# Patient Record
Sex: FEMALE | Race: WHITE | NOT HISPANIC OR LATINO | Employment: OTHER | URBAN - METROPOLITAN AREA
[De-identification: names, ages, dates, MRNs, and addresses within clinical notes are randomized per-mention and may not be internally consistent; named-entity substitution may affect disease eponyms.]

---

## 2017-07-10 ENCOUNTER — OFFICE VISIT (OUTPATIENT)
Dept: MEDICAL GROUP | Facility: PHYSICIAN GROUP | Age: 77
End: 2017-07-10
Payer: MEDICARE

## 2017-07-10 VITALS
HEIGHT: 64 IN | HEART RATE: 91 BPM | BODY MASS INDEX: 34.31 KG/M2 | WEIGHT: 201 LBS | OXYGEN SATURATION: 95 % | DIASTOLIC BLOOD PRESSURE: 90 MMHG | SYSTOLIC BLOOD PRESSURE: 230 MMHG | TEMPERATURE: 98.1 F

## 2017-07-10 DIAGNOSIS — I10 UNCONTROLLED HYPERTENSION: ICD-10-CM

## 2017-07-10 PROCEDURE — 99214 OFFICE O/P EST MOD 30 MIN: CPT | Performed by: FAMILY MEDICINE

## 2017-07-10 RX ORDER — AMLODIPINE BESYLATE 10 MG/1
10 TABLET ORAL DAILY
Qty: 30 TAB | Refills: 3 | Status: SHIPPED | OUTPATIENT
Start: 2017-07-10 | End: 2017-07-18

## 2017-07-10 ASSESSMENT — PATIENT HEALTH QUESTIONNAIRE - PHQ9: CLINICAL INTERPRETATION OF PHQ2 SCORE: 2

## 2017-07-10 ASSESSMENT — PAIN SCALES - GENERAL: PAINLEVEL: NO PAIN

## 2017-07-10 NOTE — MR AVS SNAPSHOT
"        Katharine Gee   7/10/2017 3:20 PM   Office Visit   MRN: [id number]    Department:  [facility]   Dept Phone:  [phone]    Description:  Female : 1940   Provider:  Jignesh Boyce M.D.           Reason for Visit     Hypertension           Allergies as of 7/10/2017     No Known Allergies      You were diagnosed with     Uncontrolled hypertension   [260723]         Vital Signs     Blood Pressure Pulse Temperature Height Weight Body Mass Index    230/90 mmHg 91 36.7 °C (98.1 °F) 1.626 m (5' 4.02\") 91.173 kg (201 lb) 34.48 kg/m2    Oxygen Saturation Smoking Status                95% Never Smoker           Basic Information     Date Of Birth Sex Race Ethnicity Preferred Language    1940 Female White Non- English      Your appointments     2017  9:40 AM   Access New To You with Phil Haynes M.D.   [facility]    [address] [place] [address] [id number] [phone]              Problem List              ICD-10-CM Priority Class Noted - Resolved    HTN (hypertension) I10   Unknown - Present    Polymyalgia (CMS-HCC) M35.3   10/6/2014 - Present    Elevated sed rate R70.0   2014 - Present    Elevated C-reactive protein (CRP) R79.82   2014 - Present    Menopause Z78.0   2015 - Present      Health Maintenance        Date Due Completion Dates    IMM DTaP/Tdap/Td Vaccine (1 - Tdap) 1957 ---    PAP SMEAR 1959 ---    IMM ZOSTER VACCINE 1998 ---    IMM PNEUMOCOCCAL 65+ (ADULT) LOW/MEDIUM RISK SERIES (2 of 2 - PCV13) 2015    MAMMOGRAM 2015, 2006    COLONOSCOPY 2016 (Done)    Override on 2006: Done    IMM INFLUENZA (1) 2017 10/5/2014    BONE DENSITY 2019            Current Immunizations     Influenza Vaccine Adult HD 10/5/2014 10:28 PM    Pneumococcal polysaccharide vaccine (PPSV-23) 2014  6:23 PM      Below and/or attached are the medications your " provider expects you to take. Review all of your home medications and newly ordered medications with your provider and/or pharmacist. Follow medication instructions as directed by your provider and/or pharmacist. Please keep your medication list with you and share with your provider. Update the information when medications are discontinued, doses are changed, or new medications (including over-the-counter products) are added; and carry medication information at all times in the event of emergency situations     Allergies:  No Known Allergies          Medications  Valid as of: July 10, 2017 -  3:50 PM    Generic Name Brand Name Tablet Size Instructions for use    AmLODIPine Besylate (Tab) NORVASC 10 MG Take 10 mg by mouth every day.        AmLODIPine Besylate (Tab) NORVASC 10 MG TAKE 1 TABLET BY MOUTH EVERY DAY        AmLODIPine Besylate (Tab) NORVASC 10 MG Take 1 Tab by mouth every day.        Benazepril HCl (Tab) LOTENSIN 20 MG Take 1 Tab by mouth every day.        Docusate Calcium   Take  by mouth.        Ergocalciferol (Cap) DRISDOL 16628 UNITS Take 1 Cap by mouth every 7 days.        PredniSONE (Tab) DELTASONE 5 MG TAKE 1 TABLET BY MOUTH EVERY DAY WITH 2.5 MG        PredniSONE (Tab) DELTASONE 5 MG TAKE 1 TABLET BY MOUTH EVERY DAY WITH 2.5 MG        .                 Medicines prescribed today were sent to:     [facility] #[id number] - [place], [place] - [address] [place] [id number]    Phone: [phone] Fax: [phone]    Open 24 Hours?: No      Medication refill instructions:       If your prescription bottle indicates you have medication refills left, it is not necessary to call your provider’s office. Please contact your pharmacy and they will refill your medication.    If your prescription bottle indicates you do not have any refills left, you may request refills at any time through one of the following ways: The online [employer] system (except Urgent Care), by  calling your provider’s office, or by asking your pharmacy to contact your provider’s office with a refill request. Medication refills are processed only during regular business hours and may not be available until the next business day. Your provider may request additional information or to have a follow-up visit with you prior to refilling your medication.   *Please Note: Medication refills are assigned a new Rx number when refilled electronically. Your pharmacy may indicate that no refills were authorized even though a new prescription for the same medication is available at the pharmacy. Please request the medicine by name with the pharmacy before contacting your provider for a refill.        Your To Do List     Future Labs/Procedures Complete By Expires    CBC WITH DIFFERENTIAL  As directed 7/11/2018    COMP METABOLIC PANEL  As directed 7/11/2018    LIPID PROFILE  As directed 7/11/2018    MICROALBUMIN CREAT RATIO URINE  As directed 7/11/2018    TSH  As directed 7/11/2018    VITAMIN D,25 HYDROXY  As directed 7/11/2018      Other Notes About Your Plan     Katharine is a Medical Home patient of Dr. Sissy Pineda.           [employer] Access Code: Activation code not generated  Current [employer] Status: Active

## 2017-07-10 NOTE — PROGRESS NOTES
Subjective:   Katharine Gee is a 76 y.o. female here today for uncontrolled hypertension    History of present illness:     Patient's previous PCP was Dr. Pineda. Patient has an appointment to establish care with Dr. Haynes.  She is here today because recently a few days back her daughter checked her blood pressure at home and it was very high , systolic being above 200. Patient is currently grieving from the loss of her  about a month back who is on chronic dialysis. Patient admits that she was very busy taking care of her  the last 2-3 years and has not seen a doctor since 2015 and has also not been taking any medications. In the past she has a diagnosis of hypertension and was taking amlodipine and benazepril. Based on last office notes of Dr. Pineda, blood pressure was stable on last office visit in 2015. But she notes that she has not taking any medication for over 2 years. She also has not had any recent blood work. She states that she is currently coping with the loss of her  and her daughters have been very supportive. She does feel very anxious from time to time.  She has occasionally been getting dizzy and headaches which are generally episodic. She does not have a headache currently. She denies chest pain, shortness of breath, weakness, numbness, tingling, dysphagia, dysarthria, speech changes, , visual disturbances.    Current medicines (including changes today)  Current Outpatient Prescriptions   Medication Sig Dispense Refill   • amlodipine (NORVASC) 10 MG Tab Take 1 Tab by mouth every day. 30 Tab 3   • amlodipine (NORVASC) 10 MG Tab TAKE 1 TABLET BY MOUTH EVERY DAY 30 Tab 1   • predniSONE (DELTASONE) 5 MG TABS TAKE 1 TABLET BY MOUTH EVERY DAY WITH 2.5 MG 30 Tab 1   • predniSONE (DELTASONE) 5 MG TABS TAKE 1 TABLET BY MOUTH EVERY DAY WITH 2.5 MG 30 Tab 1   • benazepril (LOTENSIN) 20 MG TABS Take 1 Tab by mouth every day. 30 Tab 5   • vitamin D, Ergocalciferol, (DRISDOL) 65256  "UNITS CAPS capsule Take 1 Cap by mouth every 7 days. 12 Cap 1   • amlodipine (NORVASC) 10 MG TABS Take 10 mg by mouth every day.     • Docusate Calcium (STOOL SOFTENER PO) Take  by mouth.       No current facility-administered medications for this visit.     She  has a past medical history of HTN (hypertension); Arthritis; MEDICAL HOME; Polymyalgia (10/6/2014); and HTN (hypertension). She also has no past medical history of Breast cancer.    ROS   See history of present illness  No chest pain, no shortness of breath, no abdominal pain       Objective:     Blood pressure 230/90, pulse 91, temperature 36.7 °C (98.1 °F), height 1.626 m (5' 4.02\"), weight 91.173 kg (201 lb), SpO2 95 %. Body mass index is 34.48 kg/(m^2).     PHYSICAL EXAM     GEN: Alert and oriented,well appearing, no acute distress  SKIN: warm, dry to touch, no rashes or lesions in visible areas  PSYCH: mood and affect normal, judgement normal  EYES: Conjunctiva clear, lids normal,pupils equal round and reactive  ENMT: Normal external nose and ears,EACs normal appearing, TM pearly gray with normal light reflex bilaterally,nasal mucosa and turbinates normal appearing without erythema or edema, lips without lesions,good dentition,oropharynx clear  Neck : Trachea midline, no masses or swelling, no thyromegaly  LYMPHATIC : No cervical or supraclavicular lymphadenopathy  RESPIRATORY : Unlabored respiratory effort, no distress noted, clear to auscultation bilaterally, no wheeze, rhonchi, crackles  CARDIOVASCULAR: RRR, S1 S2 normal, no murmurs , gallop , no carotid bruit, no edema of the extremities, pedal pulses B/L 2+  GI: Soft, Non tender, No rebound or guarding, no hepatosplenomegaly, no masses  MUSCULOSKELETAL : Normal gait and stance, no obvious abnormalities   NEURO: No overt focal neurologic deficits,sensation intact      Assessment and Plan:   The following treatment plan was discussed    1. Severe Hypertension  New problem.rechecked BP and it was " 212/82.Patient currently asymptomatic.  Will start patient today on amlodipine 10 mg daily.Patient aware of the risks of high blood pressure including stroke, ACS and advised to immediately call 911 in case of any such signs and symptoms.labs ordered to evaluate for end organ damage (including routine labs)  - CBC WITH DIFFERENTIAL; Future  - COMP METABOLIC PANEL; Future  - LIPID PROFILE; Future  - MICROALBUMIN CREAT RATIO URINE; Future  - TSH; Future  - VITAMIN D,25 HYDROXY; Future  - amlodipine (NORVASC) 10 MG Tab; Take 1 Tab by mouth every day.  Dispense: 30 Tab; Refill: 3      Followup: Return in about 1 week (around 7/17/2017) for establish with PCP, labs, BP.         Please note that this dictation was created using voice recognition software. I have made every reasonable attempt to correct obvious errors, but I expect that there are errors of grammar and possibly content that I did not discover before finalizing the note.

## 2017-07-11 ENCOUNTER — HOSPITAL ENCOUNTER (OUTPATIENT)
Dept: LAB | Facility: MEDICAL CENTER | Age: 77
End: 2017-07-11
Attending: FAMILY MEDICINE
Payer: MEDICARE

## 2017-07-11 DIAGNOSIS — I10 UNCONTROLLED HYPERTENSION: ICD-10-CM

## 2017-07-11 LAB
25(OH)D3 SERPL-MCNC: 27 NG/ML (ref 30–100)
ALBUMIN SERPL BCP-MCNC: 3.7 G/DL (ref 3.2–4.9)
ALBUMIN/GLOB SERPL: 1.2 G/DL
ALP SERPL-CCNC: 77 U/L (ref 30–99)
ALT SERPL-CCNC: 11 U/L (ref 2–50)
ANION GAP SERPL CALC-SCNC: 6 MMOL/L (ref 0–11.9)
AST SERPL-CCNC: 14 U/L (ref 12–45)
BASOPHILS # BLD AUTO: 0.5 % (ref 0–1.8)
BASOPHILS # BLD: 0.04 K/UL (ref 0–0.12)
BILIRUB SERPL-MCNC: 0.6 MG/DL (ref 0.1–1.5)
BUN SERPL-MCNC: 14 MG/DL (ref 8–22)
CALCIUM SERPL-MCNC: 9.3 MG/DL (ref 8.5–10.5)
CHLORIDE SERPL-SCNC: 106 MMOL/L (ref 96–112)
CHOLEST SERPL-MCNC: 188 MG/DL (ref 100–199)
CO2 SERPL-SCNC: 26 MMOL/L (ref 20–33)
CREAT SERPL-MCNC: 0.72 MG/DL (ref 0.5–1.4)
CREAT UR-MCNC: 104.1 MG/DL
EOSINOPHIL # BLD AUTO: 0.12 K/UL (ref 0–0.51)
EOSINOPHIL NFR BLD: 1.6 % (ref 0–6.9)
ERYTHROCYTE [DISTWIDTH] IN BLOOD BY AUTOMATED COUNT: 46.4 FL (ref 35.9–50)
GFR SERPL CREATININE-BSD FRML MDRD: >60 ML/MIN/1.73 M 2
GLOBULIN SER CALC-MCNC: 3.1 G/DL (ref 1.9–3.5)
GLUCOSE SERPL-MCNC: 78 MG/DL (ref 65–99)
HCT VFR BLD AUTO: 42.6 % (ref 37–47)
HDLC SERPL-MCNC: 55 MG/DL
HGB BLD-MCNC: 13.7 G/DL (ref 12–16)
IMM GRANULOCYTES # BLD AUTO: 0.02 K/UL (ref 0–0.11)
IMM GRANULOCYTES NFR BLD AUTO: 0.3 % (ref 0–0.9)
LDLC SERPL CALC-MCNC: 107 MG/DL
LYMPHOCYTES # BLD AUTO: 2.26 K/UL (ref 1–4.8)
LYMPHOCYTES NFR BLD: 30.1 % (ref 22–41)
MCH RBC QN AUTO: 28.4 PG (ref 27–33)
MCHC RBC AUTO-ENTMCNC: 32.2 G/DL (ref 33.6–35)
MCV RBC AUTO: 88.4 FL (ref 81.4–97.8)
MICROALBUMIN UR-MCNC: 1.4 MG/DL
MICROALBUMIN/CREAT UR: 13 MG/G (ref 0–30)
MONOCYTES # BLD AUTO: 0.51 K/UL (ref 0–0.85)
MONOCYTES NFR BLD AUTO: 6.8 % (ref 0–13.4)
NEUTROPHILS # BLD AUTO: 4.56 K/UL (ref 2–7.15)
NEUTROPHILS NFR BLD: 60.7 % (ref 44–72)
NRBC # BLD AUTO: 0 K/UL
NRBC BLD AUTO-RTO: 0 /100 WBC
PLATELET # BLD AUTO: 250 K/UL (ref 164–446)
PMV BLD AUTO: 10 FL (ref 9–12.9)
POTASSIUM SERPL-SCNC: 3.4 MMOL/L (ref 3.6–5.5)
PROT SERPL-MCNC: 6.8 G/DL (ref 6–8.2)
RBC # BLD AUTO: 4.82 M/UL (ref 4.2–5.4)
SODIUM SERPL-SCNC: 138 MMOL/L (ref 135–145)
TRIGL SERPL-MCNC: 128 MG/DL (ref 0–149)
TSH SERPL DL<=0.005 MIU/L-ACNC: 5.09 UIU/ML (ref 0.3–3.7)
WBC # BLD AUTO: 7.5 K/UL (ref 4.8–10.8)

## 2017-07-11 PROCEDURE — 82570 ASSAY OF URINE CREATININE: CPT

## 2017-07-11 PROCEDURE — 36415 COLL VENOUS BLD VENIPUNCTURE: CPT

## 2017-07-11 PROCEDURE — 80061 LIPID PANEL: CPT

## 2017-07-11 PROCEDURE — 82043 UR ALBUMIN QUANTITATIVE: CPT

## 2017-07-11 PROCEDURE — 80053 COMPREHEN METABOLIC PANEL: CPT

## 2017-07-11 PROCEDURE — 85025 COMPLETE CBC W/AUTO DIFF WBC: CPT

## 2017-07-11 PROCEDURE — 82306 VITAMIN D 25 HYDROXY: CPT

## 2017-07-11 PROCEDURE — 84443 ASSAY THYROID STIM HORMONE: CPT

## 2017-07-18 ENCOUNTER — OFFICE VISIT (OUTPATIENT)
Dept: MEDICAL GROUP | Facility: PHYSICIAN GROUP | Age: 77
End: 2017-07-18
Payer: MEDICARE

## 2017-07-18 ENCOUNTER — HOSPITAL ENCOUNTER (OUTPATIENT)
Dept: LAB | Facility: MEDICAL CENTER | Age: 77
End: 2017-07-18
Attending: INTERNAL MEDICINE
Payer: MEDICARE

## 2017-07-18 VITALS
BODY MASS INDEX: 33.97 KG/M2 | SYSTOLIC BLOOD PRESSURE: 162 MMHG | TEMPERATURE: 97 F | RESPIRATION RATE: 14 BRPM | OXYGEN SATURATION: 98 % | DIASTOLIC BLOOD PRESSURE: 68 MMHG | HEART RATE: 80 BPM | HEIGHT: 64 IN | WEIGHT: 199 LBS

## 2017-07-18 DIAGNOSIS — M35.3 POLYMYALGIA (HCC): ICD-10-CM

## 2017-07-18 DIAGNOSIS — E55.9 VITAMIN D DEFICIENCY: ICD-10-CM

## 2017-07-18 DIAGNOSIS — R79.89 ABNORMAL TSH: ICD-10-CM

## 2017-07-18 DIAGNOSIS — E87.6 HYPOKALEMIA: ICD-10-CM

## 2017-07-18 DIAGNOSIS — I10 ESSENTIAL HYPERTENSION: ICD-10-CM

## 2017-07-18 DIAGNOSIS — Z23 NEED FOR VACCINATION: ICD-10-CM

## 2017-07-18 PROCEDURE — G0009 ADMIN PNEUMOCOCCAL VACCINE: HCPCS | Performed by: INTERNAL MEDICINE

## 2017-07-18 PROCEDURE — 99204 OFFICE O/P NEW MOD 45 MIN: CPT | Mod: 25 | Performed by: INTERNAL MEDICINE

## 2017-07-18 PROCEDURE — 90670 PCV13 VACCINE IM: CPT | Performed by: INTERNAL MEDICINE

## 2017-07-18 RX ORDER — LISINOPRIL 10 MG/1
10 TABLET ORAL DAILY
Qty: 90 TAB | Refills: 3 | Status: SHIPPED | OUTPATIENT
Start: 2017-07-18 | End: 2017-10-04

## 2017-07-18 RX ORDER — MULTIVIT-MIN/IRON/FOLIC ACID/K 18-600-40
2000 CAPSULE ORAL DAILY
Qty: 30 CAP | COMMUNITY
Start: 2017-07-18 | End: 2019-11-01

## 2017-07-18 RX ORDER — AMLODIPINE BESYLATE 10 MG/1
10 TABLET ORAL
Qty: 90 TAB | Refills: 1 | Status: SHIPPED | OUTPATIENT
Start: 2017-07-18 | End: 2017-10-04

## 2017-07-18 NOTE — MR AVS SNAPSHOT
"        Katharine Gee   2017 9:40 AM   Office Visit   MRN: [id number]    Department:  [facility]   Dept Phone:  [phone]    Description:  Female : 1940   Provider:  Phil Haynes M.D.           Reason for Visit     Establish Care           Allergies as of 2017     No Known Allergies      You were diagnosed with     Essential hypertension   [5998087]       Polymyalgia (CMS-HCC)   [723763]       Abnormal TSH   [249977]       Hypokalemia   [158731]       Vitamin D deficiency   [2871554]       Need for vaccination   [730761]         Vital Signs     Blood Pressure Pulse Temperature Respirations Height Weight    162/68 mmHg 80 36.1 °C (97 °F) 14 1.626 m (5' 4\") 90.266 kg (199 lb)    Body Mass Index Oxygen Saturation Smoking Status             34.14 kg/m2 98% Never Smoker          Basic Information     Date Of Birth Sex Race Ethnicity Preferred Language    1940 Female White Non- English      Your appointments     Sep 05, 2017  9:20 AM   Established Patient with Phil Haynes M.D.   [facility]    [address] [place] [address] [id number] [phone]           You will be receiving a confirmation call a few days before your appointment from our automated call confirmation system.              Problem List              ICD-10-CM Priority Class Noted - Resolved    HTN (hypertension) I10   Unknown - Present    Polymyalgia (CMS-HCC) M35.3   10/6/2014 - Present    Menopause Z78.0   2015 - Present    Abnormal TSH R79.89   2017 - Present    Hypokalemia E87.6   2017 - Present    Vitamin D deficiency E55.9   2017 - Present      Health Maintenance        Date Due Completion Dates    IMM DTaP/Tdap/Td Vaccine (1 - Tdap) 1957 ---    IMM ZOSTER VACCINE 1998 ---    IMM PNEUMOCOCCAL 65+ (ADULT) LOW/MEDIUM RISK SERIES (2 of 2 - PCV13) 2015    IMM INFLUENZA (1) 2017 10/5/2014    BONE DENSITY 2019            "   Current Immunizations     13-VALENT PCV PREVNAR  Incomplete    Influenza Vaccine Adult HD 10/5/2014 10:28 PM    Pneumococcal polysaccharide vaccine (PPSV-23) 7/4/2014  6:23 PM    Tdap Vaccine  Incomplete      Below and/or attached are the medications your provider expects you to take. Review all of your home medications and newly ordered medications with your provider and/or pharmacist. Follow medication instructions as directed by your provider and/or pharmacist. Please keep your medication list with you and share with your provider. Update the information when medications are discontinued, doses are changed, or new medications (including over-the-counter products) are added; and carry medication information at all times in the event of emergency situations     Allergies:  No Known Allergies          Medications  Valid as of: July 18, 2017 - 10:17 AM    Generic Name Brand Name Tablet Size Instructions for use    AmLODIPine Besylate (Tab) NORVASC 10 MG Take 1 Tab by mouth every day.        Cholecalciferol (Cap) Vitamin D 2000 UNITS Take 1 Cap by mouth every day.        Docusate Calcium   Take  by mouth.        Lisinopril (Tab) PRINIVIL 10 MG Take 1 Tab by mouth every day.        .                 Medicines prescribed today were sent to:     [facility]Y #[id number] - [place], [place] - [address] AT [address] [address]    Phone: [phone] Fax: [phone]    Open 24 Hours?: No      Medication refill instructions:       If your prescription bottle indicates you have medication refills left, it is not necessary to call your provider’s office. Please contact your pharmacy and they will refill your medication.    If your prescription bottle indicates you do not have any refills left, you may request refills at any time through one of the following ways: The online [employer] system (except Urgent Care), by calling your provider’s office, or by asking your pharmacy to contact  your provider’s office with a refill request. Medication refills are processed only during regular business hours and may not be available until the next business day. Your provider may request additional information or to have a follow-up visit with you prior to refilling your medication.   *Please Note: Medication refills are assigned a new Rx number when refilled electronically. Your pharmacy may indicate that no refills were authorized even though a new prescription for the same medication is available at the pharmacy. Please request the medicine by name with the pharmacy before contacting your provider for a refill.        Your To Do List     Future Labs/Procedures Complete By Expires    BASIC METABOLIC PANEL  As directed 7/18/2018    TSH WITH REFLEX TO FT4  As directed 7/18/2018    VITAMIN D,25 HYDROXY  As directed 7/18/2018      Other Notes About Your Plan     Katharine is a Medical Home patient of Dr. Sissy Pineda.           [employer] Access Code: Activation code not generated  Current [employer] Status: Active

## 2017-07-18 NOTE — PROGRESS NOTES
"Chief Complaint   Patient presents with   • Establish Care         HISTORY OF THE PRESENT ILLNESS: Patient is a 76 y.o. female. This pleasant patient is here today for HTN, abnormal labs, polymyalgia rheumatica    HTN (hypertension)  Pt in on amlodipine 10 mg daily for HTN. This was started last week when she was found to have a BP of over 200 systolic. Pt reports compliance with medication. Her BP improved to 162/68 today.     Denies chest pain, shortness of breath, headache, blurry vision.       Polymyalgia  Pt was previously seeing rheumatology for this. Over the past 3 years she has been taking care of her  and not able to care for herself as well and was not on medication. Prior to this she was on prednisone. She denies any joint pains currently. She denies fevers/chills, night sweats, weakness of her joints. She does report she gets tired with walking at times.     Abnormal TSH  TSH of 5.090 seen on labs. No history of thyroid disease.     Hypokalemia  K of 3.4 seen on labs.     Vitamin D deficiency  Vitamin d of 27 seen on labs. Pt is not on supplementation.       Allergies: Review of patient's allergies indicates no known allergies.    Current Outpatient Prescriptions Ordered in [facility]   Medication Sig Dispense Refill   • Cholecalciferol (VITAMIN D) 2000 UNITS Cap Take 1 Cap by mouth every day. 30 Cap    • lisinopril (PRINIVIL) 10 MG Tab Take 1 Tab by mouth every day. 90 Tab 3   • amlodipine (NORVASC) 10 MG Tab Take 1 Tab by mouth every day. 90 Tab 1   • Docusate Calcium (STOOL SOFTENER PO) Take  by mouth.       No current Epic-ordered facility-administered medications on file.       Past Medical History   Diagnosis Date   • HTN (hypertension)    • Arthritis    • MEDICAL HOME    • Polymyalgia (CMS-[facility]) 10/6/2014   • HTN (hypertension)        Past Surgical History   Procedure Laterality Date   • Pr revise secondary varicosity       \"varicose veins operated on \"    • Exploratory laparotomy  7/3/2014     " "Performed by Danita Tolbert M.D. at [facility]   • Bowel resection  7/3/2014     Performed by Danita Tolbert M.D. at [facility] [place] [facility]   • Tonsillectomy     • Vaginal hysterectomy total     • Other         Social History   Substance Use Topics   • Smoking status: Never Smoker    • Smokeless tobacco: Never Used   • Alcohol Use: Yes      Comment: occasional       Family History   Problem Relation Age of Onset   • Cancer Father      prostaste   • Cancer Brother    • Dementia Brother        Review of Systems :    Denies chest pain, SOB  All other systems reviewed and are negative except as in HPI.      Exam: Blood pressure 162/68, pulse 80, temperature 36.1 °C (97 °F), resp. rate 14, height 1.626 m (5' 4\"), weight 90.266 kg (199 lb), SpO2 98 %.    Blood pressure 162/68, pulse 80, temperature 36.1 °C (97 °F), resp. rate 14, height 1.626 m (5' 4\"), weight 90.266 kg (199 lb), SpO2 98 %. Body mass index is 34.14 kg/(m^2).   Physical Exam:  Constitutional: Alert & oriented, no acute distress  Eye: Equal, round and reactive, conjunctiva clear, lids normal  ENMT: Lips without lesions, good dentition, oropharynx clear  Neck: Trachea midline, no masses, no thyromegaly. No cervical or supraclavicular lymphadenopathy  Respiratory: Unlabored respiratory effort, lungs clear to auscultation, no wheezes, no ronchi  Cardiovascular: Normal S1, S2, no murmur, no edema  Abdomen: Obese  Skin: Warm, dry, good turgor, no rashes in visible areas  Neuro: No overt focal neurologic deficits, normal gait  Psych: Normal mood and affect, judgement normal  Musculoskeletal: Normal gait. No extremity cyanosis, clubbing, or edema.    Assessment/Plan  1. Essential hypertension  Blood pressure significantly improved from last week on amlodipine but still suboptimal today. We'll add lisinopril to patient's regimen of amlodipine. She is going to get repeat blood work done in 6 weeks for abnormal lab findings. The patient's follow up in " clinic one week after these repeat labs and assess response to blood pressure medication change at that time  - lisinopril (PRINIVIL) 10 MG Tab; Take 1 Tab by mouth every day.  Dispense: 90 Tab; Refill: 3  - amlodipine (NORVASC) 10 MG Tab; Take 1 Tab by mouth every day.  Dispense: 90 Tab; Refill: 1    2. Polymyalgia (CMS-HCC)  Patient is a longer medications or seeing rheumatology. She has no acute symptoms. We can therefore monitor this clinically and consider restarting treatment/referral if symptoms recur    3. Abnormal TSH  Seen on labs. We'll recheck lab in 6 weeks in follow-up shortly after  - TSH WITH REFLEX TO FT4; Future    4. Hypokalemia  Mildly low potassium on labs. Recheck lab in 6 weeks. Patient is also starting lisinopril which may affect this lab result which makes the repeat lab check even more necessary  - BASIC METABOLIC PANEL; Future    5. Vitamin D deficiency  Pt will start OTC vitamin D supplementation at 2000 units per day. We'll recheck lab in 6 weeks and follow-up shortly after  - VITAMIN D,25 HYDROXY; Future  - Cholecalciferol (VITAMIN D) 2000 UNITS Cap; Take 1 Cap by mouth every day.  Dispense: 30 Cap    6. Need for vaccination  - Tdap =>8yo IM  - Prevnar 13 PCV-13    Return in about 8 weeks (around 9/12/2017).    Please note that this dictation was created using voice recognition software. I have made every reasonable attempt to correct obvious errors, but I expect that there are errors of grammar and possibly content that I did not discover before finalizing the note.

## 2017-07-18 NOTE — ASSESSMENT & PLAN NOTE
Pt in on amlodipine 10 mg daily for HTN. This was started last week when she was found to have a BP of over 200 systolic. Pt reports compliance with medication. Her BP improved to 162/68 today.     Denies chest pain, shortness of breath, headache, blurry vision.

## 2017-07-18 NOTE — ASSESSMENT & PLAN NOTE
Pt was previously seeing rheumatology for this. Over the past 3 years she has been taking care of her  and not able to care for herself as well and was not on medication. Prior to this she was on prednisone. She denies any joint pains currently. She denies fevers/chills, night sweats, weakness of her joints. She does report she gets tired with walking at times.

## 2017-09-14 ENCOUNTER — HOSPITAL ENCOUNTER (OUTPATIENT)
Dept: LAB | Facility: MEDICAL CENTER | Age: 77
End: 2017-09-14
Attending: INTERNAL MEDICINE
Payer: MEDICARE

## 2017-09-14 DIAGNOSIS — E87.6 HYPOKALEMIA: ICD-10-CM

## 2017-09-14 DIAGNOSIS — E55.9 VITAMIN D DEFICIENCY: ICD-10-CM

## 2017-09-14 DIAGNOSIS — R79.89 ABNORMAL TSH: ICD-10-CM

## 2017-09-14 LAB
25(OH)D3 SERPL-MCNC: 25 NG/ML (ref 30–100)
ANION GAP SERPL CALC-SCNC: 8 MMOL/L (ref 0–11.9)
BUN SERPL-MCNC: 23 MG/DL (ref 8–22)
CALCIUM SERPL-MCNC: 10.1 MG/DL (ref 8.5–10.5)
CHLORIDE SERPL-SCNC: 110 MMOL/L (ref 96–112)
CO2 SERPL-SCNC: 24 MMOL/L (ref 20–33)
CREAT SERPL-MCNC: 0.91 MG/DL (ref 0.5–1.4)
GFR SERPL CREATININE-BSD FRML MDRD: 60 ML/MIN/1.73 M 2
GLUCOSE SERPL-MCNC: 98 MG/DL (ref 65–99)
POTASSIUM SERPL-SCNC: 4.1 MMOL/L (ref 3.6–5.5)
SODIUM SERPL-SCNC: 142 MMOL/L (ref 135–145)
T4 FREE SERPL-MCNC: 0.73 NG/DL (ref 0.53–1.43)
TSH SERPL DL<=0.005 MIU/L-ACNC: 3.93 UIU/ML (ref 0.3–3.7)

## 2017-09-14 PROCEDURE — 82306 VITAMIN D 25 HYDROXY: CPT

## 2017-09-14 PROCEDURE — 80048 BASIC METABOLIC PNL TOTAL CA: CPT

## 2017-09-14 PROCEDURE — 84439 ASSAY OF FREE THYROXINE: CPT

## 2017-09-14 PROCEDURE — 84443 ASSAY THYROID STIM HORMONE: CPT

## 2017-09-14 PROCEDURE — 36415 COLL VENOUS BLD VENIPUNCTURE: CPT

## 2017-10-04 ENCOUNTER — OFFICE VISIT (OUTPATIENT)
Dept: MEDICAL GROUP | Facility: PHYSICIAN GROUP | Age: 77
End: 2017-10-04
Payer: MEDICARE

## 2017-10-04 VITALS
TEMPERATURE: 97 F | RESPIRATION RATE: 12 BRPM | WEIGHT: 198 LBS | BODY MASS INDEX: 33.8 KG/M2 | HEART RATE: 70 BPM | DIASTOLIC BLOOD PRESSURE: 64 MMHG | HEIGHT: 64 IN | OXYGEN SATURATION: 97 % | SYSTOLIC BLOOD PRESSURE: 128 MMHG

## 2017-10-04 DIAGNOSIS — R42 DIZZINESS: ICD-10-CM

## 2017-10-04 DIAGNOSIS — R41.3 MEMORY LOSS: ICD-10-CM

## 2017-10-04 DIAGNOSIS — E03.9 ACQUIRED HYPOTHYROIDISM: ICD-10-CM

## 2017-10-04 DIAGNOSIS — I10 ESSENTIAL HYPERTENSION: ICD-10-CM

## 2017-10-04 PROCEDURE — 99214 OFFICE O/P EST MOD 30 MIN: CPT | Performed by: INTERNAL MEDICINE

## 2017-10-04 RX ORDER — LISINOPRIL 20 MG/1
20 TABLET ORAL DAILY
Qty: 30 TAB | Refills: 3 | Status: SHIPPED | OUTPATIENT
Start: 2017-10-04 | End: 2018-01-31 | Stop reason: SDUPTHER

## 2017-10-04 RX ORDER — LEVOTHYROXINE SODIUM 0.03 MG/1
25 TABLET ORAL
Qty: 30 TAB | Refills: 2 | Status: SHIPPED | OUTPATIENT
Start: 2017-10-04 | End: 2017-12-30 | Stop reason: SDUPTHER

## 2017-10-04 RX ORDER — AMLODIPINE BESYLATE 5 MG/1
5 TABLET ORAL DAILY
Qty: 30 TAB | Refills: 2 | Status: SHIPPED | OUTPATIENT
Start: 2017-10-04 | End: 2017-12-30 | Stop reason: SDUPTHER

## 2017-10-04 NOTE — ASSESSMENT & PLAN NOTE
Pt reports she has difficulty with memory. She will often repeat the same thing to her daughter multiple times or think she hasn't done something that she has already done. She does have a lot of grief recently with the loss of her  and son in the last 3 months.

## 2017-10-04 NOTE — ASSESSMENT & PLAN NOTE
Pt has been found to have low thyroid function on labs. She is not on medication. She reports symptoms of low energy, cold intolerance.     She reports difficulty with memory loss as well.

## 2017-10-04 NOTE — ASSESSMENT & PLAN NOTE
Pt in on lisinorpil 10 mg daily and amlodipine 10 mg daily for HTN. Pt reports compliance with medication. BP is at goal per JNC 8 critieria today.     Denies chest pain, shortness of breath, headache. She reports edema in her ankles. She does report a sensation of dizziness and instability at times. She describes it as a sensation of vertigo. This primarily occurs when she is standing up from a sitting position.     Denies dysphagia, numbness/weakness of extremities, vision or speech abnormalities.

## 2017-10-04 NOTE — PROGRESS NOTES
Subjective:   Katharine Gee is a 76 y.o. female here today for hypothyroidism, memory loss, HTN    Acquired hypothyroidism  Pt has been found to have low thyroid function on labs. She is not on medication. She reports symptoms of low energy, cold intolerance.     She reports difficulty with memory loss as well.     Memory loss  Pt reports she has difficulty with memory. She will often repeat the same thing to her daughter multiple times or think she hasn't done something that she has already done. She does have a lot of grief recently with the loss of her  and son in the last 3 months.     HTN (hypertension)  Pt in on lisinorpil 10 mg daily and amlodipine 10 mg daily for HTN. Pt reports compliance with medication. BP is at goal per JNC 8 critieria today.     Denies chest pain, shortness of breath, headache. She reports edema in her ankles. She does report a sensation of dizziness and instability at times. She describes it as a sensation of vertigo. This primarily occurs when she is standing up from a sitting position.     Denies dysphagia, numbness/weakness of extremities, vision or speech abnormalities.        Current medicines (including changes today)  Current Outpatient Prescriptions   Medication Sig Dispense Refill   • aspirin 81 MG tablet Take 81 mg by mouth every day.     • levothyroxine (SYNTHROID) 25 MCG Tab Take 1 Tab by mouth Every morning on an empty stomach. 30 Tab 2   • amlodipine (NORVASC) 5 MG Tab Take 1 Tab by mouth every day. 30 Tab 2   • lisinopril (PRINIVIL) 20 MG Tab Take 1 Tab by mouth every day. 30 Tab 3   • Cholecalciferol (VITAMIN D) 2000 UNITS Cap Take 1 Cap by mouth every day. 30 Cap    • Docusate Calcium (STOOL SOFTENER PO) Take  by mouth.       No current facility-administered medications for this visit.      She  has a past medical history of Arthritis; HTN (hypertension); HTN (hypertension); MEDICAL HOME; and Polymyalgia (CMS-HCC) (10/6/2014). She also has no past  "medical history of Breast cancer (CMS-HCC).    ROS   No chest pain, no SOB     Objective:     Blood pressure 128/64, pulse 70, temperature 36.1 °C (97 °F), resp. rate 12, height 1.626 m (5' 4\"), weight 89.8 kg (198 lb), SpO2 97 %. Body mass index is 33.99 kg/m².   Physical Exam:  Constitutional: Alert & oriented, no acute distress  Eye: Conjunctiva clear, lids normal, no discharge  ENMT: Lips without lesions, normal external nose and ears  Neck: Trachea midline, no masses, no thyromegaly. No cervical or supraclavicular lymphadenopathy  Respiratory: Unlabored respiratory effort, lungs clear to auscultation, no wheezes, no ronchi  Cardiovascular: Normal S1, S2, no murmur,Positive bilateral lower extremity edema  Skin: Warm, dry, good turgor, no rashes in visible areas  Neuro: Cranial nerves II through XII intact, normal strength and sensation bilateral upper and lower extremities, normal gait  Psych: Normal mood and affect      Assessment and Plan:   The following treatment plan was discussed    1. Acquired hypothyroidism  Will start Levothyroxine 25 µg daily and follow-up to assess response. We'll repeat labs in 6 weeks. We'll see if patient has response with regards to weakness, fatigue, dizziness, and memory loss.  - levothyroxine (SYNTHROID) 25 MCG Tab; Take 1 Tab by mouth Every morning on an empty stomach.  Dispense: 30 Tab; Refill: 2  - TSH WITH REFLEX TO FT4; Future    2. Memory loss  Etiology possibly related to thyroid or grief given she recently lost her  and son in the last 3 months. Patient counseled that we can monitor this at this time clinically given she is able to perform all her ADLs. If memory problems persist will consider formal evaluation    3. Essential hypertension  Given lower extremity edema will decrease amlodipine to 5 mg daily and increase lisinopril to 20 mg daily to compensate. Will have patient follow up in one week to recheck blood pressure  - amlodipine (NORVASC) 5 MG Tab; Take " 1 Tab by mouth every day.  Dispense: 30 Tab; Refill: 2  - lisinopril (PRINIVIL) 20 MG Tab; Take 1 Tab by mouth every day.  Dispense: 30 Tab; Refill: 3    4. Dizziness  Orthostatic negative. Pt does not have red flag symptoms that require urgent imaging. Gait is normal today. If neurologic symptoms develop or if dizziness persists will consider imaging. However at this time given we are making medication changes as above we will follow up and see if there is improvement with these changes      Followup: Return in about 1 week (around 10/11/2017).    Please note that this dictation was created using voice recognition software. I have made every reasonable attempt to correct obvious errors, but I expect that there are errors of grammar and possibly content that I did not discover before finalizing the note.

## 2017-10-11 ENCOUNTER — OFFICE VISIT (OUTPATIENT)
Dept: MEDICAL GROUP | Facility: PHYSICIAN GROUP | Age: 77
End: 2017-10-11
Payer: MEDICARE

## 2017-10-11 VITALS
WEIGHT: 198 LBS | DIASTOLIC BLOOD PRESSURE: 62 MMHG | HEART RATE: 74 BPM | TEMPERATURE: 97.9 F | HEIGHT: 64 IN | OXYGEN SATURATION: 97 % | SYSTOLIC BLOOD PRESSURE: 128 MMHG | BODY MASS INDEX: 33.8 KG/M2 | RESPIRATION RATE: 12 BRPM

## 2017-10-11 DIAGNOSIS — Z23 NEED FOR VACCINATION: ICD-10-CM

## 2017-10-11 DIAGNOSIS — I10 ESSENTIAL HYPERTENSION: ICD-10-CM

## 2017-10-11 DIAGNOSIS — E66.9 OBESITY (BMI 30-39.9): ICD-10-CM

## 2017-10-11 PROCEDURE — 90471 IMMUNIZATION ADMIN: CPT | Performed by: INTERNAL MEDICINE

## 2017-10-11 PROCEDURE — 99214 OFFICE O/P EST MOD 30 MIN: CPT | Mod: 25 | Performed by: INTERNAL MEDICINE

## 2017-10-11 PROCEDURE — 90715 TDAP VACCINE 7 YRS/> IM: CPT | Performed by: INTERNAL MEDICINE

## 2017-10-11 NOTE — ASSESSMENT & PLAN NOTE
Pt in on lisinopril 20 mg daily and amlodipine 5 mg daily for HTN. Medications were adjusted at last visit due to LE edema and dizziness. Her BP remains well controlled today. With regards to her edema and dizziness, patient reports that the edema is mildly improved and that the dizziness has resolved.     Denies chest pain, shortness of breath, headache, blurry vision

## 2017-10-11 NOTE — PROGRESS NOTES
"Subjective:   Katharine Gee is a 76 y.o. female here today for HTN, obesity    HTN (hypertension)  Pt in on lisinopril 20 mg daily and amlodipine 5 mg daily for HTN. Medications were adjusted at last visit due to LE edema and dizziness. Her BP remains well controlled today. With regards to her edema and dizziness, patient reports that the edema is mildly improved and that the dizziness has resolved.     Denies chest pain, shortness of breath, headache, blurry vision       Current medicines (including changes today)  Current Outpatient Prescriptions   Medication Sig Dispense Refill   • aspirin 81 MG tablet Take 81 mg by mouth every day.     • levothyroxine (SYNTHROID) 25 MCG Tab Take 1 Tab by mouth Every morning on an empty stomach. 30 Tab 2   • amlodipine (NORVASC) 5 MG Tab Take 1 Tab by mouth every day. 30 Tab 2   • lisinopril (PRINIVIL) 20 MG Tab Take 1 Tab by mouth every day. 30 Tab 3   • Cholecalciferol (VITAMIN D) 2000 UNITS Cap Take 1 Cap by mouth every day. 30 Cap    • Docusate Calcium (STOOL SOFTENER PO) Take  by mouth.       No current facility-administered medications for this visit.      She  has a past medical history of Arthritis; HTN (hypertension); HTN (hypertension); MEDICAL HOME; and Polymyalgia (CMS-HCC) (10/6/2014). She also has no past medical history of Breast cancer (CMS-HCC).    ROS   No chest pain, no shortness of breath, no headache     Objective:     Blood pressure 128/62, pulse 74, temperature 36.6 °C (97.9 °F), resp. rate 12, height 1.626 m (5' 4\"), weight 89.8 kg (198 lb), SpO2 97 %. Body mass index is 33.99 kg/m².   Physical Exam:  Constitutional: Alert & oriented, no acute distress  Eye: Conjunctiva clear, lids normal, no discharge  ENMT: Lips without lesions, normal external nose and ears  Skin: Warm, dry, good turgor, no rashes in visible areas  Neuro: No overt focal neurologic deficits, normal gait  Psych: Normal mood and affect      Assessment and Plan:   The following " treatment plan was discussed    1. Essential hypertension  Remains well controlled and now side effect profile is improved. Can continue current medication regimen. Pt will be following up in 2 months to assess response to thyroid medication. If side effect of edema becomes intolerable she will let us know at this time    2. Obesity (BMI 30-39.9)  - Patient identified as having weight management issue.  Appropriate orders and counseling given.    3. Need for vaccination  - Tdap =>6yo IM      Followup: Return in about 2 months (around 12/11/2017).    Please note that this dictation was created using voice recognition software. I have made every reasonable attempt to correct obvious errors, but I expect that there are errors of grammar and possibly content that I did not discover before finalizing the note.

## 2017-11-06 ENCOUNTER — PATIENT OUTREACH (OUTPATIENT)
Dept: HEALTH INFORMATION MANAGEMENT | Facility: OTHER | Age: 77
End: 2017-11-06

## 2017-11-06 NOTE — PROGRESS NOTES
WebIZ Checked & Epic Updated: Yes  · WebIZ Recommendations: FLU and ZOSTAVAX (Shingles)  · Is patient due for Tdap? NO  · Is patient due for Shingles? YES. Patient was not notified of copay/out of pocket cost.  HealthConnect Verified: yes  Verify PCP: yes    Communication Preference Obtained: yes     Review Care Team: yes    Annual Wellness Visit Scheduling  1. Scheduling Status:Scheduled          Care Gap Scheduling (Attempt to Schedule EACH Overdue Care Gap!)     Health Maintenance Due   Topic Date Due   • Annual Wellness Visit  1940   • IMM ZOSTER VACCINE  Wants to Discuss Immunizations with PCP first    IMM INFLUENZA (1) 09/01/2017        Scheduled patient for Annual Wellness Visit       MyChart Activation: already active

## 2017-12-11 ENCOUNTER — OFFICE VISIT (OUTPATIENT)
Dept: MEDICAL GROUP | Facility: PHYSICIAN GROUP | Age: 77
End: 2017-12-11
Payer: MEDICARE

## 2017-12-11 ENCOUNTER — HOSPITAL ENCOUNTER (OUTPATIENT)
Dept: LAB | Facility: MEDICAL CENTER | Age: 77
End: 2017-12-11
Attending: INTERNAL MEDICINE
Payer: MEDICARE

## 2017-12-11 VITALS
OXYGEN SATURATION: 97 % | TEMPERATURE: 96.8 F | RESPIRATION RATE: 12 BRPM | HEIGHT: 64 IN | DIASTOLIC BLOOD PRESSURE: 64 MMHG | HEART RATE: 70 BPM | WEIGHT: 198 LBS | BODY MASS INDEX: 33.8 KG/M2 | SYSTOLIC BLOOD PRESSURE: 124 MMHG

## 2017-12-11 DIAGNOSIS — I10 ESSENTIAL HYPERTENSION: ICD-10-CM

## 2017-12-11 DIAGNOSIS — E55.9 VITAMIN D DEFICIENCY: ICD-10-CM

## 2017-12-11 DIAGNOSIS — E03.9 ACQUIRED HYPOTHYROIDISM: ICD-10-CM

## 2017-12-11 LAB
25(OH)D3 SERPL-MCNC: 32 NG/ML (ref 30–100)
TSH SERPL DL<=0.005 MIU/L-ACNC: 3.1 UIU/ML (ref 0.3–3.7)

## 2017-12-11 PROCEDURE — 36415 COLL VENOUS BLD VENIPUNCTURE: CPT

## 2017-12-11 PROCEDURE — 84443 ASSAY THYROID STIM HORMONE: CPT

## 2017-12-11 PROCEDURE — 82306 VITAMIN D 25 HYDROXY: CPT

## 2017-12-11 PROCEDURE — 99214 OFFICE O/P EST MOD 30 MIN: CPT | Performed by: INTERNAL MEDICINE

## 2017-12-11 NOTE — ASSESSMENT & PLAN NOTE
Pt was started on levothyroxine 25 mcg daily 2 months ago due to abnormal labs. She has chronic cold intolerance but states there was no improvement with the medication. She denies issues with energy and reports good energy. She reports some difficulty with memory loss and this is unchanged since starting medication.

## 2017-12-11 NOTE — PROGRESS NOTES
"Subjective:   Katharine Gee is a 77 y.o. female here today for hypothyroidism, HTN    Acquired hypothyroidism  Pt was started on levothyroxine 25 mcg daily 2 months ago due to abnormal labs. She has chronic cold intolerance but states there was no improvement with the medication. She denies issues with energy and reports good energy. She reports some difficulty with memory loss and this is unchanged since starting medication.     HTN (hypertension)  Pt in on lisinopril 20 mg daily and amlodipine 5 mg daily for HTN. Pt reports compliance with medication. BP is at goal level today.   Denies chest pain, shortness of breath. She has known cataracts and is following with ophthalmology for this.       Vitamin D deficiency  Seen on labs previously. Pt was started on supplementation for this about 2 months ago       Current medicines (including changes today)  Current Outpatient Prescriptions   Medication Sig Dispense Refill   • aspirin 81 MG tablet Take 81 mg by mouth every day.     • levothyroxine (SYNTHROID) 25 MCG Tab Take 1 Tab by mouth Every morning on an empty stomach. 30 Tab 2   • amlodipine (NORVASC) 5 MG Tab Take 1 Tab by mouth every day. 30 Tab 2   • lisinopril (PRINIVIL) 20 MG Tab Take 1 Tab by mouth every day. 30 Tab 3   • Cholecalciferol (VITAMIN D) 2000 UNITS Cap Take 1 Cap by mouth every day. 30 Cap    • Docusate Calcium (STOOL SOFTENER PO) Take  by mouth.       No current facility-administered medications for this visit.      She  has a past medical history of Arthritis; HTN (hypertension); HTN (hypertension); MEDICAL HOME; and Polymyalgia (CMS-[facility]) (10/6/2014). She also has no past medical history of Breast cancer (CMS-HCC).    ROS   No chest pain, no shortness of breath     Objective:     Blood pressure 124/64, pulse 70, temperature 36 °C (96.8 °F), resp. rate 12, height 1.626 m (5' 4\"), weight 89.8 kg (198 lb), SpO2 97 %. Body mass index is 33.99 kg/m².   Physical Exam:  Constitutional: " Alert & oriented, no acute distress  Eye: Conjunctiva clear, lids normal, no discharge  ENMT: Lips without lesions, normal external nose and ears  Respiratory: Unlabored respiratory effort, lungs clear to auscultation, no wheezes, no ronchi  Cardiovascular: Normal S1, S2, no murmur  Abdomen: Obese  Skin: Warm, dry, good turgor, no rashes in visible areas  Neuro: No overt focal neurologic deficits, normal gait  Psych: Normal mood and affect      Assessment and Plan:   The following treatment plan was discussed    1. Acquired hypothyroidism  Continue levothyroxine. Will get labs to assess level of control  - TSH WITH REFLEX TO FT4; Future    2. Essential hypertension  Well controlled, continue current medications    3. Vitamin D deficiency  Continue supplementation and will check lab  - VITAMIN D,25 HYDROXY; Future      Followup: No Follow-up on file.    Please note that this dictation was created using voice recognition software. I have made every reasonable attempt to correct obvious errors, but I expect that there are errors of grammar and possibly content that I did not discover before finalizing the note.

## 2017-12-11 NOTE — ASSESSMENT & PLAN NOTE
Pt in on lisinopril 20 mg daily and amlodipine 5 mg daily for HTN. Pt reports compliance with medication. BP is at goal level today.   Denies chest pain, shortness of breath. She has known cataracts and is following with ophthalmology for this.

## 2017-12-30 DIAGNOSIS — E03.9 ACQUIRED HYPOTHYROIDISM: ICD-10-CM

## 2017-12-30 DIAGNOSIS — I10 ESSENTIAL HYPERTENSION: ICD-10-CM

## 2018-01-02 RX ORDER — LEVOTHYROXINE SODIUM 0.03 MG/1
25 TABLET ORAL
Qty: 30 TAB | Refills: 0 | Status: SHIPPED | OUTPATIENT
Start: 2018-01-02 | End: 2018-01-31 | Stop reason: SDUPTHER

## 2018-01-02 RX ORDER — AMLODIPINE BESYLATE 5 MG/1
5 TABLET ORAL DAILY
Qty: 30 TAB | Refills: 0 | Status: SHIPPED | OUTPATIENT
Start: 2018-01-02 | End: 2018-01-31 | Stop reason: SDUPTHER

## 2018-01-31 DIAGNOSIS — E03.9 ACQUIRED HYPOTHYROIDISM: ICD-10-CM

## 2018-01-31 DIAGNOSIS — I10 ESSENTIAL HYPERTENSION: ICD-10-CM

## 2018-01-31 RX ORDER — LEVOTHYROXINE SODIUM 0.03 MG/1
25 TABLET ORAL
Qty: 30 TAB | Refills: 0 | Status: SHIPPED | OUTPATIENT
Start: 2018-01-31 | End: 2018-02-28 | Stop reason: SDUPTHER

## 2018-01-31 RX ORDER — LISINOPRIL 20 MG/1
20 TABLET ORAL DAILY
Qty: 30 TAB | Refills: 0 | Status: SHIPPED | OUTPATIENT
Start: 2018-01-31 | End: 2018-02-28 | Stop reason: SDUPTHER

## 2018-01-31 RX ORDER — AMLODIPINE BESYLATE 5 MG/1
5 TABLET ORAL DAILY
Qty: 30 TAB | Refills: 0 | Status: SHIPPED | OUTPATIENT
Start: 2018-01-31 | End: 2018-02-28 | Stop reason: SDUPTHER

## 2018-01-31 NOTE — TELEPHONE ENCOUNTER
Received refill request for amlodipine and levothyroxine.  Pt was seen in clinic recently and is taking this medication for HTN and hypothyroidism. Refills sent to pharmacy    Phil Haynes M.D.

## 2018-01-31 NOTE — TELEPHONE ENCOUNTER
Received refill request for lisinopril.  Pt was seen in clinic recently and is taking this medication for HTN. Refill sent to pharmacy    Phil Haynes M.D.

## 2018-02-01 ENCOUNTER — OFFICE VISIT (OUTPATIENT)
Dept: MEDICAL GROUP | Facility: PHYSICIAN GROUP | Age: 78
End: 2018-02-01
Payer: MEDICARE

## 2018-02-01 VITALS
OXYGEN SATURATION: 98 % | HEIGHT: 64 IN | TEMPERATURE: 98.2 F | DIASTOLIC BLOOD PRESSURE: 80 MMHG | RESPIRATION RATE: 12 BRPM | BODY MASS INDEX: 34.15 KG/M2 | HEART RATE: 67 BPM | WEIGHT: 200 LBS | SYSTOLIC BLOOD PRESSURE: 120 MMHG

## 2018-02-01 DIAGNOSIS — M35.3 POLYMYALGIA (HCC): ICD-10-CM

## 2018-02-01 DIAGNOSIS — E03.9 ACQUIRED HYPOTHYROIDISM: ICD-10-CM

## 2018-02-01 DIAGNOSIS — I10 ESSENTIAL HYPERTENSION: ICD-10-CM

## 2018-02-01 PROCEDURE — 99213 OFFICE O/P EST LOW 20 MIN: CPT | Performed by: INTERNAL MEDICINE

## 2018-02-01 RX ORDER — AMLODIPINE BESYLATE 5 MG/1
5 TABLET ORAL DAILY
Qty: 90 TAB | Refills: 3 | Status: CANCELLED | OUTPATIENT
Start: 2018-02-01

## 2018-02-01 RX ORDER — LEVOTHYROXINE SODIUM 0.03 MG/1
25 TABLET ORAL
Qty: 90 TAB | Refills: 3 | Status: CANCELLED | OUTPATIENT
Start: 2018-02-01

## 2018-02-01 RX ORDER — LISINOPRIL 20 MG/1
20 TABLET ORAL DAILY
Qty: 90 TAB | Refills: 3 | Status: CANCELLED | OUTPATIENT
Start: 2018-02-01

## 2018-02-01 NOTE — ASSESSMENT & PLAN NOTE
Per chart review with prior PCP she was seeing rheumatologist but patient cannot recall this diagnosis or ever seeing a rheumatologist.

## 2018-02-01 NOTE — PROGRESS NOTES
"PRIMARY CARE CLINIC NEW PATIENT H&P  Chief Complaint   Patient presents with   • Medication Refill     History of Present Illness     Polymyalgia  Per chart review with prior PCP she was seeing rheumatologist but patient cannot recall this diagnosis or ever seeing a rheumatologist.     HTN (hypertension)  On lisinopril and amlodipine starting a few months ago. She had put her own medical issues aside since she was caring for her very sick  but starting following up after her  passed away.     Current Outpatient Prescriptions   Medication Sig Dispense Refill   • lisinopril (PRINIVIL) 20 MG Tab TAKE 1 TAB BY MOUTH EVERY DAY. 30 Tab 0   • amlodipine (NORVASC) 5 MG Tab TAKE 1 TAB BY MOUTH EVERY DAY. 30 Tab 0   • levothyroxine (SYNTHROID) 25 MCG Tab TAKE 1 TAB BY MOUTH EVERY MORNING ON AN EMPTY STOMACH. 30 Tab 0   • aspirin 81 MG tablet Take 81 mg by mouth every day.     • Cholecalciferol (VITAMIN D) 2000 UNITS Cap Take 1 Cap by mouth every day. 30 Cap    • Docusate Calcium (STOOL SOFTENER PO) Take  by mouth.       No current facility-administered medications for this visit.        Past Medical History:   Diagnosis Date   • Acquired hypothyroidism 7/18/2017   • Arthritis    • HTN (hypertension)    • Memory loss 10/4/2017   • Menopause 2/2/2015   • Polymyalgia (CMS-HCC) 10/6/2014     Past Surgical History:   Procedure Laterality Date   • EXPLORATORY LAPAROTOMY  7/3/2014    Performed by Danita Tolbert M.D. at SURGERY [facility]   • BOWEL RESECTION  7/3/2014    Performed by Danita Tolbert M.D. 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    pt doesn't know the cause   • Father    • Brother    • Brother Alive     Allergies: Patient has no known allergies.    ROS  Constitutional: Negative for fatigue/generalized weakness.   HEENT: Negative for  vision changes, hearing changes    Respiratory: Negative for shortness of breath  Cardiovascular: Negative for chest pain, palpitations  Gastrointestinal: Negative for blood in stool, constipation, diarrhea  Genitourinary: Negative for dysuria, polyuria  Musculoskeletal: Negative for myalgias, back pain, and joint pain.   Skin: Negative for rash  Neurological: Negative for numbness, tingling  Psychiatric/Behavioral: Negative for depression, anxiety     Objective   Blood pressure 120/80, pulse 67, temperature 36.8 °C (98.2 °F), resp. rate 12, height 1.626 m (5' 4\"), weight 90.7 kg (200 lb), SpO2 98 %. Body mass index is 34.33 kg/m².    General: Alert, oriented. In no acute distress   HEET: EOMI, PERRL, conjunctiva non-injected, sclera non-icteric.  Nares patent with no significant congestion or drainage.  Eileen pinnae, external auditory canals, TM pearly gray with normal light reflex bilaterally.Oral mucous membranes pink and moist with no lesions.  Neck: supple with no cervical, subclavicular lymphadenopathy, JVD, palpable thyroid nodules   Lungs: clear to auscultation bilaterally with good excursion.  CV: regular rate and rhythm.  Abdomen soft, non-distended, non-tender with normal bowel sounds. No hepatosplenomegaly, no masses palpated  Skin: no lesions. Warm, dry   Psychiatric: appropriate mood and affect       Assessment and Plan   The following treatment plan was discussed     1. Essential hypertension  Well controlled, blood pressure 120/80 today. Continue amlodipine and lisinopril. "     2. Acquired hypothyroidism  TSH at goal as of 12/11/17, continue levothyroxine 25 mcg.   - levothyroxine (SYNTHROID) 25 MCG Tab; Take 1 Tab by mouth Every morning on an empty stomach.  Dispense: 90 Tab; Refill: 3    3. Polymyalgia (CMS-HCC)  Patient does not recall this diagnosis or ever seeing a rheumatologist however carried it per chart review from her prior PCP notes. Currently asymptomatic.       Return in about 8 months (around 10/1/2018).    Health Maintenance      Health Maintenance Due   Topic Date Due   • Annual Wellness Visit  1940   • IMM ZOSTER VACCINE  12/08/1998     Will offer Shingrix once stocked, discussed shingles vaccination with patient and she is amenable     Pollo Jimenez MD  Internal Medicine  [facility]

## 2018-02-01 NOTE — ASSESSMENT & PLAN NOTE
On lisinopril and amlodipine starting a few months ago. She had put her own medical issues aside since she was caring for her very sick  but starting following up after her  passed away.

## 2018-02-28 DIAGNOSIS — I10 ESSENTIAL HYPERTENSION: ICD-10-CM

## 2018-02-28 DIAGNOSIS — E03.9 ACQUIRED HYPOTHYROIDISM: ICD-10-CM

## 2018-02-28 RX ORDER — LISINOPRIL 20 MG/1
20 TABLET ORAL DAILY
Qty: 90 TAB | Refills: 1 | Status: SHIPPED | OUTPATIENT
Start: 2018-02-28 | End: 2018-08-31 | Stop reason: SDUPTHER

## 2018-02-28 RX ORDER — LEVOTHYROXINE SODIUM 0.03 MG/1
25 TABLET ORAL
Qty: 90 TAB | Refills: 1 | Status: SHIPPED | OUTPATIENT
Start: 2018-02-28 | End: 2018-08-31 | Stop reason: SDUPTHER

## 2018-02-28 RX ORDER — AMLODIPINE BESYLATE 5 MG/1
5 TABLET ORAL DAILY
Qty: 90 TAB | Refills: 1 | Status: SHIPPED | OUTPATIENT
Start: 2018-02-28 | End: 2018-08-31 | Stop reason: SDUPTHER

## 2018-02-28 NOTE — TELEPHONE ENCOUNTER
Was the patient seen in the last year in this department? Yes   Does patient have an active prescription for medications requested? No   Received Request Via: Patient

## 2018-03-01 NOTE — TELEPHONE ENCOUNTER
Pt met protocol?: Yes pt last ov 2/2018   BP Readings from Last 1 Encounters:   02/01/18 120/80     TSH   Date Value Ref Range Status   12/11/2017 3.100 0.300 - 3.700 uIU/mL Final

## 2018-03-01 NOTE — TELEPHONE ENCOUNTER
Refill X 6 months, sent to pharmacy.Pt. Seen in the last 6 months per protocol. Last TSH was 3.1 from 12/17.  Lab Results   Component Value Date/Time    SODIUM 142 09/14/2017 03:35 PM    POTASSIUM 4.1 09/14/2017 03:35 PM    CHLORIDE 110 09/14/2017 03:35 PM    CO2 24 09/14/2017 03:35 PM    GLUCOSE 98 09/14/2017 03:35 PM    BUN 23 (H) 09/14/2017 03:35 PM    CREATININE 0.91 09/14/2017 03:35 PM

## 2018-03-02 ENCOUNTER — PATIENT OUTREACH (OUTPATIENT)
Dept: HEALTH INFORMATION MANAGEMENT | Facility: OTHER | Age: 78
End: 2018-03-02

## 2018-03-02 NOTE — PROGRESS NOTES
1. Attempt #: 1    2. HealthConnect Verified: yes    3. Verify PCP: yes    4. Care Team Updated:       •   DME Company (gait device, O2, CPAP, etc.): YES       •   Other Specialists (eye doctor, derm, GYN, cardiology, endo, etc): YES    5.  Reviewed/Updated the following with patient:       •   Communication Preference Obtained? NO       •   Preferred Pharmacy? YES       •   Preferred Lab? YES       •   Family History (document living status of immediate family members and if + hx of cancer, diabetes, hypertension, hyperlipidemia, heart attack, stroke) YES. Was Abstract Encounter opened and chart updated? YES    6. [employer] Activation: already active    7. [employer] Linda: no    8. Annual Wellness Visit Scheduling  Scheduling Status:Scheduled      9. Care Gap Scheduling (Attempt to Schedule EACH Overdue Care Gap!)     Health Maintenance Due   Topic Date Due   • Annual Wellness Visit  1940   • IMM ZOSTER VACCINE  12/08/1998 DISCUSS WITH PCP        Scheduled patient for Annual Wellness Visit    10. Patient was advised: “This is a free wellness visit. The provider will screen for medical conditions to help you stay healthy. If you have other concerns to address you may be asked to discuss these at a separate visit or there may be an additional fee.”     11. Patient was informed to arrive 15 min prior to their scheduled appointment and bring in their medication bottles.

## 2018-03-27 ENCOUNTER — TELEPHONE (OUTPATIENT)
Dept: MEDICAL GROUP | Facility: PHYSICIAN GROUP | Age: 78
End: 2018-03-27

## 2018-03-27 NOTE — TELEPHONE ENCOUNTER
ANNUAL WELLNESS VISIT PRE-VISIT PLANNING WITH OUTREACH    1.  Immunizations were updated in Epic using WebIZ?:Yes       •  WebIZ Recommendations: FLU and ZOSTAVAX (Shingles)       •  Is patient due for Tdap? NO       •  Is patient due for Shingles?YES. Patient was notified of copay/out of pocket cost.    2.  MDX printed for Provider? YES

## 2018-04-05 ENCOUNTER — OFFICE VISIT (OUTPATIENT)
Dept: MEDICAL GROUP | Facility: PHYSICIAN GROUP | Age: 78
End: 2018-04-05
Payer: MEDICARE

## 2018-04-05 VITALS
HEIGHT: 64 IN | OXYGEN SATURATION: 96 % | HEART RATE: 68 BPM | TEMPERATURE: 97.3 F | DIASTOLIC BLOOD PRESSURE: 52 MMHG | SYSTOLIC BLOOD PRESSURE: 128 MMHG | BODY MASS INDEX: 35.34 KG/M2 | WEIGHT: 207 LBS

## 2018-04-05 DIAGNOSIS — E66.9 OBESITY (BMI 30-39.9): ICD-10-CM

## 2018-04-05 DIAGNOSIS — E55.9 VITAMIN D DEFICIENCY: ICD-10-CM

## 2018-04-05 DIAGNOSIS — M35.3 POLYMYALGIA (HCC): ICD-10-CM

## 2018-04-05 DIAGNOSIS — I10 ESSENTIAL HYPERTENSION: ICD-10-CM

## 2018-04-05 DIAGNOSIS — E03.9 ACQUIRED HYPOTHYROIDISM: ICD-10-CM

## 2018-04-05 DIAGNOSIS — R41.3 MEMORY LOSS: ICD-10-CM

## 2018-04-05 PROCEDURE — G0438 PPPS, INITIAL VISIT: HCPCS | Performed by: INTERNAL MEDICINE

## 2018-04-05 ASSESSMENT — ACTIVITIES OF DAILY LIVING (ADL): BATHING_REQUIRES_ASSISTANCE: 0

## 2018-04-05 ASSESSMENT — PATIENT HEALTH QUESTIONNAIRE - PHQ9: CLINICAL INTERPRETATION OF PHQ2 SCORE: 0

## 2018-04-05 NOTE — PROGRESS NOTES
Chief Complaint   Patient presents with   • Annual Wellness Visit         HPI:  Katharine is a 77 y.o. here for Medicare Annual Wellness Visit        Patient Active Problem List    Diagnosis Date Noted   • Obesity (BMI 30-39.9) 04/05/2018   • Memory loss 10/04/2017   • Acquired hypothyroidism 07/18/2017   • Vitamin D deficiency 07/18/2017   • Polymyalgia (CMS-HCC) 10/06/2014   • HTN (hypertension)        Current Outpatient Prescriptions   Medication Sig Dispense Refill   • amLODIPine (NORVASC) 5 MG Tab Take 1 Tab by mouth every day. 90 Tab 1   • lisinopril (PRINIVIL) 20 MG Tab Take 1 Tab by mouth every day. 90 Tab 1   • levothyroxine (SYNTHROID) 25 MCG Tab Take 1 Tab by mouth Every morning on an empty stomach. 90 Tab 1   • aspirin 81 MG tablet Take 81 mg by mouth every day.     • Cholecalciferol (VITAMIN D) 2000 UNITS Cap Take 1 Cap by mouth every day. 30 Cap      No current facility-administered medications for this visit.         Patient is taking medications as noted in medication list.  Current supplements as per medication list.     Allergies: Patient has no known allergies.    Current social contact/activities: Friends and family   Patient's perception of their health: good    Is patient current with immunizations? No, due for ZOSTAVAX (Shingles). Patient is interested in receiving NONE today.     Will await Shingrix once stocked in office to offer to patient     She  reports that she has never smoked. She has never used smokeless tobacco. She reports that she drinks alcohol. She reports that she does not use drugs.  Counseling given: Not Answered        DPA/Advanced directive: Patient does not have an Advanced Directive.  A packet and workshop information was given on Advanced Directives.    ROS:    Gait: Uses no assistive device   Ostomy: no   Other tubes: no   Amputations: no   Chronic oxygen use no   Last eye exam 3 months ago   Wears hearing aids: no   : Denies any urinary leakage during the last 6  months        Depression Screening    Little interest or pleasure in doing things?  0 - not at all  Feeling down, depressed, or hopeless? 0 - not at all  Patient Health Questionnaire Score: 0    If depressive symptoms identified deferred to follow up visit unless specifically addressed in assessment and plan.    Interpretation of PHQ-9 Total Score   Score Severity   1-4 No Depression   5-9 Mild Depression   10-14 Moderate Depression   15-19 Moderately Severe Depression   20-27 Severe Depression    Screening for Cognitive Impairment    Three Minute Recall (apple, watch, chanell)  0/3    Draw clock face with all 12 numbers set to the hand to show 10 minutes past 11 o'clock  1 5/5  If cognitive concerns identified, deferred for follow up unless specifically addressed in assessment and plan.    Fall Risk Assessment    Has the patient had two or more falls in the last year or any fall with injury in the last year?  No  If fall risk identified, deferred for follow up unless specifically addressed in assessment and plan.    Safety Assessment    Throw rugs on floor.  Yes  Handrails on all stairs.  Yes  Good lighting in all hallways.  Yes  Difficulty hearing.  No  Patient counseled about all safety risks that were identified.    Functional Assessment ADLs    Are there any barriers preventing you from cooking for yourself or meeting nutritional needs?  No.    Are there any barriers preventing you from driving safely or obtaining transportation?  No.    Are there any barriers preventing you from using a telephone or calling for help?  No.    Are there any barriers preventing you from shopping?  No.    Are there any barriers preventing you from taking care of your own finances?  No.    Are there any barriers preventing you from managing your medications?  No.    Are there any barriers preventing you from showering, bathing or dressing yourself?  No.    Are you currently engaging any exercise or physical activity?  Yes.   "Housework, walks sometimes, and yardwork    Health Maintenance Summary                Annual Wellness Visit Overdue 1940     IMM ZOSTER VACCINE Overdue 12/8/1998     IMM INFLUENZA Postponed 2/1/2019 Originally 9/1/2018. Patient Refused     Done 10/5/2014 Imm Admin: Influenza Vaccine Adult HD    BONE DENSITY Next Due 12/18/2019      Done 12/18/2014 DS-BONE DENSITY STUDY (DEXA)    IMM DTaP/Tdap/Td Vaccine Next Due 10/11/2027      Done 10/11/2017 Imm Admin: Tdap Vaccine          Patient Care Team:  Pollo Jimenez M.D. as PCP - General (Internal Medicine)  Claudio Barcenas O.D. as Consulting Physician (Optometry)    Social History   Substance Use Topics   • Smoking status: Never Smoker   • Smokeless tobacco: Never Used   • Alcohol use Yes      Comment: small drink every 2-3 months      Family History   Problem Relation Age of Onset   • Heart Disease Mother    • Heart Attack Father    • Diabetes Brother    • Dementia Brother    • Diabetes Maternal Grandmother      She  has a past medical history of Acquired hypothyroidism (7/18/2017); Arthritis; HTN (hypertension); Memory loss (10/4/2017); Menopause (2/2/2015); Obesity (BMI 30-39.9) (4/5/2018); and Polymyalgia (CMS-HCC) (10/6/2014).   Past Surgical History:   Procedure Laterality Date   • EXPLORATORY LAPAROTOMY  7/3/2014    Performed by Danita Tolbert M.D. at SURGERY [facility]   • BOWEL RESECTION  7/3/2014    Performed by Danita Tolbert M.D. at SURGERY [facility]   • PB REVISE SECONDARY VARICOSITY      \"varicose veins operated on \"    • TONSILLECTOMY     • VAGINAL HYSTERECTOMY TOTAL             Exam:     Blood pressure 128/52, pulse 68, temperature 36.3 °C (97.3 °F), height 1.626 m (5' 4\"), weight 93.9 kg (207 lb), SpO2 96 %. Body mass index is 35.53 kg/m².    Hearing fair.    Dentition fair  Alert, oriented in no acute distress.  Eye contact is good, speech goal directed, affect calm      Assessment and Plan. The following treatment and monitoring plan is " recommended:    HTN (hypertension)  Controlled on lisinopril and amlodipine and just started them within the last year. Also on a baby aspirin. Denies lightheadedness and dizziness.     Acquired hypothyroidism  Per prior PCP note 12/2017 she had cold intolerance in context of abnormal labs but starting levothyroxine didn't have any impact on this. TSH was within normal limits on 12/2017.     Obesity (BMI 30-39.9)  She has been more inactive; isn't walking as much. She's now with her daughter now and her diet has changed. Prior to moving in with her daughter she ate twice a day and now eats three times a day. Patient prepares her own breakfast and lunch but her daughter cooks their dinner. Her daughter also says she enjoys desserts.     Polymyalgia  Patient has no recollection of this diagnosis but chart review from 2015 with Dr. Pineda at that time reveals that she was seen by rheumatology. A note from 2014 from APRN (was supervised by rheumatologist Dr. Irizarry, who has since passed away) in rheumatology reveals they thought she possibly had polymyalgia rheumatica. Currently patient denies joint pains.     Vitamin D deficiency  Level was 25 as of 9/2017. She is taking vitamin D3 1000 units daily.     Memory loss  She does admit difficulty with memory loss. She was told by her prior PCP that she may have been having grief reaction given that her  had just passed away near that time. She scored 0/3 on word recall today. She crochets, embroiders, is able to do her ADLs. Her  passed away June 2017 and her son passed away in September 2017.     - Will have her follow up in a month for mini mental status examination and further work up     Services suggested: No services needed at this time  Health Care Screening: Age-appropriate preventive services recommended by USPTF and ACIP covered by Medicare were discussed today. Services ordered if indicated and agreed upon by the patient.  Referrals offered:  PT/OT/Nutrition counseling/Behavioral Health/Smoking cessation as per orders if indicated.    Discussion today about general wellness and lifestyle habits:    · Prevent falls and reduce trip hazards; Cautioned about securing or removing rugs.  · Have a working fire alarm and carbon monoxide detector;   · Engage in regular physical activity and social activities       Follow-up: Return in about 4 weeks (around 5/3/2018) for cognitive assessment .     Pollo Jimenez M.D.

## 2018-04-05 NOTE — ASSESSMENT & PLAN NOTE
Patient has no recollection of this diagnosis but chart review from 2015 with Dr. Pineda at that time reveals that she was seen by rheumatology. A note from 2014 from APRN (was supervised by rheumatologist Dr. Irizarry, who has since passed away) in rheumatology reveals they thought she possibly had polymyalgia rheumatica. Currently patient denies joint pains.

## 2018-04-05 NOTE — ASSESSMENT & PLAN NOTE
She has been more inactive; isn't walking as much. She's now with her daughter now and her diet has changed. Prior to moving in with her daughter she ate twice a day and now eats three times a day. Patient prepares her own breakfast and lunch but her daughter cooks their dinner. Her daughter also says she enjoys desserts.

## 2018-04-05 NOTE — ASSESSMENT & PLAN NOTE
Per prior PCP note 12/2017 she had cold intolerance in context of abnormal labs but starting levothyroxine didn't have any impact on this. TSH was within normal limits on 12/2017.

## 2018-04-05 NOTE — ASSESSMENT & PLAN NOTE
Controlled on lisinopril and amlodipine and just started them within the last year. Also on a baby aspirin. Denies lightheadedness and dizziness.

## 2018-04-05 NOTE — ASSESSMENT & PLAN NOTE
She does admit difficulty with memory loss. She was told by her prior PCP that she may have been having grief reaction given that her  had just passed away near that time. She scored 0/3 on word recall today. She crochets, embroiders, is able to do her ADLs. Her  passed away June 2017 and her son passed away in September 2017.

## 2018-04-09 ENCOUNTER — OFFICE VISIT (OUTPATIENT)
Dept: MEDICAL GROUP | Facility: PHYSICIAN GROUP | Age: 78
End: 2018-04-09
Payer: MEDICARE

## 2018-04-09 VITALS
RESPIRATION RATE: 16 BRPM | OXYGEN SATURATION: 97 % | HEART RATE: 64 BPM | WEIGHT: 207 LBS | DIASTOLIC BLOOD PRESSURE: 84 MMHG | HEIGHT: 64 IN | BODY MASS INDEX: 35.34 KG/M2 | SYSTOLIC BLOOD PRESSURE: 134 MMHG | TEMPERATURE: 98.8 F

## 2018-04-09 DIAGNOSIS — R41.3 MEMORY LOSS: ICD-10-CM

## 2018-04-09 DIAGNOSIS — R21 RASH: ICD-10-CM

## 2018-04-09 PROBLEM — R51.9 HEADACHE: Status: ACTIVE | Noted: 2018-04-09

## 2018-04-09 PROCEDURE — 99214 OFFICE O/P EST MOD 30 MIN: CPT | Performed by: INTERNAL MEDICINE

## 2018-04-09 RX ORDER — BENZOCAINE/MENTHOL 6 MG-10 MG
LOZENGE MUCOUS MEMBRANE
Qty: 1 TUBE | Refills: 1 | Status: SHIPPED | OUTPATIENT
Start: 2018-04-09 | End: 2019-07-18

## 2018-04-09 NOTE — PATIENT INSTRUCTIONS
· Try application of heating pad for headaches. If symptoms worsen or fail to improve please let us know

## 2018-04-09 NOTE — ASSESSMENT & PLAN NOTE
Started having headaches about a month ago but had them seldom prior. The headaches usually occur behind her head and it feels like someone is crunching her head together. Denies changes in vision/hearing, numbness, tingling, weakness. The headaches last about a couple of hours and she takes ibuprofen which helps. Describes pain as squeezing. Headaches occur almost daily. Consistently drinks 3 cups of coffee daily. No changes in her diet or sleep patterns.

## 2018-04-09 NOTE — ASSESSMENT & PLAN NOTE
Has been having a rash of her right lower extremity of the lateral ankle and also on the left lower extremity. Her symptoms have been ongoing for a couple months. She has applied neosporin without relief. She's also had a couple spots on her hand which have resolved.

## 2018-04-09 NOTE — PROGRESS NOTES
PRIMARY CARE CLINIC FOLLOW UP VISIT  Chief Complaint   Patient presents with   • Headache   • Rash     on body      History of Present Illness     Rash  Has been having a rash of her right lower extremity of the lateral ankle and also on the left lower extremity. Her symptoms have been ongoing for a couple months. She has applied neosporin without relief. She's also had a couple spots on her hand which have resolved.     Memory loss  She's been having issues with memory and is here for a mini mental status exam today.     Headache  Started having headaches about a month ago but had them seldom prior. The headaches usually occur behind her head and it feels like someone is crunching her head together. Denies changes in vision/hearing, numbness, tingling, weakness. The headaches last about a couple of hours and she takes ibuprofen which helps. Describes pain as squeezing. Headaches occur almost daily. Consistently drinks 3 cups of coffee daily. No changes in her diet or sleep patterns.     Current Outpatient Prescriptions   Medication Sig Dispense Refill   • hydrocortisone 1 % Cream Apply generous amount to affected areas twice daily 1 Tube 1   • amLODIPine (NORVASC) 5 MG Tab Take 1 Tab by mouth every day. 90 Tab 1   • lisinopril (PRINIVIL) 20 MG Tab Take 1 Tab by mouth every day. 90 Tab 1   • levothyroxine (SYNTHROID) 25 MCG Tab Take 1 Tab by mouth Every morning on an empty stomach. 90 Tab 1   • aspirin 81 MG tablet Take 81 mg by mouth every day.     • Cholecalciferol (VITAMIN D) 2000 UNITS Cap Take 1 Cap by mouth every day. 30 Cap      No current facility-administered medications for this visit.      Past Medical History:   Diagnosis Date   • Acquired hypothyroidism 7/18/2017   • Arthritis    • HTN (hypertension)    • Memory loss 10/4/2017   • Menopause 2/2/2015   • Obesity (BMI 30-39.9) 4/5/2018   • Polymyalgia rheumatica (CMS-HCC)      Past Surgical History:   Procedure Laterality Date   • EXPLORATORY LAPAROTOMY   "7/3/2014    Performed by Danita Tolbert M.D. at SURGERY [facility]   • BOWEL RESECTION  7/3/2014    Performed by Danita Tolbert M.D. at SURGERY [facility]   • PB REVISE SECONDARY VARICOSITY      \"varicose veins operated on \"    • TONSILLECTOMY     • VAGINAL HYSTERECTOMY TOTAL       Social History   Substance Use Topics   • Smoking status: Never Smoker   • Smokeless tobacco: Never Used   • Alcohol use Yes      Comment: small drink every 2-3 months      Social History     Social History Narrative    Retired from [employer]      Family History   Problem Relation Age of Onset   • Heart Disease Mother    • Heart Attack Father    • Diabetes Brother    • Dementia Brother    • Diabetes Maternal Grandmother      Family Status   Relation Status   • Mother  at age 80 y.o.    pt doesn't know the cause   • Father    • Brother    • Brother    • Maternal Grandmother      Allergies: Patient has no known allergies.    ROS  As per HPI above. All other systems reviewed and negative.        Objective   Blood pressure 134/84, pulse 64, temperature 37.1 °C (98.8 °F), resp. rate 16, height 1.626 m (5' 4\"), weight 93.9 kg (207 lb), SpO2 97 %. Body mass index is 35.53 kg/m².    General: alert and oriented, pleasant, cooperative  HEENT: Normocephalic, atraumatic. No thyroid masses. EOMI  Cardiovascular: regular rate and rhythm, normal S1/S2  Pulmonary: lungs clear to auscultation bilaterally  Skin: warm and dry, no lesions or rashes  Psychiatric: appropriate mood and affect. Good insight and appropriate judgment   Neuro: facial sensation intact   Skin: erythema, roughness and dryness of right lateral ankle and spot excoriations of left lower extremity     Assessment and Plan   The following treatment plan was discussed     1. Rash  Appears eczematous, will trial hydrocortisone cream.   - hydrocortisone 1 % Cream; Apply generous amount to affected areas twice daily  Dispense: 1 Tube; Refill: " 1    2. Memory loss  Her MMSE score was 28/30 today (scanned into media tab) and most likely related to normal aging.     3. Headache  No neurological deficits. May also now be having rebound headaches from the ibuprofen she started taking at the onset of symptoms about a month ago. Continue NSAIDs as needed and may try heating pad for muscle if related to a tension headache especially given squeezing pattern of pain she is describing.       Healthcare maintenance     Health Maintenance Due   Topic Date Due   • IMM ZOSTER VACCINE  12/08/1998       Return in about 3 months (around 7/9/2018).    Pollo Jimenez MD  Internal Medicine  [facility]

## 2018-08-31 DIAGNOSIS — E03.9 ACQUIRED HYPOTHYROIDISM: ICD-10-CM

## 2018-08-31 DIAGNOSIS — I10 ESSENTIAL HYPERTENSION: ICD-10-CM

## 2018-08-31 RX ORDER — LISINOPRIL 20 MG/1
20 TABLET ORAL DAILY
Qty: 90 TAB | Refills: 0 | Status: SHIPPED | OUTPATIENT
Start: 2018-08-31 | End: 2018-12-08 | Stop reason: SDUPTHER

## 2018-08-31 RX ORDER — AMLODIPINE BESYLATE 5 MG/1
5 TABLET ORAL DAILY
Qty: 90 TAB | Refills: 0 | Status: SHIPPED | OUTPATIENT
Start: 2018-08-31 | End: 2018-12-08 | Stop reason: SDUPTHER

## 2018-08-31 RX ORDER — LEVOTHYROXINE SODIUM 0.03 MG/1
25 TABLET ORAL
Qty: 90 TAB | Refills: 0 | Status: SHIPPED | OUTPATIENT
Start: 2018-08-31 | End: 2018-12-08 | Stop reason: SDUPTHER

## 2018-09-20 ENCOUNTER — HOSPITAL ENCOUNTER (OUTPATIENT)
Dept: LAB | Facility: MEDICAL CENTER | Age: 78
End: 2018-09-20
Attending: INTERNAL MEDICINE
Payer: MEDICARE

## 2018-09-20 ENCOUNTER — OFFICE VISIT (OUTPATIENT)
Dept: MEDICAL GROUP | Facility: PHYSICIAN GROUP | Age: 78
End: 2018-09-20
Payer: MEDICARE

## 2018-09-20 VITALS
SYSTOLIC BLOOD PRESSURE: 142 MMHG | TEMPERATURE: 98.1 F | RESPIRATION RATE: 16 BRPM | DIASTOLIC BLOOD PRESSURE: 72 MMHG | WEIGHT: 207 LBS | HEIGHT: 64 IN | BODY MASS INDEX: 35.34 KG/M2 | OXYGEN SATURATION: 95 % | HEART RATE: 73 BPM

## 2018-09-20 DIAGNOSIS — N32.81 OVERACTIVE BLADDER: ICD-10-CM

## 2018-09-20 DIAGNOSIS — E66.9 OBESITY (BMI 35.0-39.9 WITHOUT COMORBIDITY): ICD-10-CM

## 2018-09-20 DIAGNOSIS — R19.00 ABDOMINAL MASS, UNSPECIFIED ABDOMINAL LOCATION: ICD-10-CM

## 2018-09-20 DIAGNOSIS — R51.9 INTRACTABLE HEADACHE, UNSPECIFIED CHRONICITY PATTERN, UNSPECIFIED HEADACHE TYPE: ICD-10-CM

## 2018-09-20 LAB
ANION GAP SERPL CALC-SCNC: 10 MMOL/L (ref 0–11.9)
BUN SERPL-MCNC: 22 MG/DL (ref 8–22)
CALCIUM SERPL-MCNC: 10.7 MG/DL (ref 8.5–10.5)
CHLORIDE SERPL-SCNC: 102 MMOL/L (ref 96–112)
CO2 SERPL-SCNC: 23 MMOL/L (ref 20–33)
CREAT SERPL-MCNC: 0.98 MG/DL (ref 0.5–1.4)
GLUCOSE SERPL-MCNC: 84 MG/DL (ref 65–99)
POTASSIUM SERPL-SCNC: 4.1 MMOL/L (ref 3.6–5.5)
SODIUM SERPL-SCNC: 135 MMOL/L (ref 135–145)

## 2018-09-20 PROCEDURE — 80048 BASIC METABOLIC PNL TOTAL CA: CPT

## 2018-09-20 PROCEDURE — 99214 OFFICE O/P EST MOD 30 MIN: CPT | Performed by: INTERNAL MEDICINE

## 2018-09-20 PROCEDURE — 36415 COLL VENOUS BLD VENIPUNCTURE: CPT

## 2018-09-20 RX ORDER — TOLTERODINE 2 MG/1
2 CAPSULE, EXTENDED RELEASE ORAL DAILY
Qty: 30 CAP | Refills: 3 | Status: SHIPPED | OUTPATIENT
Start: 2018-09-20 | End: 2019-01-17 | Stop reason: SDUPTHER

## 2018-09-20 NOTE — ASSESSMENT & PLAN NOTE
Has been noting a hard abdominal mass/lump about 6-7 months ago. Denies pain, pressure. She feels uncomfortable since she can't sit up straight with this. She has a history of bowel obstruction in 2014 and had bowel resection with Dr. Tolbert at that time. Denies change in bowel habits, nausea, vomiting, unintentional weight loss.

## 2018-09-20 NOTE — ASSESSMENT & PLAN NOTE
Gets up several times a night to urinate and also several times a day. Her daughter says Katharine doesn't drink much liquids, either.

## 2018-09-20 NOTE — ASSESSMENT & PLAN NOTE
Has almost daily headaches that require several tylenol tablets. Feels like someone is crunching her head together. Denies changes in vision, hearing, numbness, tingling, or weakness.

## 2018-09-20 NOTE — PROGRESS NOTES
PRIMARY CARE CLINIC FOLLOW UP VISIT  Chief Complaint   Patient presents with   • GI Problem     previous bowel surgery- expanding x 3 months    • Headache     on and off for several months      History of Present Illness     Abdominal mass  Has been noting a hard abdominal mass/lump about 6-7 months ago. Denies pain, pressure. She feels uncomfortable since she can't sit up straight with this. She has a history of bowel obstruction in 2014 and had bowel resection with Dr. Tolbert at that time. Denies change in bowel habits, nausea, vomiting, unintentional weight loss.     Headache  Has almost daily headaches that require several tylenol tablets. Feels like someone is crunching her head together. Denies changes in vision, hearing, numbness, tingling, or weakness.     Overactive bladder  Gets up several times a night to urinate and also several times a day. Her daughter says Katharine doesn't drink much liquids, either.     Current Outpatient Prescriptions   Medication Sig Dispense Refill   • tolterodine ER (DETROL-LA) 2 MG CAPSULE SR 24 HR Take 1 Cap by mouth every day. 30 Cap 3   • aspirin 81 MG tablet Take 81 mg by mouth every day.     • Cholecalciferol (VITAMIN D) 2000 UNITS Cap Take 1 Cap by mouth every day. 30 Cap    • amLODIPine (NORVASC) 5 MG Tab TAKE 1 TAB BY MOUTH EVERY DAY. 90 Tab 0   • lisinopril (PRINIVIL) 20 MG Tab TAKE 1 TAB BY MOUTH EVERY DAY. 90 Tab 0   • levothyroxine (SYNTHROID) 25 MCG Tab TAKE 1 TAB BY MOUTH EVERY MORNING ON AN EMPTY STOMACH. 90 Tab 0   • hydrocortisone 1 % Cream Apply generous amount to affected areas twice daily 1 Tube 1     No current facility-administered medications for this visit.      Past Medical History:   Diagnosis Date   • Acquired hypothyroidism 7/18/2017   • Arthritis    • HTN (hypertension)    • Memory loss 10/4/2017   • Menopause 2/2/2015   • Obesity (BMI 30-39.9) 4/5/2018   • Polymyalgia rheumatica (HCC)      Past Surgical History:   Procedure Laterality Date   •  "EXPLORATORY LAPAROTOMY  7/3/2014    Performed by Danita Tolbert M.D. at SURGERY [facility]   • BOWEL RESECTION  7/3/2014    Performed by Danita Tolbert M.D. at SURGERY [facility]   • PB REVISE SECONDARY VARICOSITY      \"varicose veins operated on \"    • TONSILLECTOMY     • VAGINAL HYSTERECTOMY TOTAL       Social History   Substance Use Topics   • Smoking status: Never Smoker   • Smokeless tobacco: Never Used   • Alcohol use Yes      Comment: small drink every 2-3 months      Social History     Social History Narrative    Retired from [employer] industry      Family History   Problem Relation Age of Onset   • Heart Disease Mother    • Heart Attack Father    • Diabetes Brother    • Dementia Brother    • Diabetes Maternal Grandmother      Family Status   Relation Status   • Mo  at age 80 y.o.        pt doesn't know the cause   • Fa    • Bro    • Bro    • MGMo (Not Specified)     Allergies: Patient has no known allergies.    ROS  As per HPI above. All other systems reviewed and negative.        Objective   Blood pressure 142/72, pulse 73, temperature 36.7 °C (98.1 °F), temperature source Temporal, resp. rate 16, height 1.626 m (5' 4\"), weight 93.9 kg (207 lb), SpO2 95 %. Body mass index is 35.53 kg/m².    General: alert and oriented, pleasant, cooperative  HEENT: Normocephalic, atraumatic.   Gastrointestinal: diffuse tenderness to palpation. No hepatosplenomegaly. Bowel sounds normoactive. Bloating, scattered reducible hernias  Lymphatics: no cervical or supraclavicular lymphadenopathy   Skin: warm and dry, no lesions or rashes  Psychiatric: appropriate mood and affect. Good insight and appropriate judgment     Assessment and Plan   The following treatment plan was discussed     1. Obesity (BMI 35.0-39.9 without comorbidity)  - Patient identified as having weight management issue.  Appropriate orders and counseling given.    2. Overactive bladder  Will trial detrol for her " overactive bladder, follow up next month.   - tolterodine ER (DETROL-LA) 2 MG CAPSULE SR 24 HR; Take 1 Cap by mouth every day.  Dispense: 30 Cap; Refill: 3    3. Abdominal mass, unspecified abdominal location  She does have a history of abdominal surgery in 2014 and it seems she may have hernias, will evaluate further with CT a/p. Follow up in 4 months.   - BASIC METABOLIC PANEL; Future  - CT-ABDOMEN-PELVIS WITH & W/O; Future    4. Intractable headache, unspecified chronicity pattern, unspecified headache type  She has stiffness to her left posterior neck where her headache pattern is. Would advise possible trigger point injections with physiatry to see if this helps relieve the tension and alleviates her headaches.   - REFERRAL TO PHYSIATRY (PMR)      Healthcare maintenance     Health Maintenance Due   Topic Date Due   • IMM ZOSTER VACCINES (1 of 2) 12/08/1988       Return in about 4 weeks (around 10/18/2018).    Pollo Jimenez MD  Internal Medicine  [facility]

## 2018-09-25 ENCOUNTER — TELEPHONE (OUTPATIENT)
Dept: MEDICAL GROUP | Facility: PHYSICIAN GROUP | Age: 78
End: 2018-09-25

## 2018-09-25 ENCOUNTER — HOSPITAL ENCOUNTER (OUTPATIENT)
Dept: RADIOLOGY | Facility: MEDICAL CENTER | Age: 78
End: 2018-09-25
Attending: INTERNAL MEDICINE
Payer: MEDICARE

## 2018-09-25 DIAGNOSIS — K46.9 ABDOMINAL HERNIA WITHOUT OBSTRUCTION AND WITHOUT GANGRENE, RECURRENCE NOT SPECIFIED, UNSPECIFIED HERNIA TYPE: ICD-10-CM

## 2018-09-25 DIAGNOSIS — R19.00 ABDOMINAL MASS, UNSPECIFIED ABDOMINAL LOCATION: ICD-10-CM

## 2018-09-25 PROCEDURE — 700117 HCHG RX CONTRAST REV CODE 255: Performed by: INTERNAL MEDICINE

## 2018-09-25 PROCEDURE — 74177 CT ABD & PELVIS W/CONTRAST: CPT

## 2018-09-25 RX ADMIN — IOHEXOL 100 ML: 350 INJECTION, SOLUTION INTRAVENOUS at 10:09

## 2018-09-25 RX ADMIN — IOHEXOL 50 ML: 240 INJECTION, SOLUTION INTRATHECAL; INTRAVASCULAR; INTRAVENOUS; ORAL at 10:09

## 2018-09-26 NOTE — TELEPHONE ENCOUNTER
----- Message from Pollo Jimenez M.D. sent at 9/25/2018  2:42 PM PDT -----  Please let Katharine know that her CT shows a large hernia containing bowel for which I have referred her to a surgeon (Dr. Tolbert, who has operated on her before)

## 2018-10-10 ENCOUNTER — OFFICE VISIT (OUTPATIENT)
Dept: MEDICAL GROUP | Facility: PHYSICIAN GROUP | Age: 78
End: 2018-10-10
Payer: MEDICARE

## 2018-10-10 ENCOUNTER — TELEPHONE (OUTPATIENT)
Dept: MEDICAL GROUP | Facility: PHYSICIAN GROUP | Age: 78
End: 2018-10-10

## 2018-10-10 VITALS
DIASTOLIC BLOOD PRESSURE: 80 MMHG | OXYGEN SATURATION: 96 % | TEMPERATURE: 98.2 F | RESPIRATION RATE: 16 BRPM | BODY MASS INDEX: 35.17 KG/M2 | HEART RATE: 68 BPM | SYSTOLIC BLOOD PRESSURE: 132 MMHG | HEIGHT: 64 IN | WEIGHT: 206 LBS

## 2018-10-10 DIAGNOSIS — K43.9 HERNIA OF ABDOMINAL WALL: ICD-10-CM

## 2018-10-10 DIAGNOSIS — R51.9 INTRACTABLE HEADACHE, UNSPECIFIED CHRONICITY PATTERN, UNSPECIFIED HEADACHE TYPE: ICD-10-CM

## 2018-10-10 PROCEDURE — 99212 OFFICE O/P EST SF 10 MIN: CPT | Performed by: INTERNAL MEDICINE

## 2018-10-10 NOTE — ASSESSMENT & PLAN NOTE
Katharine is here to review her most CT a/p which revealed a large abdominal hernia containing large and small bowel. Presently Katharine denies pain. Her appointment with Dr. Tolbert, who did her surgery for the hernia last 7/2014, isn't until 10/31/2018.

## 2018-10-10 NOTE — TELEPHONE ENCOUNTER
1. Caller Name: [facility]-Referral                      Call Back Number: [phone]    2. Message: Dr Jimenez is requesting Pt to be seen sooner than later for her hernia. Referral was updated to Urgent. I have left a vm with the surgical group to be able to see the Pt on an urgent basis.   - Please call anthony [facility] #[phone] with any new updates on sooner appointments.    3. Patient approves office to leave a detailed voicemail/MyChart message: N\A

## 2018-10-10 NOTE — PROGRESS NOTES
"PRIMARY CARE CLINIC FOLLOW UP VISIT  Chief Complaint   Patient presents with   • Hernia     CT      History of Present Illness     Katharine is here with her daughter to discuss the following:     Hernia of abdominal wall  Katharine is here to review her most CT a/p which revealed a large abdominal hernia containing large and small bowel. Presently Katharine denies pain. Her appointment with Dr. Tolbert, who did her surgery for the hernia last 7/2014, isn't until 10/31/2018.     Headache  Her headaches have resolved.     Current Outpatient Prescriptions   Medication Sig Dispense Refill   • tolterodine ER (DETROL-LA) 2 MG CAPSULE SR 24 HR Take 1 Cap by mouth every day. 30 Cap 3   • amLODIPine (NORVASC) 5 MG Tab TAKE 1 TAB BY MOUTH EVERY DAY. 90 Tab 0   • lisinopril (PRINIVIL) 20 MG Tab TAKE 1 TAB BY MOUTH EVERY DAY. 90 Tab 0   • levothyroxine (SYNTHROID) 25 MCG Tab TAKE 1 TAB BY MOUTH EVERY MORNING ON AN EMPTY STOMACH. 90 Tab 0   • hydrocortisone 1 % Cream Apply generous amount to affected areas twice daily 1 Tube 1   • aspirin 81 MG tablet Take 81 mg by mouth every day.     • Cholecalciferol (VITAMIN D) 2000 UNITS Cap Take 1 Cap by mouth every day. 30 Cap      No current facility-administered medications for this visit.      Past Medical History:   Diagnosis Date   • Acquired hypothyroidism 7/18/2017   • Arthritis    • HTN (hypertension)    • Memory loss 10/4/2017   • Menopause 2/2/2015   • Obesity (BMI 30-39.9) 4/5/2018   • Polymyalgia rheumatica (HCC)      Past Surgical History:   Procedure Laterality Date   • EXPLORATORY LAPAROTOMY  7/3/2014    Performed by Danita Tolbert M.D. at SURGERY [facility]   • BOWEL RESECTION  7/3/2014    Performed by Danita Tolbert M.D. 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        pt doesn't know the cause   • Fa    • Bro    • Bro    • MGMo (Not Specified)     Allergies: Patient has no known allergies.    ROS  As per HPI above. All other systems reviewed and negative.        Objective   Blood pressure 132/80, pulse 68, temperature 36.8 °C (98.2 °F), temperature source Temporal, resp. rate 16, height 1.626 m (5' 4\"), weight 93.4 kg (206 lb), SpO2 96 %. Body mass index is 35.36 kg/m².    General: alert and oriented, pleasant, cooperative  HEENT: Normocephalic, atraumatic.   Lymphatics: no cervical or supraclavicular lymphadenopathy   Skin: warm and dry, no lesions or rashes  Psychiatric: appropriate mood and affect. Good insight and appropriate judgment     Assessment and Plan   The following treatment plan was discussed     1. Hernia of abdominal wall  CT a/p reveals a hernia containing small and large bowel. Changed referral to Dr. Tolbert to urgent and contacted her office to see if they can expedite her consultation.   - REFERRAL TO GENERAL SURGERY    2. Intractable headache, unspecified chronicity pattern, unspecified headache type  Resolved.     Healthcare maintenance     There are no preventive care reminders to display for this patient.    Return in about 3 months (around 1/10/2019).    Pollo Jimenez MD  Internal Medicine  [facility]                   "

## 2018-10-26 ENCOUNTER — OFFICE VISIT (OUTPATIENT)
Dept: URGENT CARE | Facility: PHYSICIAN GROUP | Age: 78
End: 2018-10-26
Payer: MEDICARE

## 2018-10-26 VITALS
HEART RATE: 84 BPM | SYSTOLIC BLOOD PRESSURE: 134 MMHG | WEIGHT: 206 LBS | TEMPERATURE: 98.4 F | DIASTOLIC BLOOD PRESSURE: 66 MMHG | RESPIRATION RATE: 16 BRPM | OXYGEN SATURATION: 94 % | BODY MASS INDEX: 35.36 KG/M2

## 2018-10-26 DIAGNOSIS — S61.211A LACERATION OF LEFT INDEX FINGER WITHOUT FOREIGN BODY WITHOUT DAMAGE TO NAIL, INITIAL ENCOUNTER: ICD-10-CM

## 2018-10-26 PROCEDURE — 12001 RPR S/N/AX/GEN/TRNK 2.5CM/<: CPT | Performed by: FAMILY MEDICINE

## 2018-10-26 NOTE — PROGRESS NOTES
"Chief Complaint:    Chief Complaint   Patient presents with   • Finger Pain     cut index finger left hand today       History of Present Illness:    This is a new problem. Sustained laceration to left proximal index finger today - sewing equipment. Last tetanus immunization 10/11/17 per [facility].      Review of Systems:    Constitutional: Negative for fever, chills, and diaphoresis.   Eyes: Negative for change in vision, photophobia, pain, redness, and discharge.  ENT: Negative for ear pain, ear discharge, hearing loss, tinnitus, nasal congestion, nosebleeds, and sore throat.    Respiratory: Negative for shortness of breath.    Cardiovascular: Negative for chest pain.   Gastrointestinal: Negative for abdominal pain.   Genitourinary: Negative for dysuria.   Musculoskeletal: See HPI.   Skin: See HPI.   Neurological: Negative for dizziness, tingling, and focal weakness.   Heme: Does not bruise/bleed easily.   Psychiatric/Behavioral: Negative for depression, suicidal ideas, hallucinations, memory loss and substance abuse.       Past Medical History:    Past Medical History:   Diagnosis Date   • Acquired hypothyroidism 7/18/2017   • Arthritis    • HTN (hypertension)    • Memory loss 10/4/2017   • Menopause 2/2/2015   • Obesity (BMI 30-39.9) 4/5/2018   • Polymyalgia rheumatica (HCC)      Past Surgical History:    Past Surgical History:   Procedure Laterality Date   • EXPLORATORY LAPAROTOMY  7/3/2014    Performed by Danita Tolbert M.D. at SURGERY [facility]   • BOWEL RESECTION  7/3/2014    Performed by Danita Tolbert M.D. at SURGERY [facility]   • PB REVISE SECONDARY VARICOSITY      \"varicose veins operated on \"    • TONSILLECTOMY     • VAGINAL HYSTERECTOMY TOTAL       Social History:    Social History     Social History   • Marital status:      Spouse name: N/A   • Number of children: N/A   • Years of education: N/A     Occupational History   • retired Other     Social History Main Topics   • Smoking " status: Never Smoker   • Smokeless tobacco: Never Used   • Alcohol use Yes      Comment: small drink every 2-3 months    • Drug use: No   • Sexual activity: No     Other Topics Concern   • Not on file     Social History Narrative    Retired from [employer] industry      Family History:    Family History   Problem Relation Age of Onset   • Heart Disease Mother    • Heart Attack Father    • Diabetes Brother    • Dementia Brother    • Diabetes Maternal Grandmother      Medications:    Current Outpatient Prescriptions on File Prior to Visit   Medication Sig Dispense Refill   • tolterodine ER (DETROL-LA) 2 MG CAPSULE SR 24 HR Take 1 Cap by mouth every day. 30 Cap 3   • amLODIPine (NORVASC) 5 MG Tab TAKE 1 TAB BY MOUTH EVERY DAY. 90 Tab 0   • lisinopril (PRINIVIL) 20 MG Tab TAKE 1 TAB BY MOUTH EVERY DAY. 90 Tab 0   • levothyroxine (SYNTHROID) 25 MCG Tab TAKE 1 TAB BY MOUTH EVERY MORNING ON AN EMPTY STOMACH. 90 Tab 0   • hydrocortisone 1 % Cream Apply generous amount to affected areas twice daily 1 Tube 1   • aspirin 81 MG tablet Take 81 mg by mouth every day.     • Cholecalciferol (VITAMIN D) 2000 UNITS Cap Take 1 Cap by mouth every day. 30 Cap      No current facility-administered medications on file prior to visit.      Allergies:    No Known Allergies      Vitals:    Vitals:    10/26/18 1610   BP: 134/66   BP Location: Right arm   Patient Position: Sitting   Pulse: 84   Resp: 16   Temp: 36.9 °C (98.4 °F)   SpO2: 94%   Weight: 93.4 kg (206 lb)       Physical Exam:    Constitutional: Vital signs reviewed. Appears well-developed and well-nourished. No acute distress.   Eyes: Sclera white, conjunctivae clear.  ENT: External ears normal. Hearing normal.  Cardiovascular: Peripheral pulses 2+. Normal cap refill, < 2 seconds.  Pulmonary/Chest: Respirations non-labored.  Musculoskeletal: Normal gait. Normal range of motion. No muscular atrophy or weakness.  Neurological: Alert and oriented to person, place, and time. Muscle tone  normal. Coordination normal. Light touch and sensation normal.  Skin: Left index finger, proximal, radial aspect: approx 2 cm curvilinear wound that is gaping open with active bleeding.  Psychiatric: Normal mood and affect. Behavior is normal. Judgment and thought content normal.       Assessment / Plan:    1. Laceration of left index finger without foreign body without damage to nail, initial encounter      Wound cleansed with saline solution.    Agreeable to sutures.    After informed verbal consent, skin cleansed with alcohol and Betadine. Digital block with 2% Lidocaine.    Wound closed with 4-0 Ethilon, continuous running suture x 8.    Antibiotic ointment and dressing applied. Pt. will continue with daily changes.    Local wound care and signs/symptoms of infection discussed.    Follow-up 10 days for suture removal or sooner prn.

## 2018-11-05 ENCOUNTER — OFFICE VISIT (OUTPATIENT)
Dept: URGENT CARE | Facility: PHYSICIAN GROUP | Age: 78
End: 2018-11-05
Payer: MEDICARE

## 2018-11-05 VITALS
TEMPERATURE: 98 F | BODY MASS INDEX: 35.36 KG/M2 | DIASTOLIC BLOOD PRESSURE: 70 MMHG | SYSTOLIC BLOOD PRESSURE: 130 MMHG | RESPIRATION RATE: 16 BRPM | WEIGHT: 206 LBS | HEART RATE: 78 BPM | OXYGEN SATURATION: 94 %

## 2018-11-05 DIAGNOSIS — Z48.02 VISIT FOR SUTURE REMOVAL: ICD-10-CM

## 2018-11-05 PROCEDURE — 99024 POSTOP FOLLOW-UP VISIT: CPT | Performed by: FAMILY MEDICINE

## 2018-11-05 NOTE — PROGRESS NOTES
HPI:   Presents for suture removal 10 days post procedure. L 2nd finger laceration without pain, discharge or redness.     ROS:   no fever, no sensory loss, no weakness    Exam:   Gen: WDWN in no distress  Wound: well healing laceration. Running sutures removed without difficulty. No evidence of dehiscence or infection.  Neuro: sensory/motor intact     A/P   Laceration  Suture Removal  F/U prn

## 2019-01-15 ENCOUNTER — HOSPITAL ENCOUNTER (OUTPATIENT)
Dept: LAB | Facility: MEDICAL CENTER | Age: 79
End: 2019-01-15
Attending: INTERNAL MEDICINE
Payer: MEDICARE

## 2019-01-15 DIAGNOSIS — E55.9 VITAMIN D DEFICIENCY: ICD-10-CM

## 2019-01-15 DIAGNOSIS — E03.9 ACQUIRED HYPOTHYROIDISM: ICD-10-CM

## 2019-01-15 LAB
T4 FREE SERPL-MCNC: 0.88 NG/DL (ref 0.53–1.43)
TSH SERPL DL<=0.005 MIU/L-ACNC: 2.95 UIU/ML (ref 0.38–5.33)

## 2019-01-15 PROCEDURE — 82306 VITAMIN D 25 HYDROXY: CPT

## 2019-01-15 PROCEDURE — 84443 ASSAY THYROID STIM HORMONE: CPT

## 2019-01-15 PROCEDURE — 84439 ASSAY OF FREE THYROXINE: CPT

## 2019-01-15 PROCEDURE — 36415 COLL VENOUS BLD VENIPUNCTURE: CPT

## 2019-01-16 ENCOUNTER — TELEPHONE (OUTPATIENT)
Dept: MEDICAL GROUP | Facility: PHYSICIAN GROUP | Age: 79
End: 2019-01-16

## 2019-01-16 LAB — 25(OH)D3 SERPL-MCNC: 29 NG/ML (ref 30–100)

## 2019-01-16 NOTE — TELEPHONE ENCOUNTER
Future Appointments       Provider Department Center    1/17/2019 2:55 PM Pollo Jimenez M.D. [facility]        ESTABLISHED PATIENT PRE-VISIT PLANNING     Patient was NOT contacted to complete PVP.       1.  Reviewed notes from the last few office visits within the medical group: Yes    2.  If any orders were placed at last visit or intended to be done for this visit (i.e. 6 mos follow-up), do we have Results/Consult Notes?        •  Labs - Labs ordered, completed on 01/15/2019 and results are in chart.       •  Imaging - Imaging was not ordered at last office visit.       •  Referrals - No referrals were ordered at last office visit.    3. Is this appointment scheduled as a Hospital Follow-Up? No    4.  Immunizations were updated in [employer] using WebIZ?: Yes       •  Web Iz Recommendations: FLU, TD and SHINGRIX (Shingles)    5.  Patient is due for the following Health Maintenance Topics:   Health Maintenance Due   Topic Date Due   • IMM ZOSTER VACCINES (1 of 2) 12/08/1988     6. Orders for overdue Health Maintenance topics pended in Pre-Charting? NO    7.  AHA (MDX) form printed for Provider? YES    8.  Patient was NOT informed to arrive 15 min prior to their scheduled appointment and bring in their medication bottles.

## 2019-01-17 ENCOUNTER — OFFICE VISIT (OUTPATIENT)
Dept: MEDICAL GROUP | Facility: PHYSICIAN GROUP | Age: 79
End: 2019-01-17
Payer: MEDICARE

## 2019-01-17 VITALS
HEART RATE: 67 BPM | BODY MASS INDEX: 36.37 KG/M2 | RESPIRATION RATE: 16 BRPM | SYSTOLIC BLOOD PRESSURE: 128 MMHG | WEIGHT: 213 LBS | TEMPERATURE: 98.3 F | OXYGEN SATURATION: 95 % | HEIGHT: 64 IN | DIASTOLIC BLOOD PRESSURE: 78 MMHG

## 2019-01-17 DIAGNOSIS — K43.9 HERNIA OF ABDOMINAL WALL: ICD-10-CM

## 2019-01-17 DIAGNOSIS — M35.3 POLYMYALGIA (HCC): ICD-10-CM

## 2019-01-17 DIAGNOSIS — N32.81 OVERACTIVE BLADDER: ICD-10-CM

## 2019-01-17 PROCEDURE — 8041 PR SCP AHA: Performed by: INTERNAL MEDICINE

## 2019-01-17 PROCEDURE — 99213 OFFICE O/P EST LOW 20 MIN: CPT | Performed by: INTERNAL MEDICINE

## 2019-01-17 RX ORDER — TOLTERODINE 2 MG/1
2 CAPSULE, EXTENDED RELEASE ORAL DAILY
Qty: 90 CAP | Refills: 1 | Status: SHIPPED | OUTPATIENT
Start: 2019-01-17 | End: 2020-02-05

## 2019-01-17 ASSESSMENT — PATIENT HEALTH QUESTIONNAIRE - PHQ9: CLINICAL INTERPRETATION OF PHQ2 SCORE: 0

## 2019-01-17 NOTE — ASSESSMENT & PLAN NOTE
Katharine has seen Dr. Tolbert who said that it wasn't medically necessary to have this hernia repaired. Dr. Tolbert's note from 10/2018 does review that if it worsens, which Katharine now says involves her left lower quadrant, to return for follow up. Her hernia isn't painful but it is very uncomfortable.

## 2019-01-17 NOTE — PROGRESS NOTES
PRIMARY CARE CLINIC FOLLOW UP VISIT  Chief Complaint   Patient presents with   • Other     Overactive bladder-tolterodine ER (DETROL-LA)   • Hypothyroidism     levothyroxine (SYNTHROID   • Hernia     Hernia of abdominal wall     History of Present Illness     Katharine is here with her daughter for the following:     Hernia of abdominal wall  Katharine has seen Dr. Tolbert who said that it wasn't medically necessary to have this hernia repaired. Dr. Tolbert's note from 10/2018 does review that if it worsens, which Katharine now says involves her left lower quadrant, to return for follow up. Her hernia isn't painful but it is very uncomfortable.     Polymyalgia (HCC)  Question of possible polymyalgia episode in [id number] but no active symptoms.     Current Outpatient Prescriptions   Medication Sig Dispense Refill   • tolterodine ER (DETROL-LA) 2 MG CAPSULE SR 24 HR Take 1 Cap by mouth every day. 90 Cap 1   • lisinopril (PRINIVIL) 20 MG Tab TAKE 1 TABLET BY MOUTH EVERY DAY 90 Tab 0   • amLODIPine (NORVASC) 5 MG Tab TAKE 1 TABLET BY MOUTH EVERY DAY 90 Tab 0   • levothyroxine (SYNTHROID) 25 MCG Tab TAKE 1 TAB BY MOUTH EVERY MORNING ON AN EMPTY STOMACH. 90 Tab 0   • aspirin 81 MG tablet Take 81 mg by mouth every day.     • hydrocortisone 1 % Cream Apply generous amount to affected areas twice daily 1 Tube 1   • Cholecalciferol (VITAMIN D) 2000 UNITS Cap Take 1 Cap by mouth every day. 30 Cap      No current facility-administered medications for this visit.      Past Medical History:   Diagnosis Date   • Acquired hypothyroidism 7/18/2017   • Arthritis    • HTN (hypertension)    • Memory loss 10/4/2017   • Menopause 2/2/2015   • Obesity (BMI 30-39.9) 4/5/2018   • Polymyalgia rheumatica (HCC)      Past Surgical History:   Procedure Laterality Date   • EXPLORATORY LAPAROTOMY  7/3/2014    Performed by Danita Tolbert M.D. at SURGERY [facility] ORS   • BOWEL RESECTION  7/3/2014    Performed by Danita Tolbert M.D. at SURGERY [facility]  "ORS   • PB REVISE SECONDARY VARICOSITY      \"varicose veins operated on \"    • TONSILLECTOMY     • VAGINAL HYSTERECTOMY TOTAL       Social History   Substance Use Topics   • Smoking status: Never Smoker   • Smokeless tobacco: Never Used   • Alcohol use Yes      Comment: small drink every 2-3 months      Social History     Social History Narrative    Retired from [employer]      Family History   Problem Relation Age of Onset   • Heart Disease Mother    • Heart Attack Father    • Diabetes Brother    • Dementia Brother    • Diabetes Maternal Grandmother      Family Status   Relation Status   • Mo  at age 80 y.o.        pt doesn't know the cause   • Fa    • Bro    • Bro    • MGMo (Not Specified)     Allergies: Patient has no known allergies.    ROS  As per HPI above. All other systems reviewed and negative.        Objective   Blood pressure 128/78, pulse 67, temperature 36.8 °C (98.3 °F), resp. rate 16, height 1.626 m (5' 4\"), weight 96.6 kg (213 lb), SpO2 95 %. Body mass index is 36.56 kg/m².    General: alert and oriented, pleasant, cooperative  HEENT: Normocephalic, atraumatic.   Gastrointestinal: large abdominal wall hernia to right of midline and also of left lower quadrant. No hepatosplenomegaly. Bowel sounds normoactive  Psychiatric: appropriate mood and affect. Good insight and appropriate judgment     Assessment and Plan   The following treatment plan was discussed     1. Hernia of abdominal wall  Her hernia now involves the left lower quadrant as well. She was last evaluated by Dr. Tolbert 10/2018 and per her note review it seems she did discuss the risks and benefits of repair, including taking Katharine's BMI into account. However, now that her hernia is worsening did advise that they return to follow up with Dr. Tolbert to re-discuss the risks and benefits.     2. Overactive bladder  - tolterodine ER (DETROL-LA) 2 MG CAPSULE SR 24 HR; Take 1 Cap by mouth every day.  " Dispense: 90 Cap; Refill: 1    3. Polymyalgia rheumatica  Quiescent.       Healthcare maintenance     There are no preventive care reminders to display for this patient.    Return if symptoms worsen or fail to improve.    Pollo Jimenez MD  Internal Medicine  [facility]

## 2019-02-14 ENCOUNTER — HOSPITAL ENCOUNTER (OUTPATIENT)
Dept: LAB | Facility: MEDICAL CENTER | Age: 79
End: 2019-02-14
Attending: UROLOGY
Payer: MEDICARE

## 2019-02-14 LAB
AMBIGUOUS DTTM AMBI4: NORMAL
APPEARANCE UR: CLEAR
BACTERIA #/AREA URNS HPF: ABNORMAL /HPF
BILIRUB UR QL STRIP.AUTO: NEGATIVE
CAOX CRY #/AREA URNS HPF: ABNORMAL /HPF
COLOR UR: YELLOW
EPI CELLS #/AREA URNS HPF: ABNORMAL /HPF
GLUCOSE UR STRIP.AUTO-MCNC: NEGATIVE MG/DL
KETONES UR STRIP.AUTO-MCNC: NEGATIVE MG/DL
LEUKOCYTE ESTERASE UR QL STRIP.AUTO: ABNORMAL
MICRO URNS: ABNORMAL
NITRITE UR QL STRIP.AUTO: NEGATIVE
PH UR STRIP.AUTO: 5 [PH]
PROT UR QL STRIP: NEGATIVE MG/DL
RBC # URNS HPF: ABNORMAL /HPF
RBC UR QL AUTO: NEGATIVE
SP GR UR STRIP.AUTO: 1.02
UROBILINOGEN UR STRIP.AUTO-MCNC: 0.2 MG/DL
WBC #/AREA URNS HPF: ABNORMAL /HPF

## 2019-02-14 PROCEDURE — 81001 URINALYSIS AUTO W/SCOPE: CPT

## 2019-02-14 PROCEDURE — 87086 URINE CULTURE/COLONY COUNT: CPT

## 2019-02-15 LAB
AMBIGUOUS DTTM AMBI4: NORMAL
SIGNIFICANT IND 70042: NORMAL
SITE SITE: NORMAL
SOURCE SOURCE: NORMAL

## 2019-02-17 LAB
BACTERIA UR CULT: NORMAL
SIGNIFICANT IND 70042: NORMAL
SITE SITE: NORMAL
SOURCE SOURCE: NORMAL

## 2019-03-09 DIAGNOSIS — I10 ESSENTIAL HYPERTENSION: ICD-10-CM

## 2019-03-09 DIAGNOSIS — E03.9 ACQUIRED HYPOTHYROIDISM: ICD-10-CM

## 2019-03-11 RX ORDER — LISINOPRIL 20 MG/1
TABLET ORAL
Qty: 90 TAB | Refills: 1 | Status: SHIPPED | OUTPATIENT
Start: 2019-03-11 | End: 2019-04-18 | Stop reason: SDUPTHER

## 2019-03-11 RX ORDER — LEVOTHYROXINE SODIUM 0.03 MG/1
25 TABLET ORAL
Qty: 90 TAB | Refills: 3 | Status: SHIPPED | OUTPATIENT
Start: 2019-03-11 | End: 2020-02-05 | Stop reason: SDUPTHER

## 2019-03-11 RX ORDER — AMLODIPINE BESYLATE 5 MG/1
TABLET ORAL
Qty: 90 TAB | Refills: 1 | Status: SHIPPED | OUTPATIENT
Start: 2019-03-11 | End: 2019-04-18 | Stop reason: SDUPTHER

## 2019-03-11 NOTE — TELEPHONE ENCOUNTER
Was the patient seen in the last year in this department? Yes    Does patient have an active prescription for medications requested? No     Received Request Via: Pharmacy      Pt met protocol?: Yes    OV 1/19       BP Readings from Last 1 Encounters:   01/17/19 128/78     TSH 1/19

## 2019-03-11 NOTE — TELEPHONE ENCOUNTER
Patient was last seen by PCP 1/19. Last TSH read 2.95 (1/19). Will refill for 12 months.    Lab Results   Component Value Date/Time    SODIUM 135 09/20/2018 02:47 PM    POTASSIUM 4.1 09/20/2018 02:47 PM    CHLORIDE 102 09/20/2018 02:47 PM    CO2 23 09/20/2018 02:47 PM    GLUCOSE 84 09/20/2018 02:47 PM    BUN 22 09/20/2018 02:47 PM    CREATININE 0.98 09/20/2018 02:47 PM

## 2019-04-18 DIAGNOSIS — I10 ESSENTIAL HYPERTENSION: ICD-10-CM

## 2019-04-18 RX ORDER — LISINOPRIL 20 MG/1
20 TABLET ORAL
Qty: 100 TAB | Refills: 1 | Status: SHIPPED | OUTPATIENT
Start: 2019-04-18 | End: 2019-11-01 | Stop reason: SDUPTHER

## 2019-04-18 RX ORDER — AMLODIPINE BESYLATE 5 MG/1
5 TABLET ORAL
Qty: 100 TAB | Refills: 1 | Status: SHIPPED | OUTPATIENT
Start: 2019-04-18 | End: 2020-02-05 | Stop reason: SDUPTHER

## 2019-05-30 ENCOUNTER — TELEPHONE (OUTPATIENT)
Dept: MEDICAL GROUP | Facility: PHYSICIAN GROUP | Age: 79
End: 2019-05-30

## 2019-05-30 NOTE — TELEPHONE ENCOUNTER
ESTABLISHED PATIENT PRE-VISIT PLANNING     Patient was NOT contacted to complete PVP.    1.  Reviewed notes from the last few office visits within the medical group: Yes    2.  If any orders were placed at last visit or intended to be done for this visit (i.e. 6 mos follow-up), do we have Results/Consult Notes?        •  Labs - Labs were not ordered at last office visit.       •  Imaging - Imaging was not ordered at last office visit.       •  Referrals - No referrals were ordered at last office visit.    3. Is this appointment scheduled as a Hospital Follow-Up? No    4.  Immunizations were updated in [facility] using WebIZ?: Yes       •  Web Iz Recommendations: FLU, TD, VARICELLA (Chicken Pox)  and SHINGRIX (Shingles)    5.  Patient is due for the following Health Maintenance Topics:   Health Maintenance Due   Topic Date Due   • IMM ZOSTER VACCINES (1 of 2) 12/08/1988   • Annual Wellness Visit  04/06/2019     6. Orders for overdue Health Maintenance topics pended in Pre-Charting? NO    7.  AHA (MDX) form printed for Provider? No, already completed    8.  Patient was NOT informed to arrive 15 min prior to their scheduled appointment and bring in their medication bottles.

## 2019-05-31 ENCOUNTER — OFFICE VISIT (OUTPATIENT)
Dept: MEDICAL GROUP | Facility: PHYSICIAN GROUP | Age: 79
End: 2019-05-31
Payer: MEDICARE

## 2019-05-31 VITALS
TEMPERATURE: 97.8 F | OXYGEN SATURATION: 95 % | RESPIRATION RATE: 16 BRPM | WEIGHT: 210 LBS | DIASTOLIC BLOOD PRESSURE: 78 MMHG | HEIGHT: 64 IN | HEART RATE: 68 BPM | SYSTOLIC BLOOD PRESSURE: 124 MMHG | BODY MASS INDEX: 35.85 KG/M2

## 2019-05-31 DIAGNOSIS — K43.9 HERNIA OF ABDOMINAL WALL: ICD-10-CM

## 2019-05-31 PROCEDURE — 99213 OFFICE O/P EST LOW 20 MIN: CPT | Performed by: INTERNAL MEDICINE

## 2019-05-31 NOTE — PROGRESS NOTES
Annual Health Assessment Questions:    1.  Are you currently engaging in any exercise or physical activity? No    2.  How would you describe your mood or emotional well-being today? fair    3.  Have you had any falls in the last year? No    4.  Have you noticed any problems with your balance or had difficulty walking? Yes    5.  In the last six months have you experienced any leakage of urine? Yes- on medication    6. DPA/Advanced Directive: Patient has Advanced Directive on file.      PRIMARY CARE CLINIC FOLLOW UP VISIT  Chief Complaint   Patient presents with   • Other     DMV Placard     History of Present Illness     Hernia of abdominal wall  Given her hernia (evaluated by Dr. Tolbert) she cannot ambulate without stopping to rest. Dr. Tolbert hasn't recommended surgery. Requesting a handicap placard.     Current Outpatient Prescriptions   Medication Sig Dispense Refill   • amLODIPine (NORVASC) 5 MG Tab Take 1 Tab by mouth every day. 100 Tab 1   • lisinopril (PRINIVIL) 20 MG Tab Take 1 Tab by mouth every day. 100 Tab 1   • levothyroxine (SYNTHROID) 25 MCG Tab TAKE 1 TAB BY MOUTH EVERY MORNING ON AN EMPTY STOMACH. 90 Tab 3   • tolterodine ER (DETROL-LA) 2 MG CAPSULE SR 24 HR Take 1 Cap by mouth every day. 90 Cap 1   • hydrocortisone 1 % Cream Apply generous amount to affected areas twice daily 1 Tube 1   • aspirin 81 MG tablet Take 81 mg by mouth every day.     • Cholecalciferol (VITAMIN D) 2000 UNITS Cap Take 1 Cap by mouth every day. 30 Cap      No current facility-administered medications for this visit.      Past Medical History:   Diagnosis Date   • Acquired hypothyroidism 7/18/2017   • Arthritis    • HTN (hypertension)    • Memory loss 10/4/2017   • Menopause 2/2/2015   • Obesity (BMI 30-39.9) 4/5/2018   • Polymyalgia rheumatica (HCC)      Past Surgical History:   Procedure Laterality Date   • EXPLORATORY LAPAROTOMY  7/3/2014    Performed by Danita Tolbert M.D. at SURGERY [facility]   • BOWEL RESECTION   "7/3/2014    Performed by Danita Tolbert M.D. 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        pt doesn't know the cause   • Fa    • Bro    • Bro    • MGMo (Not Specified)     Allergies: Patient has no known allergies.    ROS  As per HPI above. All other systems reviewed and negative.        Objective   /78 (BP Cuff Size: Large adult)   Pulse 68   Temp 36.6 °C (97.8 °F)   Resp 16   Ht 1.626 m (5' 4\")   Wt 95.3 kg (210 lb)   SpO2 95%  Body mass index is 36.05 kg/m².    General: alert and oriented, pleasant, cooperative  HEENT: Normocephalic, atraumatic.   Psychiatric: appropriate mood and affect. Good insight and appropriate judgment     Assessment and Plan   The following treatment plan was discussed     1. Hernia of abdominal wall  Completed DMV placard, scanned into media tab.       Healthcare maintenance     Health Maintenance Due   Topic Date Due   • Annual Wellness Visit  2019       Return if symptoms worsen or fail to improve.    Pollo Jimenez MD  Internal Medicine  [facility]                   "

## 2019-05-31 NOTE — ASSESSMENT & PLAN NOTE
Given her hernia (evaluated by Dr. Tolbert) she cannot ambulate without stopping to rest. Dr. Tolbert hasn't recommended surgery. Requesting a handicap placard.

## 2019-06-18 ENCOUNTER — OFFICE VISIT (OUTPATIENT)
Dept: MEDICAL GROUP | Facility: PHYSICIAN GROUP | Age: 79
End: 2019-06-18
Payer: MEDICARE

## 2019-06-18 VITALS
WEIGHT: 210 LBS | BODY MASS INDEX: 35.85 KG/M2 | HEIGHT: 64 IN | RESPIRATION RATE: 16 BRPM | DIASTOLIC BLOOD PRESSURE: 64 MMHG | OXYGEN SATURATION: 96 % | TEMPERATURE: 98.9 F | HEART RATE: 82 BPM | SYSTOLIC BLOOD PRESSURE: 134 MMHG

## 2019-06-18 PROCEDURE — 99213 OFFICE O/P EST LOW 20 MIN: CPT | Performed by: INTERNAL MEDICINE

## 2019-06-18 RX ORDER — TOLTERODINE 4 MG/1
CAPSULE, EXTENDED RELEASE ORAL
COMMUNITY
Start: 2019-06-17 | End: 2019-07-18

## 2019-06-18 NOTE — PROGRESS NOTES
"PRIMARY CARE CLINIC FOLLOW UP VISIT  Chief Complaint   Patient presents with   • Mass     Lt shoulder area x 1 month    • Hernia     abdominal wall     History of Present Illness     Mass  Has been noting a mass of her left anterior shoulder for the past month. Isn't able to lift her arm without a lot of pain.     Current Outpatient Prescriptions   Medication Sig Dispense Refill   • tolterodine ER (DETROL LA) 4 MG CAPSULE SR 24 HR      • amLODIPine (NORVASC) 5 MG Tab Take 1 Tab by mouth every day. 100 Tab 1   • lisinopril (PRINIVIL) 20 MG Tab Take 1 Tab by mouth every day. 100 Tab 1   • levothyroxine (SYNTHROID) 25 MCG Tab TAKE 1 TAB BY MOUTH EVERY MORNING ON AN EMPTY STOMACH. 90 Tab 3   • tolterodine ER (DETROL-LA) 2 MG CAPSULE SR 24 HR Take 1 Cap by mouth every day. 90 Cap 1   • hydrocortisone 1 % Cream Apply generous amount to affected areas twice daily 1 Tube 1   • aspirin 81 MG tablet Take 81 mg by mouth every day.     • Cholecalciferol (VITAMIN D) 2000 UNITS Cap Take 1 Cap by mouth every day. 30 Cap      No current facility-administered medications for this visit.      Past Medical History:   Diagnosis Date   • Acquired hypothyroidism 7/18/2017   • Arthritis    • HTN (hypertension)    • Memory loss 10/4/2017   • Menopause 2/2/2015   • Obesity (BMI 30-39.9) 4/5/2018   • Polymyalgia rheumatica (HCC)      Past Surgical History:   Procedure Laterality Date   • EXPLORATORY LAPAROTOMY  7/3/2014    Performed by Danita Tolbert M.D. at SURGERY [facility]   • BOWEL RESECTION  7/3/2014    Performed by Danita Tolbert M.D. at SURGERY [facility]   • PB REVISE SECONDARY VARICOSITY      \"varicose veins operated on \"    • TONSILLECTOMY     • VAGINAL HYSTERECTOMY TOTAL       Social History   Substance Use Topics   • Smoking status: Never Smoker   • Smokeless tobacco: Never Used   • Alcohol use Yes      Comment: small drink every 2-3 months      Social History     Social History Narrative    Retired from mortgage " "industry      Family History   Problem Relation Age of Onset   • Heart Disease Mother    • Heart Attack Father    • Diabetes Brother    • Dementia Brother    • Diabetes Maternal Grandmother      Family Status   Relation Status   • Mo  at age 80 y.o.        pt doesn't know the cause   • Fa    • Bro    • Bro    • MGMo (Not Specified)     Allergies: Patient has no known allergies.    ROS  As per HPI above. All other systems reviewed and negative.        Objective   /64   Pulse 82   Temp 37.2 °C (98.9 °F)   Resp 16   Ht 1.626 m (5' 4\")   Wt 95.3 kg (210 lb)   SpO2 96%  Body mass index is 36.05 kg/m².    General: alert and oriented, pleasant, cooperative  HEENT: Normocephalic, atraumatic.   MSK: large mobile mass of the anterior aspect of her left shoulder   Psychiatric: appropriate mood and affect. Good insight and appropriate judgment     Assessment and Plan   The following treatment plan was discussed     1. Mass  Likely a lipoma, she will follow up with Dr. Tolbert who also follows her for her abdominal hernia.     Healthcare maintenance     Health Maintenance Due   Topic Date Due   • Annual Wellness Visit  2019     No Follow-up on file.    Plolo Jimenez MD  Internal Medicine  [facility]                   "

## 2019-06-18 NOTE — ASSESSMENT & PLAN NOTE
Has been noting a mass of her left anterior shoulder for the past month. Isn't able to lift her arm without a lot of pain.

## 2019-07-08 ENCOUNTER — TELEPHONE (OUTPATIENT)
Dept: MEDICAL GROUP | Facility: PHYSICIAN GROUP | Age: 79
End: 2019-07-08

## 2019-07-08 DIAGNOSIS — M25.512 LEFT SHOULDER PAIN, UNSPECIFIED CHRONICITY: ICD-10-CM

## 2019-07-08 NOTE — TELEPHONE ENCOUNTER
----- Message from Helena Angel sent at 7/5/2019  1:38 PM PDT -----  Regarding: left shoulder   Contact: [phone]  Patient is asking for x ray of left shoulder, she stated that the surgeon said for her to ask you for xray could be arthritis or rotator cuff if questions please call.  Scheduled for [facility] 7/10/19 to go over x ray if ordered in time otherwise she is coming in for order Thank you

## 2019-07-09 ENCOUNTER — TELEPHONE (OUTPATIENT)
Dept: MEDICAL GROUP | Facility: PHYSICIAN GROUP | Age: 79
End: 2019-07-09

## 2019-07-09 NOTE — TELEPHONE ENCOUNTER
Future Appointments       Provider Department Center    7/10/2019 10:55 AM Pollo Jimenez M.D. [facility]        ESTABLISHED PATIENT PRE-VISIT PLANNING     Patient was NOT contacted to complete PVP.       1.  Reviewed notes from the last few office visits within the medical group: Yes    2.  If any orders were placed at last visit or intended to be done for this visit (i.e. 6 mos follow-up), do we have Results/Consult Notes?        •  Labs - Labs were not ordered at last office visit.       •  Imaging - Imaging ordered, NOT completed. Patient advised to complete prior to next appointment.       •  Referrals - No referrals were ordered at last office visit.    3. Is this appointment scheduled as a Hospital Follow-Up? No    4.  Immunizations were updated in [employer] using WebIZ?: Yes       •  Web Iz Recommendations: FLU, TD, VARICELLA (Chicken Pox)  and SHINGRIX (Shingles)    5.  Patient is due for the following Health Maintenance Topics:   Health Maintenance Due   Topic Date Due   • Annual Wellness Visit  04/06/2019     6. Orders for overdue Health Maintenance topics pended in Pre-Charting? NO    7.  AHA (MDX) form printed for Provider? No, already completed    8.  Patient was NOT informed to arrive 15 min prior to their scheduled appointment and bring in their medication bottles.

## 2019-07-10 ENCOUNTER — HOSPITAL ENCOUNTER (OUTPATIENT)
Dept: RADIOLOGY | Facility: MEDICAL CENTER | Age: 79
End: 2019-07-10
Attending: INTERNAL MEDICINE
Payer: MEDICARE

## 2019-07-10 DIAGNOSIS — M25.512 LEFT SHOULDER PAIN, UNSPECIFIED CHRONICITY: ICD-10-CM

## 2019-07-10 PROCEDURE — 73030 X-RAY EXAM OF SHOULDER: CPT | Mod: LT

## 2019-07-16 ENCOUNTER — TELEPHONE (OUTPATIENT)
Dept: MEDICAL GROUP | Facility: PHYSICIAN GROUP | Age: 79
End: 2019-07-16

## 2019-07-16 NOTE — TELEPHONE ENCOUNTER
Future Appointments       Provider Department Center    7/18/2019 8:15 AM Pollo Jimenez M.D. [facility]        ESTABLISHED PATIENT PRE-VISIT PLANNING     Patient was NOT contacted to complete PVP.       1.  Reviewed notes from the last few office visits within the medical group: Yes    2.  If any orders were placed at last visit or intended to be done for this visit (i.e. 6 mos follow-up), do we have Results/Consult Notes?        •  Labs - Labs were not ordered at last office visit.       •  Imaging - Imaging ordered, completed and results are in chart.       •  Referrals - No referrals were ordered at last office visit.    3. Is this appointment scheduled as a Hospital Follow-Up? No    4.  Immunizations were updated in Epic using WebIZ?: Yes       •  Web Iz Recommendations: FLU, TD, VARICELLA (Chicken Pox)  and SHINGRIX (Shingles)    5.  Patient is due for the following Health Maintenance Topics:   Health Maintenance Due   Topic Date Due   • Annual Wellness Visit  04/06/2019     6. Orders for overdue Health Maintenance topics pended in Pre-Charting? NO    7.  AHA (MDX) form printed for Provider? No, already completed    8.  Patient was NOT informed to arrive 15 min prior to their scheduled appointment and bring in their medication bottles.

## 2019-07-18 ENCOUNTER — OFFICE VISIT (OUTPATIENT)
Dept: MEDICAL GROUP | Facility: PHYSICIAN GROUP | Age: 79
End: 2019-07-18
Payer: MEDICARE

## 2019-07-18 VITALS
OXYGEN SATURATION: 95 % | DIASTOLIC BLOOD PRESSURE: 72 MMHG | RESPIRATION RATE: 16 BRPM | HEIGHT: 64 IN | TEMPERATURE: 97 F | WEIGHT: 207 LBS | HEART RATE: 65 BPM | SYSTOLIC BLOOD PRESSURE: 130 MMHG | BODY MASS INDEX: 35.34 KG/M2

## 2019-07-18 DIAGNOSIS — M25.512 ACUTE PAIN OF LEFT SHOULDER: ICD-10-CM

## 2019-07-18 PROCEDURE — 99214 OFFICE O/P EST MOD 30 MIN: CPT | Performed by: INTERNAL MEDICINE

## 2019-07-18 NOTE — ASSESSMENT & PLAN NOTE
Has been having left shoulder pain for the last month. Denies any triggering event. Having difficulty lifting her shoulder overhead and with any range of motion. Denies numbness, tingling of the arm.

## 2019-07-18 NOTE — PROGRESS NOTES
"PRIMARY CARE CLINIC FOLLOW UP VISIT  Chief Complaint   Patient presents with   • Shoulder Pain     X-Ray      History of Present Illness     Left shoulder pain  Has been having left shoulder pain for the last month. Denies any triggering event. Having difficulty lifting her shoulder overhead and with any range of motion. Denies numbness, tingling of the arm.     Current Outpatient Prescriptions   Medication Sig Dispense Refill   • amLODIPine (NORVASC) 5 MG Tab Take 1 Tab by mouth every day. 100 Tab 1   • lisinopril (PRINIVIL) 20 MG Tab Take 1 Tab by mouth every day. 100 Tab 1   • levothyroxine (SYNTHROID) 25 MCG Tab TAKE 1 TAB BY MOUTH EVERY MORNING ON AN EMPTY STOMACH. 90 Tab 3   • tolterodine ER (DETROL-LA) 2 MG CAPSULE SR 24 HR Take 1 Cap by mouth every day. 90 Cap 1   • aspirin 81 MG tablet Take 81 mg by mouth every day.     • Cholecalciferol (VITAMIN D) 2000 UNITS Cap Take 1 Cap by mouth every day. 30 Cap      No current facility-administered medications for this visit.      Past Medical History:   Diagnosis Date   • Acquired hypothyroidism 7/18/2017   • Arthritis    • HTN (hypertension)    • Memory loss 10/4/2017   • Menopause 2/2/2015   • Obesity (BMI 30-39.9) 4/5/2018   • Polymyalgia rheumatica (HCC)      Past Surgical History:   Procedure Laterality Date   • EXPLORATORY LAPAROTOMY  7/3/2014    Performed by Danita Tolbert M.D. at SURGERY [facility]   • BOWEL RESECTION  7/3/2014    Performed by Danita Tolbert M.D. 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        pt doesn't know the cause   • Fa    • Bro    • Bro    • MGMo (Not Specified)     Allergies: Patient has no known allergies.    ROS  As per HPI above. All other systems reviewed and negative.        Objective   /72   Pulse 65   Temp 36.1 °C (97 °F)   Resp 16   Ht 1.626 m (5' 4\")   Wt 93.9 kg (207 lb)   SpO2 95%  Body mass index is 35.53 kg/m².    General: alert and oriented, pleasant, cooperative  HEENT: Normocephalic, atraumatic.   MSK: very limited range of motion of left upper extremity   Psychiatric: appropriate mood and affect. Good insight and appropriate judgment     Assessment and Plan   The following treatment plan was discussed     1. Acute pain of left shoulder  May have arthritis, frozen shoulder. Discussed physical therapy and then sports medicine if no improvement.   - REFERRAL TO PHYSICAL THERAPY Reason for Therapy: Eval/Treat/Report  - REFERRAL TO SPORTS MEDICINE    Healthcare maintenance     Health Maintenance Due   Topic Date Due   • Annual Wellness Visit  2019     No Follow-up on file.    Pollo Jimenez MD  Internal Medicine  [facility]                   "

## 2019-07-22 ENCOUNTER — OFFICE VISIT (OUTPATIENT)
Dept: MEDICAL GROUP | Facility: CLINIC | Age: 79
End: 2019-07-22
Payer: MEDICARE

## 2019-07-22 VITALS
DIASTOLIC BLOOD PRESSURE: 76 MMHG | HEIGHT: 64 IN | WEIGHT: 207 LBS | HEART RATE: 74 BPM | OXYGEN SATURATION: 97 % | BODY MASS INDEX: 35.34 KG/M2 | RESPIRATION RATE: 16 BRPM | SYSTOLIC BLOOD PRESSURE: 118 MMHG | TEMPERATURE: 96.5 F

## 2019-07-22 DIAGNOSIS — M75.42 SUBACROMIAL IMPINGEMENT OF LEFT SHOULDER: ICD-10-CM

## 2019-07-22 DIAGNOSIS — M75.81 ROTATOR CUFF TENDINITIS, RIGHT: ICD-10-CM

## 2019-07-22 PROCEDURE — 99203 OFFICE O/P NEW LOW 30 MIN: CPT | Mod: 25 | Performed by: FAMILY MEDICINE

## 2019-07-22 PROCEDURE — 20610 DRAIN/INJ JOINT/BURSA W/O US: CPT | Mod: LT | Performed by: FAMILY MEDICINE

## 2019-07-22 RX ORDER — TRIAMCINOLONE ACETONIDE 40 MG/ML
40 INJECTION, SUSPENSION INTRA-ARTICULAR; INTRAMUSCULAR ONCE
Status: COMPLETED | OUTPATIENT
Start: 2019-07-22 | End: 2019-07-22

## 2019-07-22 RX ADMIN — TRIAMCINOLONE ACETONIDE 40 MG: 40 INJECTION, SUSPENSION INTRA-ARTICULAR; INTRAMUSCULAR at 15:32

## 2019-07-22 NOTE — PROCEDURES
PROCEDURE NOTE:  left Shoulder subacromial injection  Risks and benefits discussed  Informed consent obtained  Shoulder prepped in sterile fashion utilizing a posterior approach  40 mg of Kenalog and 5 cc of Marcaine injected into the subacromial space  Vapocoolant spray was utilized  Patient tolerated the procedure well  Postprocedure care and red flags discussed

## 2019-07-22 NOTE — PROGRESS NOTES
"CHIEF COMPLAINT:  Katharine Gee female presenting at the request of Pollo Jimenez M.D. for evaluation of Shoulder pain.     Katharine Gee is complaining of left shoulder pain  Insidious onset without injury approximately mid June 2019  Pain is at the deltoid region  Quality is aching, sharp  Pain is Non-radiating  Aggravated by movement and overhead activity  Improved with  nothing   previous shoulder pain which responded to corticosteroid injections \"several years ago\"  Prior Treatments: Seen by PCP  Prior studies: X-Ray   Medications tried for pain include: aspirin  Mechanical Symptom history: No Locking    Sewing and kaley as well as house work    REVIEW OF SYSTEMS  No Nausea, No Vomiting, No Chest Pain, No Shortness of Breath, No Dizziness, Headache and c-spine    PAST MEDICAL HISTORY:   History reviewed. No pertinent past medical history.    PMH:  has a past medical history of Acquired hypothyroidism (7/18/2017); Arthritis; HTN (hypertension); Memory loss (10/4/2017); Menopause (2/2/2015); Obesity (BMI 30-39.9) (4/5/2018); and Polymyalgia rheumatica (HCC).  MEDS:   Current Outpatient Prescriptions:   •  amLODIPine (NORVASC) 5 MG Tab, Take 1 Tab by mouth every day., Disp: 100 Tab, Rfl: 1  •  lisinopril (PRINIVIL) 20 MG Tab, Take 1 Tab by mouth every day., Disp: 100 Tab, Rfl: 1  •  levothyroxine (SYNTHROID) 25 MCG Tab, TAKE 1 TAB BY MOUTH EVERY MORNING ON AN EMPTY STOMACH., Disp: 90 Tab, Rfl: 3  •  tolterodine ER (DETROL-LA) 2 MG CAPSULE SR 24 HR, Take 1 Cap by mouth every day., Disp: 90 Cap, Rfl: 1  •  aspirin 81 MG tablet, Take 81 mg by mouth every day., Disp: , Rfl:   •  Cholecalciferol (VITAMIN D) 2000 UNITS Cap, Take 1 Cap by mouth every day., Disp: 30 Cap, Rfl:   ALLERGIES: No Known Allergies  SURGHX:   Past Surgical History:   Procedure Laterality Date   • EXPLORATORY LAPAROTOMY  7/3/2014    Performed by Danita Tolbert M.D. at [facility]   • BOWEL RESECTION  " "7/3/2014    Performed by Danita Tolbert M.D. at SURGERY [facility]S   • PB REVISE SECONDARY VARICOSITY      \"varicose veins operated on \"    • TONSILLECTOMY     • VAGINAL HYSTERECTOMY TOTAL       SOCHX:  reports that she has never smoked. She has never used smokeless tobacco. She reports that she drinks alcohol. She reports that she does not use drugs.  FH: Family history was reviewed, no pertinent findings to report     PHYSICAL EXAM:  /76 (BP Location: Right arm, Patient Position: Sitting, BP Cuff Size: Large adult)   Pulse 74   Temp 35.8 °C (96.5 °F) (Temporal)   Resp 16   Ht 1.626 m (5' 4\")   Wt 93.9 kg (207 lb)   SpO2 97%   BMI 35.53 kg/m²      obese in no apparent distress, alert and oriented x 3.  Gait: normal    Cervical spine:  Range of motion Slightly limited with Extension and Slightly limited with Lateral rotation  Spurling's testing is NEGATIVE  Cervical spine tenderness NEGATIVE    Strength testing:     Deltoid, bilateral 5/5  Bicep, bilateral 5/5  Tricep, bilateral 5/5  Wrist Extension, bilateral 5/5  Wrist Flexion, bilateral 5/5  Finger Abduction, bilateral 5/5    Sensation:  INTACT Bilaterally    Reflexes:   Biceps: R 2+/L 2+  Triceps: R 2+/L  2+  Brachial radialis R 2+/L  2+  Tijerina's testing is NEGATIVE  The arms are otherwise neurovascularly intact     Shoulder Exam:    RIGHT Shoulder:  No visible swelling   Range of motion INTACT  Tenderness: Non-tender  Empty Can Testing 5/5  Internal Rotation 5/5  External Rotation 5/5  Lift Off Testing 5/5  Impingement testing Soares  NEGATIVE  Neer's testing NEGATIVE  Apprehension testing NEGATIVE    LEFT Shoulder:  No visible swelling   Range of motion INTACT  Tenderness: Non-tender  Empty Can Testing 5/5  Internal Rotation 5/5  External Rotation 5/5  Lift Off Testing 5/5  Impingement testing Soares  POSITIVE  Neer's testing POSITIVE  Apprehension testing POSITIVE    Additional Findings: Flexed Posture    1. Subacromial impingement of " left shoulder  triamcinolone acetonide (KENALOG-40) injection 40 mg   2. Rotator cuff tendinitis, right  triamcinolone acetonide (KENALOG-40) injection 40 mg     LEFT subacromial corticosteroid injection performed in the office TODAY (July 22, 2019)     [facility] [facility] has already been ordered and processed by Dr. Barbara Chapman in about 4 weeks (around 8/19/2019).  To see how she is doing formal physical therapy and after LEFT subacromial corticosteroid injection  Hopefully, therapy will help together with the injection, otherwise if her symptoms persist consider referral for subacromial decompression        7/10/2019 11:18 AM    HISTORY/REASON FOR EXAM:  Atraumatic Pain/Swelling/Deformity  Left shoulder pain x 1 month    TECHNIQUE/EXAM DESCRIPTION AND NUMBER OF VIEWS:  3 views of the LEFT shoulder.    COMPARISON: 8/20/2014    FINDINGS:  Small subacromial enthesophyte.  No acute fracture or dislocation.  Moderate osteoarthritis of the AC joint and glenohumeral joint.     Impression         Small subacromial enthesophyte.    Moderate osteoarthritis of the AC joint and glenohumeral joint.     done elsewhere and reviewed independently by me    Thank you Pollo Jimenez M.D. for allowing me to participate in caring for your patient.

## 2019-07-30 ENCOUNTER — PHYSICAL THERAPY (OUTPATIENT)
Dept: PHYSICAL THERAPY | Facility: REHABILITATION | Age: 79
End: 2019-07-30
Attending: INTERNAL MEDICINE
Payer: MEDICARE

## 2019-07-30 DIAGNOSIS — M25.512 ACUTE PAIN OF LEFT SHOULDER: ICD-10-CM

## 2019-07-30 PROCEDURE — 97161 PT EVAL LOW COMPLEX 20 MIN: CPT

## 2019-07-30 PROCEDURE — 97110 THERAPEUTIC EXERCISES: CPT

## 2019-07-30 SDOH — ECONOMIC STABILITY: GENERAL: QUALITY OF LIFE: FAIR

## 2019-07-30 ASSESSMENT — ENCOUNTER SYMPTOMS
PAIN SCALE AT LOWEST: 3
EXACERBATED BY: ACTIVITY
ALLEVIATING FACTORS: POSITIONING
PAIN TIMING: CONSTANT
PAIN SCALE AT HIGHEST: 6
QUALITY: DULL ACHE
PAIN SCALE: 5

## 2019-07-30 NOTE — OP THERAPY EVALUATION
Outpatient Physical Therapy  INITIAL EVALUATION    [facility] [place]  [address], [address]  [place] [address]  Phone:  [phone]  Fax:  [phone]    Date of Evaluation: 2019    Patient: Katharine Gee  YOB: 1940  MRN: [id number]     Referring Provider: Pollo Jimenez M.D.  [address] [place] [address], [place] [address]   Referring Diagnosis Acute pain of left shoulder [M25.512]     Time Calculation  Start time: 1000  Stop time: 1100 Time Calculation (min): 60 minutes     Physical Therapy Occurrence Codes    Date of onset of impairment:  19   Date physical therapy care plan established or reviewed:  19   Date physical therapy treatment started:  19          Chief Complaint: Shoulder Problem    Visit Diagnoses     ICD-10-CM   1. Acute pain of left shoulder M25.512         Subjective:   History of Present Illness:     Date of onset:  2019  Quality of life:  Fair  Prior level of function:  First bout of shoulder pain  Pain:     Current pain ratin    At best pain rating:  3    At worst pain ratin    Quality:  Dull ache    Pain timing:  Constant    Relieving factors:  Positioning    Aggravating factors:  Activity  Social Support:     Lives in:  One-story house  Diagnostic Tests:     X-ray: abnormal    Activities of Daily Living:     Patient reported ADL status: Limited with reaching  Limited with over head tasks  Limited with lifting  Patient Goals:     Patient goals for therapy:  Increased strength, decreased pain and increased motion    Patient is a 78 y.o. female that presents to therapy with complaints of left shoulder pain. States that symptoms were insidious in onset. Reports the pain quality to be dull, constant and are primarily along the lateral aspect of the humerus. Reports that symptoms now not changing. States that aggravating factors are movement.  States that easng factors are positioning.  Denies red flags.   Past  "Medical History:   Diagnosis Date   • Acquired hypothyroidism 7/18/2017   • Arthritis    • HTN (hypertension)    • Memory loss 10/4/2017   • Menopause 2/2/2015   • Obesity (BMI 30-39.9) 4/5/2018   • Polymyalgia rheumatica (HCC)      Past Surgical History:   Procedure Laterality Date   • EXPLORATORY LAPAROTOMY  7/3/2014    Performed by Danita Tolbert M.D. at SURGERY [facility]   • BOWEL RESECTION  7/3/2014    Performed by Danita Tolbert M.D. at SURGERY [facility]   • PB REVISE SECONDARY VARICOSITY      \"varicose veins operated on \"    • TONSILLECTOMY     • VAGINAL HYSTERECTOMY TOTAL       Social History   Substance Use Topics   • Smoking status: Never Smoker   • Smokeless tobacco: Never Used   • Alcohol use Yes      Comment: small drink every 2-3 months      Family and Occupational History     Social History   • Marital status:      Spouse name: N/A   • Number of children: N/A   • Years of education: N/A     Occupational History   • retired Other       Objective     Neurological Testing     Reflexes   Left   Biceps (C5/C6): normal (2+)  Triceps (C7): normal (2+)  Tijernia's reflex: negative    Right   Biceps (C5/C6): normal (2+)  Triceps (C7): normal (2+)  Tijerina's reflex: negative    Myotome testing   Cervical (left)   C5 (deltoid): 3 (P)  C6 (biceps): 5  C7 (triceps): 5  C8 (thumb extension): 5  T1 (intrinsics): 5    Cervical (right)   C5 (deltoid): 4+  C6 (biceps): 5  C7 (triceps): 5  C8 (thumb extension): 5  T1 (intrinsics): 5    Dermatome testing   Cervical (left)   All left cervical dermatomes intact    Cervical (right)   All right cervical dermatomes intact    Palpation   Left   Tenderness of the supraspinatus.     Right   No palpable tenderness to the supraspinatus.     Active Range of Motion   Left Shoulder   Flexion: 92 degrees   Abduction: 68 degrees   External rotation 0°: 54 degrees   Internal rotation BTB: sacrum     Right Shoulder   Flexion: 154 degrees   Abduction: 160 degrees "   External rotation 0°: 67 degrees   Internal rotation BTB: T8     Passive Range of Motion   Left Shoulder   Flexion: 90 degrees   External rotation 45°: 32 degrees     Scapular Mobility   Left Shoulder   Scapular mobility: poor    Right Shoulder   Scapular mobility: WFL    Joint Play   Left Shoulder     Posterior capsule: hypomobile    Inferior capsule: hypomobile    Right Shoulder     Posterior capsule: within functional limits    Inferior capsule: within functional limits    Strength:      Left Shoulder   Planes of Motion   Flexion: 3 (P)   External rotation at 0°: 4+   Internal rotation at 0°: 3+ (P)     Tests     Left Shoulder   Positive belly press, empty can, full can, Hawkin's, lift-off and Neer's.   Negative AC shear and Speed's (pain with pronation).     Additional Tests Details  L ER (-)        Therapeutic Exercises (CPT 64621):     1. Shouler ER/IR pulls, x10 / fatigue 2x day    2. Table slides/ pendulums, x20 1-2x day    3. Scap ret, x10      Time-based treatments/modalities:  Therapeutic exercise minutes (CPT 41097): 10 minutes       Assessment, Response and Plan:   Impairments: abnormal or restricted ROM, activity intolerance, impaired physical strength and pain with function    Assessment details:  Patient presents with signs and symptoms consistent with a rotator cuff syndrome with impingement. Patient limitations include weakness, decreased ROM, and pain. Patient demonstrated an empty end feel with PROM attempts. Patient will benefit from skilled therapy to improve the aforementioned deficits and decrease further functional decline.   Goals:   Short Term Goals:   1) Patient's ER IR strength will improve by a half muscle grade to facilitate improved shoulder control.  2) Patient's shoulder flexion will improve by 15deg to facilitate improved at heal level motion.  Short term goal time span:  2-4 weeks      Long Term Goals:    1) Patient's shoulder flexion will improve by 30deg to allow for overhead  motion.  2) Patient's SPADI will improve by 15 to demonstrate functional improvement  Long term goal time span:  6-8 weeks    Plan:   Therapy options:  Physical therapy treatment to continue  Planned therapy interventions:  E Stim Unattended (CPT 77798), Hot or Cold Pack Therapy (CPT 88569), Manual Therapy (CPT 32610), Neuromuscular Re-education (CPT 37996) and Therapeutic Exercise (CPT 28376)  Frequency:  2x week  Duration in weeks:  6  Discussed with:  Patient      Functional Limitations and Severity Modifiers  PT Functional Assessment Tool Used: SPADI  PT Functional Assessment Score: 49     Referring provider co-signature:  I have reviewed this plan of care and my co-signature certifies the need for services.  Certification Dates:   From 7/30/19     To 9/10/19    Physician Signature: ________________________________ Date: ______________

## 2019-08-01 ENCOUNTER — APPOINTMENT (OUTPATIENT)
Dept: PHYSICAL THERAPY | Facility: REHABILITATION | Age: 79
End: 2019-08-01
Attending: INTERNAL MEDICINE
Payer: MEDICARE

## 2019-08-09 ENCOUNTER — TELEPHONE (OUTPATIENT)
Dept: PHYSICAL THERAPY | Facility: REHABILITATION | Age: 79
End: 2019-08-09

## 2019-08-09 NOTE — OP THERAPY DISCHARGE SUMMARY
Outpatient Physical Therapy  DISCHARGE SUMMARY NOTE      [facility] [place]  [address], [address]  [place] [address]  Phone:  [phone]  Fax:  [phone]    Date of Visit: 08/09/2019    Patient: Katharine Gee  YOB: 1940  MRN: [id number]     Referring Provider: Pollo Jimenez M.D.   Referring Diagnosis Acute pain of left shoulder [M25.512]     Physical Therapy Occurrence Codes    Date of onset of impairment:  1/30/19   Date physical therapy care plan established or reviewed:  7/30/19   Date physical therapy treatment started:  7/30/19          Your patient is being discharged from Physical Therapy with the following comments:   · Discharge     Comments:  Discharge patient due to high cost of therapy.      Limitations Remaining:  See eval    Recommendations:  Discharge due to cost of therapy per patient.     Robert Walsh, PT, DPT    Date: 8/9/2019

## 2019-08-14 ENCOUNTER — APPOINTMENT (OUTPATIENT)
Dept: PHYSICAL THERAPY | Facility: REHABILITATION | Age: 79
End: 2019-08-14
Attending: INTERNAL MEDICINE
Payer: MEDICARE

## 2019-08-16 ENCOUNTER — APPOINTMENT (OUTPATIENT)
Dept: PHYSICAL THERAPY | Facility: REHABILITATION | Age: 79
End: 2019-08-16
Attending: INTERNAL MEDICINE
Payer: MEDICARE

## 2019-08-19 ENCOUNTER — APPOINTMENT (OUTPATIENT)
Dept: PHYSICAL THERAPY | Facility: REHABILITATION | Age: 79
End: 2019-08-19
Attending: INTERNAL MEDICINE
Payer: MEDICARE

## 2019-08-22 ENCOUNTER — PATIENT MESSAGE (OUTPATIENT)
Dept: MEDICAL GROUP | Facility: PHYSICIAN GROUP | Age: 79
End: 2019-08-22

## 2019-08-22 DIAGNOSIS — F40.243 FEAR OF FLYING: ICD-10-CM

## 2019-08-22 RX ORDER — DIAZEPAM 2 MG/1
2 TABLET ORAL
Qty: 6 TAB | Refills: 0 | Status: SHIPPED | OUTPATIENT
Start: 2019-08-22 | End: 2019-08-28

## 2019-08-23 ENCOUNTER — APPOINTMENT (OUTPATIENT)
Dept: PHYSICAL THERAPY | Facility: REHABILITATION | Age: 79
End: 2019-08-23
Attending: INTERNAL MEDICINE
Payer: MEDICARE

## 2019-08-26 ENCOUNTER — APPOINTMENT (OUTPATIENT)
Dept: PHYSICAL THERAPY | Facility: REHABILITATION | Age: 79
End: 2019-08-26
Attending: INTERNAL MEDICINE
Payer: MEDICARE

## 2019-08-28 ENCOUNTER — APPOINTMENT (OUTPATIENT)
Dept: PHYSICAL THERAPY | Facility: REHABILITATION | Age: 79
End: 2019-08-28
Attending: INTERNAL MEDICINE
Payer: MEDICARE

## 2019-10-29 ENCOUNTER — TELEPHONE (OUTPATIENT)
Dept: MEDICAL GROUP | Facility: PHYSICIAN GROUP | Age: 79
End: 2019-10-29

## 2019-10-29 NOTE — TELEPHONE ENCOUNTER
Future Appointments       Provider Department Center    11/1/2019 11:30 AM [address] M.D. [facility]        ESTABLISHED PATIENT PRE-VISIT PLANNING     Patient was NOT contacted to complete PVP.       1.  Reviewed notes from the last few office visits within the medical group: Yes    2.  If any orders were placed at last visit or intended to be done for this visit (i.e. 6 mos follow-up), do we have Results/Consult Notes?        •  Labs - Labs ordered, completed on 01/15/2019 and results are in chart.       •  Imaging - Imaging ordered, completed and results are in chart.       •  Referrals - Referral ordered, patient was seen and consult notes are in chart. Care Teams updated  YES.    3. Is this appointment scheduled as a Hospital Follow-Up? No    4.  Immunizations were updated in TPACK using WebIZ?: Yes       •  Web Iz Recommendations: FLU, TD, VARICELLA (Chicken Pox)  and SHINGRIX (Shingles)    5.  Patient is due for the following Health Maintenance Topics:   Health Maintenance Due   Topic Date Due   • Annual Wellness Visit  04/06/2019   • IMM INFLUENZA (1) 09/01/2019     6. Orders for overdue Health Maintenance topics pended in Pre-Charting? NO    7.  AHA (MDX) form printed for Provider? No, already completed    8.  Patient was NOT informed to arrive 15 min prior to their scheduled appointment and bring in their medication bottles.

## 2019-11-01 ENCOUNTER — OFFICE VISIT (OUTPATIENT)
Dept: MEDICAL GROUP | Facility: PHYSICIAN GROUP | Age: 79
End: 2019-11-01
Payer: MEDICARE

## 2019-11-01 VITALS
HEIGHT: 64 IN | WEIGHT: 209.8 LBS | SYSTOLIC BLOOD PRESSURE: 154 MMHG | RESPIRATION RATE: 14 BRPM | TEMPERATURE: 97.7 F | HEART RATE: 79 BPM | DIASTOLIC BLOOD PRESSURE: 82 MMHG | OXYGEN SATURATION: 93 % | BODY MASS INDEX: 35.82 KG/M2

## 2019-11-01 DIAGNOSIS — R41.3 MEMORY LOSS: ICD-10-CM

## 2019-11-01 DIAGNOSIS — E78.5 DYSLIPIDEMIA: ICD-10-CM

## 2019-11-01 DIAGNOSIS — E55.9 VITAMIN D DEFICIENCY: ICD-10-CM

## 2019-11-01 DIAGNOSIS — E03.9 ACQUIRED HYPOTHYROIDISM: ICD-10-CM

## 2019-11-01 DIAGNOSIS — R53.83 OTHER FATIGUE: ICD-10-CM

## 2019-11-01 DIAGNOSIS — I10 ESSENTIAL HYPERTENSION: ICD-10-CM

## 2019-11-01 DIAGNOSIS — Z02.9 ADMINISTRATIVE ENCOUNTER: ICD-10-CM

## 2019-11-01 DIAGNOSIS — Z51.81 MEDICATION MONITORING ENCOUNTER: ICD-10-CM

## 2019-11-01 DIAGNOSIS — R73.9 HYPERGLYCEMIA: ICD-10-CM

## 2019-11-01 PROBLEM — E66.9 OBESITY (BMI 35.0-39.9 WITHOUT COMORBIDITY): Status: RESOLVED | Noted: 2018-09-20 | Resolved: 2019-11-01

## 2019-11-01 PROBLEM — E66.9 OBESITY (BMI 30-39.9): Status: RESOLVED | Noted: 2018-04-05 | Resolved: 2019-11-01

## 2019-11-01 PROCEDURE — 99214 OFFICE O/P EST MOD 30 MIN: CPT | Performed by: FAMILY MEDICINE

## 2019-11-01 RX ORDER — TOLTERODINE 4 MG/1
4 CAPSULE, EXTENDED RELEASE ORAL
Refills: 1 | COMMUNITY
Start: 2019-08-25 | End: 2019-11-01

## 2019-11-01 RX ORDER — LISINOPRIL 20 MG/1
20 TABLET ORAL
Qty: 100 TAB | Refills: 0 | Status: SHIPPED | OUTPATIENT
Start: 2019-11-01 | End: 2020-02-05

## 2019-11-01 SDOH — HEALTH STABILITY: MENTAL HEALTH: HOW OFTEN DO YOU HAVE 6 OR MORE DRINKS ON ONE OCCASION?: NEVER

## 2019-11-01 SDOH — HEALTH STABILITY: MENTAL HEALTH: HOW MANY STANDARD DRINKS CONTAINING ALCOHOL DO YOU HAVE ON A TYPICAL DAY?: NOT ASKED

## 2019-11-01 SDOH — HEALTH STABILITY: MENTAL HEALTH: HOW OFTEN DO YOU HAVE A DRINK CONTAINING ALCOHOL?: NEVER

## 2019-11-01 NOTE — PATIENT INSTRUCTIONS
Diagnoses and all orders for this visit:    Essential hypertension  -     lisinopril (PRINIVIL) 20 MG Tab; Take 1 Tab by mouth every day.    Memory loss    BMI 35.0-35.9,adult  -     Patient identified as having weight management issue.  Appropriate orders and counseling given.    Administrative encounter    Vitamin D deficiency  -     VITAMIN D,25 HYDROXY; Future    Dyslipidemia  -     Lipid Profile; Future    Acquired hypothyroidism    Medication monitoring encounter  -     CBC WITH DIFFERENTIAL; Future  -     Comp Metabolic Panel; Future    Other fatigue  -     CBC WITH DIFFERENTIAL; Future  -     TSH WITH REFLEX TO FT4; Future    Hyperglycemia  -     HEMOGLOBIN A1C; Future    -At least 1 week before you see me please get fasting lab work 8 hours of no eating but drink plenty of water.  Did her Mini-Mental today which was 25/30 and she is doing fairly well with the memory and she does not drive long distances nor at night and DMV paperwork signed and filled out that is safe for her to drive.  She will also check her blood pressure at home at least 3 times a week sometimes in the morning sometimes in the evening and please record the top number systolic and the bottom number diastolic and your heart rate in both arms and please keep a record of this and if her blood pressure stays above 130s over 90s we will consider increasing or adjusting her blood pressure medications.  And she will see me back in about 5 weeks to go over her labs and see how her blood pressure is doing to adjust it as needed.  ER precautions given if any chest pain, shortness of breath, passing out then please go to the ER call 911 otherwise we will see her back to go over her labs and blood pressure.    Return in about 5 weeks (around 12/6/2019), or BP/lab FU/memory.    Introduction  Your blood pressure on this visit to the emergency department or clinic is elevated. This does not necessarily mean you have high blood pressure (hypertension),  but it does mean that your blood pressure needs to be rechecked. Many times your blood pressure can increase due to illness, pain, anxiety, or other factors.  We recommend that you get a series of blood pressure readings done over a period of 5 days. It is best to get a reading in the morning and one in the evening. You should make sure to sit and relax for 1-5 minutes before the reading is taken. Write the readings down and make a follow-up appointment with your health care provider to discuss the results. If there is not a free clinic or a drug store with a blood-pressure-taking machine near you, you can purchase blood-pressure-taking equipment from a drug store. Having one in the home allows you the convenience of taking your blood pressure while you are home and relaxed.  BLOOD PRESSURE LOG   Date: _______________________  · a.m. _____________________  · p.m. _____________________  Date: _______________________  · a.m. _____________________  · p.m. _____________________  Date: _______________________  · a.m. _____________________  · p.m. _____________________  Date: _______________________  · a.m. _____________________  · p.m. _____________________  Date: _______________________  · a.m. _____________________  · p.m. _____________________  This information is not intended to replace advice given to you by your health care provider. Make sure you discuss any questions you have with your health care provider.      How to Take Your Blood Pressure  Blood pressure is a measurement of how strongly your blood is pressing against the walls of your arteries. Arteries are blood vessels that carry blood from your heart throughout your body. Your health care provider takes your blood pressure at each office visit. You can also take your own blood pressure at home with a blood pressure machine. You may need to take your own blood pressure:  · To confirm a diagnosis of high blood pressure (hypertension).  · To monitor your  blood pressure over time.  · To make sure your blood pressure medicine is working.  Supplies needed:  To take your blood pressure, you will need a blood pressure machine. You can buy a blood pressure machine, or blood pressure monitor, at most [employer] or online. There are several types of home blood pressure monitors. When choosing one, consider the following:  · Choose a monitor that has an arm cuff.  · Choose a monitor that wraps snugly around your upper arm. You should be able to fit only one finger between your arm and the cuff.  · Do not choose a monitor that measures your blood pressure from your wrist or finger.  Your health care provider can suggest a reliable monitor that will meet your needs.  How to prepare  To get the most accurate reading, avoid the following for 30 minutes before you check your blood pressure:  · Drinking caffeine.  · Drinking alcohol.  · Eating.  · Smoking.  · Exercising.  Five minutes before you check your blood pressure:  · Empty your bladder.  · Sit quietly without talking in a dining chair, rather than in a soft couch or armchair.  How to take your blood pressure  To check your blood pressure, follow the instructions in the manual that came with your blood pressure monitor. If you have a digital blood pressure monitor, the instructions may be as follows:  1. Sit up straight.  2. Place your feet on the floor. Do not cross your ankles or legs.  3. Rest your left arm at the level of your heart on a table or desk or on the arm of a chair.  4. Pull up your shirt sleeve.  5. Wrap the blood pressure cuff around the upper part of your left arm, 1 inch (2.5 cm) above your elbow. It is best to wrap the cuff around bare skin.  6. Fit the cuff snugly around your arm. You should be able to place only one finger between the cuff and your arm.  7. Position the cord inside the groove of your elbow.  8. Press the power button.  9. Sit quietly while the cuff inflates and deflates.  10. Read the  "digital reading on the monitor screen and write it down (record it).  11. Wait 2-3 minutes, then repeat the steps starting at step 1.  What does my blood pressure reading mean?  A blood pressure reading is recorded as two numbers, such as \"120 over 80\" (or 120/80). The first (\"top\") number is called the systolic pressure. It is a measure of the pressure in your arteries as the heart beats. The second (\"bottom\") number is called the diastolic pressure. It is a measure of the pressure in your arteries as the heart relaxes between beats.  Blood pressure is classified into four stages. The following are the stages for adults who do not have a short-term serious illness or a chronic condition. Systolic pressure and diastolic pressure are measured in a unit called mm Hg.  Normal  · Systolic pressure: below 120.  · Diastolic pressure: below 80.  Prehypertension  · Systolic pressure: 120-139.  · Diastolic pressure: 80-89.  Hypertension stage 1  · Systolic pressure: 140-159.  · Diastolic pressure: 90-99.  Hypertension stage 2  · Systolic pressure: 160 or above.  · Diastolic pressure: 100 or above.  You can have prehypertension or hypertension even if only the systolic or only the diastolic number in your reading is higher than normal.  Follow these instructions at home:  · Check your blood pressure as often as recommended by your health care provider.  · Take your monitor to the next appointment with your health care provider to make sure:  ¨ That you are using it correctly.  ¨ That it provides accurate readings.  · Be sure you understand what your goal blood pressure numbers are.  · Tell your health care provider if you are having any side effects from blood pressure medicine.  Contact a health care provider if:  · Your blood pressure is consistently high.  Get help right away if:  · Your systolic blood pressure is higher than 180.  · Your diastolic blood pressure is higher than 110.  This information is not intended to " replace advice given to you by your health care provider. Make sure you discuss any questions you have with your health care provider.  Document Released: 05/26/2017 Document Revised: 08/08/2017 Document Reviewed: 05/26/2017  [employer] Interactive Patient Education © 2017 [employer]

## 2019-11-01 NOTE — PROGRESS NOTES
cc: Establish care, hypertension, hypothyroidism, memory concerns, administrative DMV license    Subjective:     Katharine Gee is a 78 y.o. 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  She lives with her 2 daughters Yolande and Lesia and son-in-law.  She was  2 years ago.  She is quite independent at home.  She does have slight decline in her memory and November 1, 2019 her Mini-Mental status was 25/30.  No hospitalizations this year.  No social or domestic concerns.  She is not having any car accidents.  She has seen the optometrist and her vision is doing well.  And she does not drive far or at night.  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    Review of systems:     Constitutional: Negative for fever, chills and negative fatigue.   HENT: Negative for sinus pressure  Eyes: Negative for blurriness  Respiratory: Negative for cough and shortness of breath, negative for exertional shortness of breath  Cardiovascular: Negative for leg swelling, negative for palpitations, negative for chest pain  Gastrointestinal: Negative for nausea, vomiting, abdominal pain, constipation and diarrhea.  Genitourinary: Negative for dysuria and hematuria.   Skin: Negative for rash.   Neurological: Negative for dizziness, focal weakness and headaches.   Endo/Heme/Allergies: Denies bleeding, bruising, and recurrent allergies.  Psychiatric/Behavioral: Negative for depression and anxiety.      Current Outpatient Medications:   •  lisinopril (PRINIVIL) 20 MG Tab, Take 1 Tab by mouth every day., Disp: 100 Tab, Rfl: 0  •  amLODIPine (NORVASC) 5 MG Tab, Take 1 Tab by mouth every day., Disp: 100 Tab, Rfl: 1  •  levothyroxine (SYNTHROID) 25 MCG Tab, TAKE 1 TAB BY MOUTH EVERY MORNING ON AN EMPTY STOMACH., Disp: 90 Tab, Rfl: 3  •  tolterodine ER (DETROL-LA) 2 MG CAPSULE SR 24 HR, Take 1 Cap by mouth every day., Disp: 90 Cap,  "Rfl: 1  •  aspirin 81 MG tablet, Take 81 mg by mouth every day., Disp: , Rfl:     Allergies, past medical history, past surgical history, family history, social history reviewed and updated    Objective:     Vitals: /82 (BP Location: Right arm, Patient Position: Sitting, BP Cuff Size: Adult)   Pulse 79   Temp 36.5 °C (97.7 °F) (Temporal)   Resp 14   Ht 1.632 m (5' 4.25\")   Wt 95.2 kg (209 lb 12.8 oz)   SpO2 93%   BMI 35.73 kg/m²   General: Alert, pleasant, NAD  HEENT: Normocephalic.  Nontraumatic. EOMI, no icterus or pallor.  Conjunctivae and lids normal. External ears normal.   Heart: Regular rate and rhythm.  S1 and S2 normal.  No murmurs appreciated.  Respiratory: Normal respiratory effort.  Clear to auscultation bilaterally.  Skin: Warm, dry, no rashes.  Musculoskeletal: Gait is normal.  Moves all extremities well.  Extremities: No leg edema.    Psych:  Affect/mood is normal, judgement is good, memory is intact, grooming is appropriate.    Assessment/Plan:     Diagnoses and all orders for this visit:    Essential hypertension  -     lisinopril (PRINIVIL) 20 MG Tab; Take 1 Tab by mouth every day.    Memory loss    BMI 35.0-35.9,adult  -     Patient identified as having weight management issue.  Appropriate orders and counseling given.    Administrative encounter    Vitamin D deficiency  -     VITAMIN D,25 HYDROXY; Future    Dyslipidemia  -     Lipid Profile; Future    Acquired hypothyroidism    Medication monitoring encounter  -     CBC WITH DIFFERENTIAL; Future  -     Comp Metabolic Panel; Future    Other fatigue  -     CBC WITH DIFFERENTIAL; Future  -     TSH WITH REFLEX TO FT4; Future    Hyperglycemia  -     HEMOGLOBIN A1C; Future    -At least 1 week before you see me please get fasting lab work 8 hours of no eating but drink plenty of water.  Did her Mini-Mental today which was 25/30 and she is doing fairly well with the memory and she does not drive long distances nor at night and DMV paperwork " signed and filled out that is safe for her to drive.  She will also check her blood pressure at home at least 3 times a week sometimes in the morning sometimes in the evening and please record the top number systolic and the bottom number diastolic and your heart rate in both arms and please keep a record of this and if her blood pressure stays above 130s over 90s we will consider increasing or adjusting her blood pressure medications.  And she will see me back in about 5 weeks to go over her labs and see how her blood pressure is doing to adjust it as needed.  ER precautions given if any chest pain, shortness of breath, passing out then please go to the ER call 911 otherwise we will see her back to go over her labs and blood pressure.    Return in about 5 weeks (around 12/6/2019), or BP/lab FU/memory.

## 2019-12-09 ENCOUNTER — PATIENT OUTREACH (OUTPATIENT)
Dept: HEALTH INFORMATION MANAGEMENT | Facility: OTHER | Age: 79
End: 2019-12-09

## 2019-12-09 NOTE — PROGRESS NOTES
Outcome: Left Message    Please transfer to Patient Outreach Team at [phone] when patient returns call.    [facility] Verified: yes    Attempt # 1

## 2020-01-08 ENCOUNTER — OFFICE VISIT (OUTPATIENT)
Dept: MEDICAL GROUP | Facility: PHYSICIAN GROUP | Age: 80
End: 2020-01-08
Payer: MEDICARE

## 2020-01-08 VITALS
HEART RATE: 76 BPM | OXYGEN SATURATION: 93 % | BODY MASS INDEX: 36.93 KG/M2 | TEMPERATURE: 97.5 F | RESPIRATION RATE: 12 BRPM | HEIGHT: 64 IN | WEIGHT: 216.3 LBS | SYSTOLIC BLOOD PRESSURE: 146 MMHG | DIASTOLIC BLOOD PRESSURE: 70 MMHG

## 2020-01-08 DIAGNOSIS — I10 ESSENTIAL HYPERTENSION: ICD-10-CM

## 2020-01-08 DIAGNOSIS — E03.9 ACQUIRED HYPOTHYROIDISM: ICD-10-CM

## 2020-01-08 DIAGNOSIS — N32.81 OVERACTIVE BLADDER: ICD-10-CM

## 2020-01-08 DIAGNOSIS — R41.3 MEMORY LOSS: ICD-10-CM

## 2020-01-08 PROBLEM — M25.512 LEFT SHOULDER PAIN: Status: RESOLVED | Noted: 2019-07-18 | Resolved: 2020-01-08

## 2020-01-08 PROCEDURE — 99214 OFFICE O/P EST MOD 30 MIN: CPT | Performed by: FAMILY MEDICINE

## 2020-01-08 ASSESSMENT — PATIENT HEALTH QUESTIONNAIRE - PHQ9: CLINICAL INTERPRETATION OF PHQ2 SCORE: 0

## 2020-01-08 NOTE — PATIENT INSTRUCTIONS
Diagnoses and all orders for this visit:    Essential hypertension    Acquired hypothyroidism    BMI 37.0-37.9, adult    Memory loss    Overactive bladder    -She is here with her daughter Yolande she lives with and Mini-Mental status exam was 27 out of 30 and DMV paperwork Syed filled out and she does not drive long distances or in the dark and she knows not to.  She did forget to do her blood pressure logs and her lab work so she will 1 week before she sees me in about 4 weeks do her fasting lab work 8 hours of no eating but drink plenty of water.  Also please bring all your medication bottles that you are taking even from your specialist so we know who is prescribing what as there was some confusion of where she is getting the Detrol LA 2 mg from.  She does continue to see the urologist and she will call her general surgeon up as she is having issues with her abdominal wall hernia so they will give her surgeon a call.  She will also bring her blood pressure logs with her please check your blood pressures about 2-3 times a week sometimes in the morning sometimes in the evening the top number systolic and the bottom number diastolic and your heart rate and please bring this at your next appointment and please bring the blood pressure cuff kit at your appointment so we could compare it to the one here that I will recheck myself and we would like your blood pressure to be below 130s over 90s but if it is going too high or too low and depending on your kidney function and lab work we may do some medication adjustments otherwise she is doing fairly well and her memory is doing fairly well and she will follow-up with the surgeon for her abdominal wall hernia.    Return in about 4 weeks (around 2/5/2020), or lab FU/BLood pressure.    How to Take Your Blood Pressure  Blood pressure is a measurement of how strongly your blood is pressing against the walls of your arteries. Arteries are blood vessels that carry blood from  your heart throughout your body. Your health care provider takes your blood pressure at each office visit. You can also take your own blood pressure at home with a blood pressure machine. You may need to take your own blood pressure:  · To confirm a diagnosis of high blood pressure (hypertension).  · To monitor your blood pressure over time.  · To make sure your blood pressure medicine is working.  Supplies needed:  To take your blood pressure, you will need a blood pressure machine. You can buy a blood pressure machine, or blood pressure monitor, at most [employer] or online. There are several types of home blood pressure monitors. When choosing one, consider the following:  · Choose a monitor that has an arm cuff.  · Choose a monitor that wraps snugly around your upper arm. You should be able to fit only one finger between your arm and the cuff.  · Do not choose a monitor that measures your blood pressure from your wrist or finger.  Your health care provider can suggest a reliable monitor that will meet your needs.  How to prepare  To get the most accurate reading, avoid the following for 30 minutes before you check your blood pressure:  · Drinking caffeine.  · Drinking alcohol.  · Eating.  · Smoking.  · Exercising.  Five minutes before you check your blood pressure:  · Empty your bladder.  · Sit quietly without talking in a dining chair, rather than in a soft couch or armchair.  How to take your blood pressure  To check your blood pressure, follow the instructions in the manual that came with your blood pressure monitor. If you have a digital blood pressure monitor, the instructions may be as follows:  1. Sit up straight.  2. Place your feet on the floor. Do not cross your ankles or legs.  3. Rest your left arm at the level of your heart on a table or desk or on the arm of a chair.  4. Pull up your shirt sleeve.  5. Wrap the blood pressure cuff around the upper part of your left arm, 1 inch (2.5 cm) above your  "elbow. It is best to wrap the cuff around bare skin.  6. Fit the cuff snugly around your arm. You should be able to place only one finger between the cuff and your arm.  7. Position the cord inside the groove of your elbow.  8. Press the power button.  9. Sit quietly while the cuff inflates and deflates.  10. Read the digital reading on the monitor screen and write it down (record it).  11. Wait 2-3 minutes, then repeat the steps starting at step 1.  What does my blood pressure reading mean?  A blood pressure reading is recorded as two numbers, such as \"120 over 80\" (or 120/80). The first (\"top\") number is called the systolic pressure. It is a measure of the pressure in your arteries as the heart beats. The second (\"bottom\") number is called the diastolic pressure. It is a measure of the pressure in your arteries as the heart relaxes between beats.  Blood pressure is classified into four stages. The following are the stages for adults who do not have a short-term serious illness or a chronic condition. Systolic pressure and diastolic pressure are measured in a unit called mm Hg.  Normal  · Systolic pressure: below 120.  · Diastolic pressure: below 80.  Prehypertension  · Systolic pressure: 120-139.  · Diastolic pressure: 80-89.  Hypertension stage 1  · Systolic pressure: 140-159.  · Diastolic pressure: 90-99.  Hypertension stage 2  · Systolic pressure: 160 or above.  · Diastolic pressure: 100 or above.  You can have prehypertension or hypertension even if only the systolic or only the diastolic number in your reading is higher than normal.  Follow these instructions at home:  · Check your blood pressure as often as recommended by your health care provider.  · Take your monitor to the next appointment with your health care provider to make sure:  ¨ That you are using it correctly.  ¨ That it provides accurate readings.  · Be sure you understand what your goal blood pressure numbers are.  · Tell your health care " provider if you are having any side effects from blood pressure medicine.  Contact a health care provider if:  · Your blood pressure is consistently high.  Get help right away if:  · Your systolic blood pressure is higher than 180.  · Your diastolic blood pressure is higher than 110.  This information is not intended to replace advice given to you by your health care provider. Make sure you discuss any questions you have with your health care provider.  Document Released: 05/26/2017 Document Revised: 08/08/2017 Document Reviewed: 05/26/2017  [employer] Interactive Patient Education © 2017 [employer]  Introduction  Your blood pressure on this visit to the emergency department or clinic is elevated. This does not necessarily mean you have high blood pressure (hypertension), but it does mean that your blood pressure needs to be rechecked. Many times your blood pressure can increase due to illness, pain, anxiety, or other factors.  We recommend that you get a series of blood pressure readings done over a period of 5 days. It is best to get a reading in the morning and one in the evening. You should make sure to sit and relax for 1-5 minutes before the reading is taken. Write the readings down and make a follow-up appointment with your health care provider to discuss the results. If there is not a free clinic or a drug store with a blood-pressure-taking machine near you, you can purchase blood-pressure-taking equipment from a drug store. Having one in the home allows you the convenience of taking your blood pressure while you are home and relaxed.  BLOOD PRESSURE LOG   Date: _______________________  · a.m. _____________________  · p.m. _____________________  Date: _______________________  · a.m. _____________________  · p.m. _____________________  Date: _______________________  · a.m. _____________________  · p.m. _____________________  Date: _______________________  · a.m. _____________________  · p.m.  _____________________  Date: _______________________  · a.m. _____________________  · p.m. _____________________  This information is not intended to replace advice given to you by your health care provider. Make sure you discuss any questions you have with your health care provider.  Document Released: 09/15/2004 Document Revised: 07/07/2017 Document Reviewed: 02/10/2015  © 2017 [employer]

## 2020-01-08 NOTE — PROGRESS NOTES
cc: Hypertension, hypothyroidism, BMI 37, memory improved, bladder improved on Detrol LA    Subjective:     Katharine Gee is a 79 y.o. female presenting she is here today with her daughter who she lives with the now Yolande she did forget to bring her blood pressure logs and take them and she did forget to do her lab work but we did do her many mental status exam and her score is 27 out of 30.  She does not drive long distances and she is aware not to drive any long distances and she is not having any driving issues right now.  Her abdominal wall hernia is bothering her and so she will and her daughter will call the surgeon for that who has taken care of that in the past she still also seeing her urologist who has prescribed her Detrol LA 2 mg daily.  The Detrol LA has been helping.  She does not drive far or in the dark and we did fill out her DMV paperwork.  Sure how her blood pressure is doing at home as she has not been checking but she will start checking    Review of systems:     Constitutional: Negative for fever, chills and negative fatigue.   HENT: Negative for sinus pressure  Eyes: Negative for blurriness, negative for double vision  Respiratory: Negative for cough and shortness of breath, negative for exertional shortness of breath  Cardiovascular: Negative for leg swelling, negative for palpitations, negative for chest pain  Gastrointestinal: Negative for nausea, vomiting, abdominal pain, constipation and diarrhea.  Genitourinary: Negative for dysuria and hematuria.   Neurological: Negative for dizziness, focal weakness and headaches.   Endo/Heme/Allergies: Denies bleeding, bruising, and recurrent allergies.  Psychiatric/Behavioral: Negative for depression and anxiety.        Current Outpatient Medications:   •  lisinopril (PRINIVIL) 20 MG Tab, Take 1 Tab by mouth every day., Disp: 100 Tab, Rfl: 0  •  amLODIPine (NORVASC) 5 MG Tab, Take 1 Tab by mouth every day., Disp: 100 Tab, Rfl: 1  •   "levothyroxine (SYNTHROID) 25 MCG Tab, TAKE 1 TAB BY MOUTH EVERY MORNING ON AN EMPTY STOMACH., Disp: 90 Tab, Rfl: 3  •  tolterodine ER (DETROL-LA) 2 MG CAPSULE SR 24 HR, Take 1 Cap by mouth every day., Disp: 90 Cap, Rfl: 1  •  aspirin 81 MG tablet, Take 81 mg by mouth every day., Disp: , Rfl:     Allergies, past medical history, past surgical history, family history, social history reviewed and updated    Objective:     Vitals: /70 (BP Location: Left arm, Patient Position: Sitting, BP Cuff Size: Adult)   Pulse 76   Temp 36.4 °C (97.5 °F) (Temporal)   Resp 12   Ht 1.626 m (5' 4\")   Wt 98.1 kg (216 lb 4.8 oz)   SpO2 93%   BMI 37.13 kg/m²   General: Alert, pleasant, NAD  HEENT: Normocephalic.  Nontraumatic. EOMI, no icterus or pallor.  Conjunctivae and lids normal. External ears normal.   Heart: Regular rate and rhythm.  S1 and S2 normal.  No murmurs appreciated.  Respiratory: Normal respiratory effort.  Clear to auscultation bilaterally.  Skin: Warm, dry, no rashes.  Musculoskeletal: Gait is normal.  Moves all extremities well.  Extremities: No leg edema.   Psych:  Affect/mood is normal, judgement is good, memory is intact, grooming is appropriate.    Assessment/Plan:     Diagnoses and all orders for this visit:    Essential hypertension    Acquired hypothyroidism    BMI 37.0-37.9, adult    Memory loss    Overactive bladder    -She is here with her daughter Yolande she lives with and Mini-Mental status exam was 27 out of 30 and DMV paperwork Syed filled out and she does not drive long distances or in the dark and she knows not to.  She did forget to do her blood pressure logs and her lab work so she will 1 week before she sees me in about 4 weeks do her fasting lab work 8 hours of no eating but drink plenty of water.  Also please bring all your medication bottles that you are taking even from your specialist so we know who is prescribing what as there was some confusion of where she is getting the Detrol " LA 2 mg from.  She does continue to see the urologist and she will call her general surgeon up as she is having issues with her abdominal wall hernia so they will give her surgeon a call.  She will also bring her blood pressure logs with her please check your blood pressures about 2-3 times a week sometimes in the morning sometimes in the evening the top number systolic and the bottom number diastolic and your heart rate and please bring this at your next appointment and please bring the blood pressure cuff kit at your appointment so we could compare it to the one here that I will recheck myself and we would like your blood pressure to be below 130s over 90s but if it is going too high or too low and depending on your kidney function and lab work we may do some medication adjustments otherwise she is doing fairly well and her memory is doing fairly well and she will follow-up with the surgeon for her abdominal wall hernia.    Return in about 4 weeks (around 2/5/2020), or lab FU/BLood pressure.    Annual Health Assessment Questions:    1.  Are you currently engaging in any exercise or physical activity? No    2.  How would you describe your mood or emotional well-being today? good    3.  Have you had any falls in the last year? No    4.  Have you noticed any problems with your balance or had difficulty walking? Yes    5.  In the last six months have you experienced any leakage of urine? Yes    6. DPA/Advanced Directive: Patient has Advanced Directive, Living Will and Durable Power of  on file.

## 2020-01-29 ENCOUNTER — HOSPITAL ENCOUNTER (OUTPATIENT)
Dept: LAB | Facility: MEDICAL CENTER | Age: 80
End: 2020-01-29
Attending: FAMILY MEDICINE
Payer: MEDICARE

## 2020-01-29 DIAGNOSIS — R53.83 OTHER FATIGUE: ICD-10-CM

## 2020-01-29 DIAGNOSIS — E55.9 VITAMIN D DEFICIENCY: ICD-10-CM

## 2020-01-29 DIAGNOSIS — E78.5 DYSLIPIDEMIA: ICD-10-CM

## 2020-01-29 DIAGNOSIS — R73.9 HYPERGLYCEMIA: ICD-10-CM

## 2020-01-29 DIAGNOSIS — Z51.81 MEDICATION MONITORING ENCOUNTER: ICD-10-CM

## 2020-01-29 LAB
ALBUMIN SERPL BCP-MCNC: 3.8 G/DL (ref 3.2–4.9)
ALBUMIN/GLOB SERPL: 1 G/DL
ALP SERPL-CCNC: 74 U/L (ref 30–99)
ALT SERPL-CCNC: 14 U/L (ref 2–50)
ANION GAP SERPL CALC-SCNC: 10 MMOL/L (ref 0–11.9)
AST SERPL-CCNC: 19 U/L (ref 12–45)
BASOPHILS # BLD AUTO: 0.4 % (ref 0–1.8)
BASOPHILS # BLD: 0.04 K/UL (ref 0–0.12)
BILIRUB SERPL-MCNC: 0.5 MG/DL (ref 0.1–1.5)
BUN SERPL-MCNC: 27 MG/DL (ref 8–22)
CALCIUM SERPL-MCNC: 10.2 MG/DL (ref 8.5–10.5)
CHLORIDE SERPL-SCNC: 105 MMOL/L (ref 96–112)
CHOLEST SERPL-MCNC: 214 MG/DL (ref 100–199)
CO2 SERPL-SCNC: 22 MMOL/L (ref 20–33)
CREAT SERPL-MCNC: 0.96 MG/DL (ref 0.5–1.4)
EOSINOPHIL # BLD AUTO: 0.07 K/UL (ref 0–0.51)
EOSINOPHIL NFR BLD: 0.7 % (ref 0–6.9)
ERYTHROCYTE [DISTWIDTH] IN BLOOD BY AUTOMATED COUNT: 46.8 FL (ref 35.9–50)
GLOBULIN SER CALC-MCNC: 3.8 G/DL (ref 1.9–3.5)
GLUCOSE SERPL-MCNC: 78 MG/DL (ref 65–99)
HCT VFR BLD AUTO: 46.1 % (ref 37–47)
HDLC SERPL-MCNC: 47 MG/DL
HGB BLD-MCNC: 14.5 G/DL (ref 12–16)
IMM GRANULOCYTES # BLD AUTO: 0.05 K/UL (ref 0–0.11)
IMM GRANULOCYTES NFR BLD AUTO: 0.5 % (ref 0–0.9)
LDLC SERPL CALC-MCNC: 138 MG/DL
LYMPHOCYTES # BLD AUTO: 2.66 K/UL (ref 1–4.8)
LYMPHOCYTES NFR BLD: 26 % (ref 22–41)
MCH RBC QN AUTO: 28.5 PG (ref 27–33)
MCHC RBC AUTO-ENTMCNC: 31.5 G/DL (ref 33.6–35)
MCV RBC AUTO: 90.6 FL (ref 81.4–97.8)
MONOCYTES # BLD AUTO: 0.59 K/UL (ref 0–0.85)
MONOCYTES NFR BLD AUTO: 5.8 % (ref 0–13.4)
NEUTROPHILS # BLD AUTO: 6.84 K/UL (ref 2–7.15)
NEUTROPHILS NFR BLD: 66.6 % (ref 44–72)
NRBC # BLD AUTO: 0 K/UL
NRBC BLD-RTO: 0 /100 WBC
PLATELET # BLD AUTO: 296 K/UL (ref 164–446)
PMV BLD AUTO: 9.7 FL (ref 9–12.9)
POTASSIUM SERPL-SCNC: 4.2 MMOL/L (ref 3.6–5.5)
PROT SERPL-MCNC: 7.6 G/DL (ref 6–8.2)
RBC # BLD AUTO: 5.09 M/UL (ref 4.2–5.4)
SODIUM SERPL-SCNC: 137 MMOL/L (ref 135–145)
TRIGL SERPL-MCNC: 143 MG/DL (ref 0–149)
WBC # BLD AUTO: 10.3 K/UL (ref 4.8–10.8)

## 2020-01-29 PROCEDURE — 82306 VITAMIN D 25 HYDROXY: CPT

## 2020-01-29 PROCEDURE — 85025 COMPLETE CBC W/AUTO DIFF WBC: CPT

## 2020-01-29 PROCEDURE — 80053 COMPREHEN METABOLIC PANEL: CPT

## 2020-01-29 PROCEDURE — 36415 COLL VENOUS BLD VENIPUNCTURE: CPT

## 2020-01-29 PROCEDURE — 84443 ASSAY THYROID STIM HORMONE: CPT

## 2020-01-29 PROCEDURE — 80061 LIPID PANEL: CPT

## 2020-01-29 PROCEDURE — 83036 HEMOGLOBIN GLYCOSYLATED A1C: CPT

## 2020-01-30 ENCOUNTER — TELEPHONE (OUTPATIENT)
Dept: MEDICAL GROUP | Facility: PHYSICIAN GROUP | Age: 80
End: 2020-01-30

## 2020-01-30 LAB
25(OH)D3 SERPL-MCNC: 31 NG/ML (ref 30–100)
EST. AVERAGE GLUCOSE BLD GHB EST-MCNC: 131 MG/DL
HBA1C MFR BLD: 6.2 % (ref 0–5.6)
TSH SERPL DL<=0.005 MIU/L-ACNC: 2.42 UIU/ML (ref 0.38–5.33)

## 2020-01-30 NOTE — TELEPHONE ENCOUNTER
----- Message from Barbie St M.D. sent at 1/30/2020  8:59 AM PST -----  Please call patient and let patient know to keep your follow-up appointment 2/5 to go over your lab work so we could discuss this in person but nothing urgent and we will also discuss any other follow-up plans and management at this appointment. At this appointment please bring all your current medication bottles as well. Thanks!

## 2020-02-04 ENCOUNTER — TELEPHONE (OUTPATIENT)
Dept: MEDICAL GROUP | Facility: PHYSICIAN GROUP | Age: 80
End: 2020-02-04

## 2020-02-04 NOTE — TELEPHONE ENCOUNTER
Future Appointments       Provider Department Center    2/5/2020 10:10 AM Barbie St M.D. [facility]        ESTABLISHED PATIENT PRE-VISIT PLANNING     Patient was NOT contacted to complete PVP.       1.  Reviewed notes from the last few office visits within the medical group: Yes    2.  If any orders were placed at last visit or intended to be done for this visit (i.e. 6 mos follow-up), do we have Results/Consult Notes?        •  Labs - Labs ordered, completed on 01/29/2020 and results are in chart.       •  Imaging - Imaging was not ordered at last office visit.       •  Referrals - No referrals were ordered at last office visit.    3. Is this appointment scheduled as a Hospital Follow-Up? No    4.  Immunizations were updated in [employer] using Web[employer]?: Yes       •  Web Iz Recommendations: FLU, TD, VARICELLA (Chicken Pox)  and SHINGRIX (Shingles)    5.  Patient is due for the following Health Maintenance Topics:   Health Maintenance Due   Topic Date Due   • IMM ZOSTER VACCINES (1 of 2) [birth year]/08/1988   • Annual Wellness Visit  04/06/2019   • BONE DENSITY  12/18/2019     6. Orders for overdue Health Maintenance topics pended in Pre-Charting? NO    7.  AHA (MDX) form printed for Provider? YES    8.  Patient was NOT informed to arrive 15 min prior to their scheduled appointment and bring in their medication bottles.

## 2020-02-05 ENCOUNTER — OFFICE VISIT (OUTPATIENT)
Dept: MEDICAL GROUP | Facility: PHYSICIAN GROUP | Age: 80
End: 2020-02-05
Payer: MEDICARE

## 2020-02-05 VITALS
HEIGHT: 64 IN | OXYGEN SATURATION: 95 % | RESPIRATION RATE: 16 BRPM | HEART RATE: 83 BPM | TEMPERATURE: 97.1 F | DIASTOLIC BLOOD PRESSURE: 80 MMHG | BODY MASS INDEX: 37.28 KG/M2 | WEIGHT: 218.4 LBS | SYSTOLIC BLOOD PRESSURE: 130 MMHG

## 2020-02-05 DIAGNOSIS — R73.03 PREDIABETES: ICD-10-CM

## 2020-02-05 DIAGNOSIS — I10 ESSENTIAL HYPERTENSION: ICD-10-CM

## 2020-02-05 DIAGNOSIS — E03.9 ACQUIRED HYPOTHYROIDISM: ICD-10-CM

## 2020-02-05 DIAGNOSIS — E55.9 VITAMIN D DEFICIENCY: ICD-10-CM

## 2020-02-05 DIAGNOSIS — E78.49 OTHER HYPERLIPIDEMIA: ICD-10-CM

## 2020-02-05 DIAGNOSIS — N18.30 CKD (CHRONIC KIDNEY DISEASE) STAGE 3, GFR 30-59 ML/MIN: ICD-10-CM

## 2020-02-05 DIAGNOSIS — N32.81 OVERACTIVE BLADDER: ICD-10-CM

## 2020-02-05 DIAGNOSIS — K43.9 HERNIA OF ABDOMINAL WALL: ICD-10-CM

## 2020-02-05 PROBLEM — R41.3 MEMORY LOSS: Status: RESOLVED | Noted: 2017-10-04 | Resolved: 2020-02-05

## 2020-02-05 PROCEDURE — 8041 PR SCP AHA: Performed by: FAMILY MEDICINE

## 2020-02-05 PROCEDURE — 99214 OFFICE O/P EST MOD 30 MIN: CPT | Performed by: FAMILY MEDICINE

## 2020-02-05 RX ORDER — LEVOTHYROXINE SODIUM 0.03 MG/1
25 TABLET ORAL
Qty: 100 TAB | Refills: 1 | Status: SHIPPED | OUTPATIENT
Start: 2020-02-05 | End: 2020-04-14 | Stop reason: SDUPTHER

## 2020-02-05 RX ORDER — LISINOPRIL 30 MG/1
30 TABLET ORAL DAILY
Qty: 100 TAB | Refills: 0 | Status: SHIPPED | OUTPATIENT
Start: 2020-02-05 | End: 2020-04-14 | Stop reason: SDUPTHER

## 2020-02-05 RX ORDER — ATORVASTATIN CALCIUM 20 MG/1
20 TABLET, FILM COATED ORAL
Qty: 100 TAB | Refills: 1 | Status: SHIPPED | OUTPATIENT
Start: 2020-02-05 | End: 2020-08-18

## 2020-02-05 RX ORDER — CHOLECALCIFEROL (VITAMIN D3) 125 MCG
2000 TABLET ORAL DAILY
COMMUNITY
End: 2020-04-14 | Stop reason: CLARIF

## 2020-02-05 RX ORDER — TOLTERODINE 4 MG/1
4 CAPSULE, EXTENDED RELEASE ORAL DAILY
COMMUNITY
End: 2023-11-10 | Stop reason: SDUPTHER

## 2020-02-05 RX ORDER — AMLODIPINE BESYLATE 5 MG/1
5 TABLET ORAL
Qty: 100 TAB | Refills: 1 | Status: SHIPPED | OUTPATIENT
Start: 2020-02-05 | End: 2020-09-21

## 2020-02-05 NOTE — PATIENT INSTRUCTIONS
Diagnoses and all orders for this visit:    Hernia of abdominal wall  -     REFERRAL TO GENERAL SURGERY    Overactive bladder    Acquired hypothyroidism  -     levothyroxine (SYNTHROID) 25 MCG Tab; Take 1 Tab by mouth Every morning on an empty stomach.    Essential hypertension  -     atorvastatin (LIPITOR) 20 MG Tab; Take 1 Tab by mouth every bedtime.  -     lisinopril (PRINIVIL) 30 MG tablet; Take 1 Tab by mouth every day.  -     amLODIPine (NORVASC) 5 MG Tab; Take 1 Tab by mouth every day.    Vitamin D deficiency    CKD (chronic kidney disease) stage 3, GFR 30-59 ml/min ([facility])    Prediabetes  -     atorvastatin (LIPITOR) 20 MG Tab; Take 1 Tab by mouth every bedtime.    Other hyperlipidemia  -     atorvastatin (LIPITOR) 20 MG Tab; Take 1 Tab by mouth every bedtime.    -January 29 she did lab work and her CBC is within normal limits, CMP within normal limits but BUN increased mildly at 27, GFR 56 and previously it was 55 but creatinine within normal limits, A1c 6.2, TSH thyroid within normal limits, but panel increased with LDL at 138, total cholesterol 214 but HDL and triglycerides within normal limit, vitamin D 31. -Due to her heart disease risk factors she will start atorvastatin 20 mg daily and we will increase her lisinopril from 20 mg and now to 30 mg daily for blood pressure goal of less than 130s over 90s and continue amlodipine 5 mg daily for blood pressure and atorvastatin 20 mg for her cholesterol.  Her TSH is doing well so 6 months of levothyroxine 25 mcg refilled.  She has a urology appointment this Friday and will find out if she is going to continue the Detrol LA and get that refilled or adjusted by them.  She will take vitamin D 2000 units additional over-the-counter as a like vitamin D to be above 50.  We did talk about the prediabetes and how to reduce the carbohydrates and can actually control this and could recheck this in about 6 months if you want with your thyroid.  And also check your kidney  function then as she does have stage III kidney disease and we talked how to prevent progression by drinking of water, reducing salt, avoiding medications that could damage the kidney and just to monitor this and if it does get worse into stage IV then can consider seeing a nephrologist.  Otherwise she will see her urologist this Friday.  We will also send a referral to a general surgeon Dr. Tolbert as her ventral hernia is getting larger and more tender and please read patient instruction section on when not to wait and go to the ER if you are having a lot of abdominal pain or if it is changing color so please read the patient instruction section otherwise if you do not hear back from your general surgeon to make an appointment in a week then please call the referrals department to schedule that appointment.  Otherwise we want her blood pressure to be below 130s over 90s and we will see how she does with the lisinopril and her blood pressure could be elevated due to the fact that her umbilical hernia has been bothering her otherwise she will follow-up in about 2 months to establish care with a new provider to see how her blood pressure is doing. With a new doctor please provide your medication bottles and bring in your blood pressure logs about 2-3 times a week.    Return in about 2 months (around 4/5/2020), or establish care with new provider/blood pressure check.      Hernia, Adult  A hernia is the bulging of an organ or tissue through a weak spot in the muscles of the abdomen (abdominal wall). Hernias develop most often near the navel or groin.  There are many kinds of hernias. Common kinds include:  · Femoral hernia. This kind of hernia develops under the groin in the upper thigh area.  · Inguinal hernia. This kind of hernia develops in the groin or scrotum.  · Umbilical hernia. This kind of hernia develops near the navel.  · Hiatal hernia. This kind of hernia causes part of the stomach to be pushed up into the  chest.  · Incisional hernia. This kind of hernia bulges through a scar from an abdominal surgery.  What are the causes?  This condition may be caused by:  · Heavy lifting.  · Coughing over a long period of time.  · Straining to have a bowel movement.  · An incision made during an abdominal surgery.  · A birth defect (congenital defect).  · Excess weight or obesity.  · Smoking.  · Poor nutrition.  · Cystic fibrosis.  · Excess fluid in the abdomen.  · Undescended testicles.  What are the signs or symptoms?  Symptoms of a hernia include:  · A lump on the abdomen. This is the first sign of a hernia. The lump may become more obvious with standing, straining, or coughing. It may get bigger over time if it is not treated or if the condition causing it is not treated.  · Pain. A hernia is usually painless, but it may become painful over time if treatment is delayed. The pain is usually dull and may get worse with standing or lifting heavy objects.  Sometimes a hernia gets tightly squeezed in the weak spot (strangulated) or stuck there (incarcerated) and causes additional symptoms. These symptoms may include:  · Vomiting.  · Nausea.  · Constipation.  · Irritability.  How is this diagnosed?  A hernia may be diagnosed with:  · A physical exam. During the exam your health care provider may ask you to cough or to make a specific movement, because a hernia is usually more visible when you move.  · Imaging tests. These can include:  ¨ X-rays.  ¨ Ultrasound.  ¨ CT scan.  How is this treated?  A hernia that is small and painless may not need to be treated. A hernia that is large or painful may be treated with surgery. Inguinal hernias may be treated with surgery to prevent incarceration or strangulation. Strangulated hernias are always treated with surgery, because lack of blood to the trapped organ or tissue can cause it to die.  Surgery to treat a hernia involves pushing the bulge back into place and repairing the weak part of the  abdomen.  Follow these instructions at home:  · Avoid straining.  · Do not lift anything heavier than 10 lb (4.5 kg).  · Lift with your leg muscles, not your back muscles. This helps avoid strain.  · When coughing, try to cough gently.  · Prevent constipation. Constipation leads to straining with bowel movements, which can make a hernia worse or cause a hernia repair to break down. You can prevent constipation by:  ¨ Eating a high-fiber diet that includes plenty of fruits and vegetables.  ¨ Drinking enough fluids to keep your urine clear or pale yellow. Aim to drink 6-8 glasses of water per day.  ¨ Using a stool softener as directed by your health care provider.  · Lose weight, if you are overweight.  · Do not use any tobacco products, including cigarettes, chewing tobacco, or electronic cigarettes. If you need help quitting, ask your health care provider.  · Keep all follow-up visits as directed by your health care provider. This is important. Your health care provider may need to monitor your condition.  Contact a health care provider if:  · You have swelling, redness, and pain in the affected area.  · Your bowel habits change.  Get help right away if:  · You have a fever.  · You have abdominal pain that is getting worse.  · You feel nauseous or you vomit.  · You cannot push the hernia back in place by gently pressing on it while you are lying down.  · The hernia:  ¨ Changes in shape or size.  ¨ Is stuck outside the abdomen.  ¨ Becomes discolored.  ¨ Feels hard or tender.  This information is not intended to replace advice given to you by your health care provider. Make sure you discuss any questions you have with your health care provider.  Document Released: 12/18/2006 Document Revised: 05/17/2017 Document Reviewed: 10/28/2015  [employer] Interactive Patient Education © 2017 [employer]      Ventral Hernia  A ventral hernia is a bulge of tissue from inside the abdomen that pushes through a weak area of the  muscles that form the front wall of the abdomen. The tissues inside the abdomen are inside a sac (peritoneum). These tissues include the small intestine, large intestine, and the fatty tissue that covers the intestines (omentum). Sometimes, the bulge that forms a hernia contains intestines. Other hernias contain only fat. Ventral hernias do not go away without surgical treatment.  There are several types of ventral hernias. You may have:  · A hernia at an incision site from previous abdominal surgery (incisional hernia).  · A hernia just above the belly button (epigastric hernia), or at the belly button (umbilical hernia). These types of hernias can develop from heavy lifting or straining.  · A hernia that comes and goes (reducible hernia). It may be visible only when you lift or strain. This type of hernia can be pushed back into the abdomen (reduced).  · A hernia that traps abdominal tissue inside the hernia (incarcerated hernia). This type of hernia does not reduce.  · A hernia that cuts off blood flow to the tissues inside the hernia (strangulated hernia). The tissues can start to die if this happens. This is a very painful bulge that cannot be reduced. A strangulated hernia is a medical emergency.  What are the causes?  This condition is caused by abdominal tissue putting pressure on an area of weakness in the abdominal muscles.  What increases the risk?  The following factors may make you more likely to develop this condition:  · Being male.  · Being 60 or older.  · Being overweight or obese.  · Having had previous abdominal surgery, especially if there was an infection after surgery.  · Having had an injury to the abdominal wall.  · Having had several pregnancies.  · Having a buildup of fluid inside the abdomen (ascites).  What are the signs or symptoms?  The only symptom of a ventral hernia may be a painless bulge in the abdomen. A reducible hernia may be visible only when you strain, cough, or lift. Other  symptoms may include:  · Dull pain.  · A feeling of pressure.  Signs and symptoms of a strangulated hernia may include:  · Increasing pain.  · Nausea and vomiting.  · Pain when pressing on the hernia.  · The skin over the hernia turning red or purple.  · Constipation.  · Blood in the stool (feces).  How is this diagnosed?  This condition may be diagnosed based on:  · Your symptoms.  · Your medical history.  · A physical exam. You may be asked to cough or strain while standing. These actions increase the pressure inside your abdomen and force the hernia through the opening in your muscles. Your health care provider may try to reduce the hernia by pressing on it.  · Imaging studies, such as an ultrasound or CT scan.  How is this treated?  This condition is treated with surgery. If you have a strangulated hernia, surgery is done as soon as possible. If your hernia is small and not incarcerated, you may be asked to lose some weight before surgery.  Follow these instructions at home:  · Follow instructions from your health care provider about eating or drinking restrictions.  · If you are overweight, your health care provider may recommend that you increase your activity level and eat a healthier diet.  · Do not lift anything that is heavier than 10 lb (4.5 kg).  · Return to your normal activities as told by your health care provider. Ask your health care provider what activities are safe for you. You may need to avoid activities that increase pressure on your hernia.  · Take over-the-counter and prescription medicines only as told by your health care provider.  · Keep all follow-up visits as told by your health care provider. This is important.  Contact a health care provider if:  · Your hernia gets larger.  · Your hernia becomes painful.  Get help right away if:  · Your hernia becomes increasingly painful.  · You have pain along with any of the following:  ¨ Changes in skin color in the area of the  hernia.  ¨ Nausea.  ¨ Vomiting.  ¨ Fever.  Summary  · A ventral hernia is a bulge of tissue from inside the abdomen that pushes through a weak area of the muscles that form the front wall of the abdomen.  · This condition is treated with surgery, which may be urgent depending on your hernia.  · Do not lift anything that is heavier than 10 lb (4.5 kg), and follow activity instructions from your health care provider.  This information is not intended to replace advice given to you by your health care provider. Make sure you discuss any questions you have with your health care provider.  Document Released: 12/04/2013 Document Revised: 08/04/2017 Document Reviewed: 08/04/2017  [employer] Interactive Patient Education © 2017 [employer]    Fat and Cholesterol Restricted Diet  High levels of fat and cholesterol in your blood may lead to various health problems, such as diseases of the heart, blood vessels, gallbladder, liver, and pancreas. Fats are concentrated sources of energy that come in various forms. Certain types of fat, including saturated fat, may be harmful in excess. Cholesterol is a substance needed by your body in small amounts. Your body makes all the cholesterol it needs. Excess cholesterol comes from the food you eat.  When you have high levels of cholesterol and saturated fat in your blood, health problems can develop because the excess fat and cholesterol will gather along the walls of your blood vessels, causing them to narrow. Choosing the right foods will help you control your intake of fat and cholesterol. This will help keep the levels of these substances in your blood within normal limits and reduce your risk of disease.  WHAT IS MY PLAN?  Your health care provider recommends that you:  · Get no more than __________ % of the total calories in your daily diet from fat.  · Limit your intake of saturated fat to less than ______% of your total calories each day.  · Limit the amount of cholesterol in  "your diet to less than _________mg per day.  WHAT TYPES OF FAT SHOULD I CHOOSE?  · Choose healthy fats more often. Choose monounsaturated and polyunsaturated fats, such as olive and canola oil, flaxseeds, walnuts, almonds, and seeds.  · Eat more omega-3 fats. Good choices include salmon, mackerel, sardines, tuna, flaxseed oil, and ground flaxseeds. Aim to eat fish at least two times a week.  · Limit saturated fats. Saturated fats are primarily found in animal products, such as meats, butter, and cream. Plant sources of saturated fats include palm oil, palm kernel oil, and coconut oil.  · Avoid foods with partially hydrogenated oils in them. These contain trans fats. Examples of foods that contain trans fats are stick margarine, some tub margarines, cookies, crackers, and other baked goods.  WHAT GENERAL GUIDELINES DO I NEED TO FOLLOW?  These guidelines for healthy eating will help you control your intake of fat and cholesterol:  · Check food labels carefully to identify foods with trans fats or high amounts of saturated fat.  · Fill one half of your plate with vegetables and green salads.  · Fill one fourth of your plate with whole grains. Look for the word \"whole\" as the first word in the ingredient list.  · Fill one fourth of your plate with lean protein foods.  · Limit fruit to two servings a day. Choose fruit instead of juice.  · Eat more foods that contain soluble fiber. Examples of foods that contain this type of fiber are apples, broccoli, carrots, beans, peas, and barley. Aim to get 20-30 g of fiber per day.  · Eat more home-cooked food and less restaurant, buffet, and fast food.  · Limit or avoid alcohol.  · Limit foods high in starch and sugar.  · Limit fried foods.  · Cook foods using methods other than frying. Baking, boiling, grilling, and broiling are all great options.  · Lose weight if you are overweight. Losing just 5-10% of your initial body weight can help your overall health and prevent diseases " such as diabetes and heart disease.  WHAT FOODS CAN I EAT?  Grains  Whole grains, such as whole wheat or whole grain breads, crackers, cereals, and pasta. Unsweetened oatmeal, bulgur, barley, quinoa, or brown rice. Corn or whole wheat flour tortillas.  Vegetables  Fresh or frozen vegetables (raw, steamed, roasted, or grilled). Green salads.  Fruits  All fresh, canned (in natural juice), or frozen fruits.  Meat and Other Protein Products  Ground beef (85% or leaner), grass-fed beef, or beef trimmed of fat. Skinless chicken or turkey. Ground chicken or turkey. Pork trimmed of fat. All fish and seafood. Eggs. Dried beans, peas, or lentils. Unsalted nuts or seeds. Unsalted canned or dry beans.  Dairy  Low-fat dairy products, such as skim or 1% milk, 2% or reduced-fat cheeses, low-fat ricotta or cottage cheese, or plain low-fat yogurt.  Fats and Oils  Tub margarines without trans fats. Light or reduced-fat mayonnaise and salad dressings. Avocado. Olive, canola, sesame, or safflower oils. Natural peanut or almond butter (choose ones without added sugar and oil).  The items listed above may not be a complete list of recommended foods or beverages. Contact your dietitian for more options.  WHAT FOODS ARE NOT RECOMMENDED?  Grains  White bread. White pasta. White rice. Cornbread. Bagels, pastries, and croissants. Crackers that contain trans fat.  Vegetables  White potatoes. Corn. Creamed or fried vegetables. Vegetables in a cheese sauce.  Fruits  Dried fruits. Canned fruit in light or heavy syrup. Fruit juice.  Meat and Other Protein Products  Fatty cuts of meat. Ribs, chicken wings, [place], sausage, bologna, salami, chitterlings, fatback, hot dogs, bratwurst, and packaged luncheon meats. Liver and organ meats.  Dairy  Whole or 2% milk, cream, half-and-half, and cream cheese. Whole milk cheeses. Whole-fat or sweetened yogurt. Full-fat cheeses. Nondairy creamers and whipped toppings. Processed cheese, cheese spreads, or cheese  curds.  Sweets and Desserts  Corn syrup, sugars, honey, and molasses. Candy. Jam and jelly. Syrup. Sweetened cereals. Cookies, pies, cakes, donuts, muffins, and ice cream.  Fats and Oils  Butter, stick margarine, lard, shortening, ghee, or higginbotham fat. Coconut, palm kernel, or palm oils.  Beverages  Alcohol. Sweetened drinks (such as sodas, lemonade, and fruit drinks or punches).  The items listed above may not be a complete list of foods and beverages to avoid. Contact your dietitian for more information.     This information is not intended to replace advice given to you by your health care provider. Make sure you discuss any questions you have with your health care provider.     Document Released: 12/18/2006 Document Revised: 01/08/2016 Document Reviewed: 03/18/2015  [employer] Interactive Patient Education ©2016 [employer]  Basic Carbohydrate Counting for Diabetes Mellitus  Carbohydrate counting is a method for keeping track of the amount of carbohydrates you eat. Eating carbohydrates naturally increases the level of sugar (glucose) in your blood, so it is important for you to know the amount that is okay for you to have in every meal. Carbohydrate counting helps keep the level of glucose in your blood within normal limits. The amount of carbohydrates allowed is different for every person. A dietitian can help you calculate the amount that is right for you. Once you know the amount of carbohydrates you can have, you can count the carbohydrates in the foods you want to eat.  Carbohydrates are found in the following foods:  · Grains, such as breads and cereals.  · Dried beans and soy products.  · Starchy vegetables, such as potatoes, peas, and corn.  · Fruit and fruit juices.  · Milk and yogurt.  · Sweets and snack foods, such as cake, cookies, candy, chips, soft drinks, and fruit drinks.  CARBOHYDRATE COUNTING  There are two ways to count the carbohydrates in your food. You can use either of the methods or a  "combination of both.  Reading the \"Nutrition Facts\" on Packaged Food  The \"Nutrition Facts\" is an area that is included on the labels of almost all packaged food and beverages in the [place]. It includes the serving size of that food or beverage and information about the nutrients in each serving of the food, including the grams (g) of carbohydrate per serving.   Decide the number of servings of this food or beverage that you will be able to eat or drink. Multiply that number of servings by the number of grams of carbohydrate that is listed on the label for that serving. The total will be the amount of carbohydrates you will be having when you eat or drink this food or beverage.  Learning Standard Serving Sizes of Food  When you eat food that is not packaged or does not include \"Nutrition Facts\" on the label, you need to measure the servings in order to count the amount of carbohydrates. A serving of most carbohydrate-rich foods contains about 15 g of carbohydrates. The following list includes serving sizes of carbohydrate-rich foods that provide 15 g of carbohydrate per serving:    · 1 slice of bread (1 oz) or 1 six-inch tortilla.    · ½ of a hamburger bun or English muffin.  · 4-6 crackers.  · ¾ cup unsweetened dry cereal.    · ½ cup hot cereal.  ·  cup rice or pasta.    · ½ cup mashed potatoes or ¼ of a large baked potato.  · 1 cup fresh fruit or one small piece of fruit.    · ½ cup canned or frozen fruit or fruit juice.  · 1 cup milk.  ·  cup plain fat-free yogurt or yogurt sweetened with artificial sweeteners.  · ½ cup cooked dried beans or starchy vegetable, such as peas, corn, or potatoes.    Decide the number of standard-size servings that you will eat. Multiply that number of servings by 15 (the grams of carbohydrates in that serving). For example, if you eat 2 cups of strawberries, you will have eaten 2 servings and 30 g of carbohydrates (2 servings x 15 g = 30 g). For foods such as soups and " casseroles, in which more than one food is mixed in, you will need to count the carbohydrates in each food that is included.  EXAMPLE OF CARBOHYDRATE COUNTING  Sample Dinner   · 3 oz chicken breast.  ·  cup of brown rice.  · ½ cup of corn.  · 1 cup milk.    · 1 cup strawberries with sugar-free whipped topping.    Carbohydrate Calculation   Step 1: Identify the foods that contain carbohydrates:   · Rice.    · Corn.    · Milk.    · Strawberries.  Step 2: Calculate the number of servings eaten of each:   · 2 servings of rice.    · 1 serving of corn.    · 1 serving of milk.    · 1 serving of strawberries.  Step 3:  Multiply each of those number of servings by 15 g:   · 2 servings of rice x 15 g = 30 g.    · 1 serving of corn x 15 g = 15 g.    · 1 serving of milk x 15 g = 15 g.    · 1 serving of strawberries x 15 g = 15 g.  Step 4: Add together all of the amounts to find the total grams of carbohydrates eaten: 30 g + 15 g + 15 g + 15 g = 75 g.     This information is not intended to replace advice given to you by your health care provider. Make sure you discuss any questions you have with your health care provider.     Document Released: 12/18/2006 Document Revised: 01/08/2016 Document Reviewed: 11/14/2014  [employer] Interactive Patient Education ©2016 [employer]    Prediabetes  Prediabetes is the condition of having a blood sugar (blood glucose) level that is higher than it should be, but not high enough for you to be diagnosed with type 2 diabetes. Having prediabetes puts you at risk for developing type 2 diabetes (type 2 diabetes mellitus). Prediabetes may be called impaired glucose tolerance or impaired fasting glucose.  Prediabetes usually does not cause symptoms. Your health care provider can diagnose this condition with blood tests. You may be tested for prediabetes if you are overweight and if you have at least one other risk factor for prediabetes.  Risk factors for prediabetes include:  · Having a family  member with type 2 diabetes.  · Being overweight or obese.  · Being older than age 45.  · Being of American-, -American, /, or / descent.  · Having an inactive (sedentary) lifestyle.  · Having a history of gestational diabetes or polycystic ovarian syndrome (PCOS).  · Having low levels of good cholesterol (HDL-C) or high levels of blood fats (triglycerides).  · Having high blood pressure.  What is blood glucose and how is blood glucose measured?     Blood glucose refers to the amount of glucose in your bloodstream. Glucose comes from eating foods that contain sugars and starches (carbohydrates) that the body breaks down into glucose. Your blood glucose level may be measured in mg/dL (milligrams per deciliter) or mmol/L (millimoles per liter). Your blood glucose may be checked with one or more of the following blood tests:  · A fasting blood glucose (FBG) test. You will not be allowed to eat (you will fast) for at least 8 hours before a blood sample is taken.  ¨ A normal range for FBG is  mg/dl (3.9-5.6 mmol/L).  · An A1c (hemoglobin A1c) blood test. This test provides information about blood glucose control over the previous 2?3 months.  · An oral glucose tolerance test (OGTT). This test measures your blood glucose twice:  ¨ After fasting. This is your baseline level.  ¨ Two hours after you drink a beverage that contains glucose.  You may be diagnosed with prediabetes:  · If your FBG is 100?125 mg/dL (5.6-6.9 mmol/L).  · If your A1c level is 5.7?6.4%.  · If your OGGT result is 140?199 mg/dL (7.8-11 mmol/L).  These blood tests may be repeated to confirm your diagnosis.  What happens if blood glucose is too high?  The pancreas produces a hormone (insulin) that helps move glucose from the bloodstream into cells. When cells in the body do not respond properly to insulin that the body makes (insulin resistance), excess glucose builds up in the blood instead of going  into cells. As a result, high blood glucose (hyperglycemia) can develop, which can cause many complications. This is a symptom of prediabetes.  What can happen if blood glucose stays higher than normal for a long time?  Having high blood glucose for a long time is dangerous. Too much glucose in your blood can damage your nerves and blood vessels. Long-term damage can lead to complications from diabetes, which may include:  · Heart disease.  · Stroke.  · Blindness.  · Kidney disease.  · Depression.  · Poor circulation in the feet and legs, which could lead to surgical removal (amputation) in severe cases.  How can prediabetes be prevented from turning into type 2 diabetes?     To help prevent type 2 diabetes, take the following actions:  · Be physically active.  ¨ Do moderate-intensity physical activity for at least 30 minutes on at least 5 days of the week, or as much as told by your health care provider. This could be brisk walking, biking, or water aerobics.  ¨ Ask your health care provider what activities are safe for you. A mix of physical activities may be best, such as walking, swimming, cycling, and strength training.  · Lose weight as told by your health care provider.  ¨ Losing 5-7% of your body weight can reverse insulin resistance.  ¨ Your health care provider can determine how much weight loss is best for you and can help you lose weight safely.  · Follow a healthy meal plan. This includes eating lean proteins, complex carbohydrates, fresh fruits and vegetables, low-fat dairy products, and healthy fats.  ¨ Follow instructions from your health care provider about eating or drinking restrictions.  ¨ Make an appointment to see a diet and nutrition specialist (registered dietitian) to help you create a healthy eating plan that is right for you.  · Do not smoke or use any tobacco products, such as cigarettes, chewing tobacco, and e-cigarettes. If you need help quitting, ask your health care provider.  · Take  over-the-counter and prescription medicines as told by your health care provider. You may be prescribed medicines that help lower the risk of type 2 diabetes.  This information is not intended to replace advice given to you by your health care provider. Make sure you discuss any questions you have with your health care provider.  Document Released: 04/10/2017 Document Revised: 05/25/2017 Document Reviewed: 02/07/2017  [employer] Interactive Patient Education © 2017 [employer]    Preventing Type 2 Diabetes Mellitus  Type 2 diabetes (type 2 diabetes mellitus) is a long-term (chronic) disease that affects blood sugar (glucose) levels. Normally, a hormone called insulin allows glucose to enter cells in the body. The cells use glucose for energy. In type 2 diabetes, one or both of these problems may be present:  · The body does not make enough insulin.  · The body does not respond properly to insulin that it makes (insulin resistance).  Insulin resistance or lack of insulin causes excess glucose to build up in the blood instead of going into cells. As a result, high blood glucose (hyperglycemia) develops, which can cause many complications. Being overweight or obese and having an inactive (sedentary) lifestyle can increase your risk for diabetes. Type 2 diabetes can be delayed or prevented by making certain nutrition and lifestyle changes.  What nutrition changes can be made?  · Eat healthy meals and snacks regularly. Keep a healthy snack with you for when you get hungry between meals, such as fruit or a handful of nuts.  · Eat lean meats and proteins that are low in saturated fats, such as chicken, fish, egg whites, and beans. Avoid processed meats.  · Eat plenty of fruits and vegetables and plenty of grains that have not been processed (whole grains). It is recommended that you eat:  ¨ 1½?2 cups of fruit every day.  ¨ 2½?3 cups of vegetables every day.  ¨ 6?8 oz of whole grains every day, such as oats, whole wheat,  bulgur, brown rice, quinoa, and millet.  · Eat low-fat dairy products, such as milk, yogurt, and cheese.  · Eat foods that contain healthy fats, such as nuts, avocado, olive oil, and canola oil.  · Drink water throughout the day. Avoid drinks that contain added sugar, such as soda or sweet tea.  · Follow instructions from your health care provider about specific eating or drinking restrictions.  · Control how much food you eat at a time (portion size).  ¨ Check food labels to find out the serving sizes of foods.  ¨ Use a kitchen scale to weigh amounts of foods.  · Saute or steam food instead of frying it. Cook with water or broth instead of oils or butter.  · Limit your intake of:  ¨ Salt (sodium). Have no more than 1 tsp (2,400 mg) of sodium a day. If you have heart disease or high blood pressure, have less than ½?¾ tsp (1,500 mg) of sodium a day.  ¨ Saturated fat. This is fat that is solid at room temperature, such as butter or fat on meat.  What lifestyle changes can be made?   Activity  · Do moderate-intensity physical activity for at least 30 minutes on at least 5 days of the week, or as much as told by your health care provider.  · Ask your health care provider what activities are safe for you. A mix of physical activities may be best, such as walking, swimming, cycling, and strength training.  · Try to add physical activity into your day. For example:  ¨ Park in spots that are farther away than usual, so that you walk more. For example, park in a far corner of the parking lot when you go to the office or the grocery store.  ¨ Take a walk during your lunch break.  ¨ Use stairs instead of elevators or escalators.  Weight Loss  · Lose weight as directed. Your health care provider can determine how much weight loss is best for you and can help you lose weight safely.  · If you are overweight or obese, you may be instructed to lose at least 5?7 % of your body weight.  Alcohol and Tobacco   · Limit alcohol intake  to no more than 1 drink a day for nonpregnant women and 2 drinks a day for men. One drink equals 12 oz of beer, 5 oz of wine, or 1½ oz of hard liquor.  · Do not use any tobacco products, such as cigarettes, chewing tobacco, and e-cigarettes. If you need help quitting, ask your health care provider.  Work With Your Health Care Provider  · Have your blood glucose tested regularly, as told by your health care provider.  · Discuss your risk factors and how you can reduce your risk for diabetes.  · Get screening tests as told by your health care provider. You may have screening tests regularly, especially if you have certain risk factors for type 2 diabetes.  · Make an appointment with a diet and nutrition specialist (registered dietitian). A registered dietitian can help you make a healthy eating plan and can help you understand portion sizes and food labels.  Why are these changes important?  · It is possible to prevent or delay type 2 diabetes and related health problems by making lifestyle and nutrition changes.  · It can be difficult to recognize signs of type 2 diabetes. The best way to avoid possible damage to your body is to take actions to prevent the disease before you develop symptoms.  What can happen if changes are not made?  · Your blood glucose levels may keep increasing. Having high blood glucose for a long time is dangerous. Too much glucose in your blood can damage your blood vessels, heart, kidneys, nerves, and eyes.  · You may develop prediabetes or type 2 diabetes. Type 2 diabetes can lead to many chronic health problems and complications, such as:  ¨ Heart disease.  ¨ Stroke.  ¨ Blindness.  ¨ Kidney disease.  ¨ Depression.  ¨ Poor circulation in the feet and legs, which could lead to surgical removal (amputation) in severe cases.  Where to find support:  · Ask your health care provider to recommend a registered dietitian, diabetes educator, or weight loss program.  · Look for local or online weight  loss groups.  · Join a gym, fitness club, or outdoor activity group, such as a walking club.  Where to find more information:  To learn more about diabetes and diabetes prevention, visit:  · American Diabetes Association (ADA): www.diabetes.org  · National [place] of Diabetes and Digestive and Kidney Diseases: www.niddk.nih.gov/health-information/diabetes  To learn more about healthy eating, visit:  · The [place]. Department of Agriculture ([employer]), Choose My Plate: www.[employer].gov/food-groups  · Office of Disease Prevention and Health Promotion (ODPHP), Dietary Guidelines: www.health.gov/dietaryguidelines  Summary  · You can reduce your risk for type 2 diabetes by increasing your physical activity, eating healthy foods, and losing weight as directed.  · Talk with your health care provider about your risk for type 2 diabetes. Ask about any blood tests or screening tests that you need to have.  This information is not intended to replace advice given to you by your health care provider. Make sure you discuss any questions you have with your health care provider.  Document Released: 04/10/2017 Document Revised: 05/25/2017 Document Reviewed: 02/07/2017  [employer] Interactive Patient Education © 2017 [employer]    Preventing Chronic Kidney Disease  Chronic kidney disease (CKD) occurs when the kidneys are damaged for at least 3 months and do not function effectively. The kidneys are two organs that do many important jobs in the body, including:  · Removing waste and extra fluids from the blood.  · Regulating hormones, blood pressure, and blood chemistry.  At first, you may not notice any signs or symptoms of CKD. However, CKD gets worse over time (is progressive). You can prevent CKD or keep CKD from progressing by making certain changes to your lifestyle and nutrition.  Risk factors for CKD include:  · Being age 60 or older.  · Being female.  · Having any of the following:  ¨ Diabetes.  ¨ High blood  pressure.  ¨ Heart disease.  ¨ An autoimmune disease.  ¨ Frequent urinary tract infections.  ¨ Polycystic kidney disease.  ¨ A family history of kidney disease, heart disease, diabetes, or high blood pressure.  · Having problems with urine flow that may be caused by:  ¨ Cancer.  ¨ Having kidney stones more than once.  ¨ An enlarged prostate in males.  · Being of -American, , , , or  descent.  · Being obese.  · Long-term use of NSAIDs.  · Current or former tobacco use.  What types of nutrition changes can I make?  A balanced meal plan can help keep your kidneys healthy and prevent CKD. A balanced meal plan may involve:  · Limiting salt (sodium) intake. You should have less than 1 tsp (2,300 mg) of sodium per day. If you have heart disease or high blood pressure, you should have less than ¾ tsp (1,500 mg) of sodium per day.  · Limiting alcohol intake to no more than 1 drink a day for nonpregnant women and 2 drinks a day for men. One drink equals 12 oz of beer, 5 oz of wine, or 1½ oz of hard liquor.  If you have diabetes, work with a nutrition specialist (registered dietitian) or a certified diabetes educator to develop a healthy eating plan.  What types of lifestyle changes can I make?  Lifestyle changes that can help prevent CKD include:  · Talking with your health care provider about how much fluid you should drink each day.  · Working with your health care provider to manage your blood pressure. This includes healthy eating, regular exercise, and taking any medicines that are prescribed for you.  · Exercising for at least 30 minutes on five or more days of the week, or as much as told by your health care provider.  · Keeping your weight at a healthy level. If you are overweight or obese, lose weight as told by your health care provider.  · Not smoking or using any tobacco products, such as cigarettes, chewing tobacco, and e-cigarettes. If you need help quitting,  ask your health care provider.  · Having a yearly physical exam.  · Learning about your family's medical history. Talk to your relatives and siblings about diabetes, heart disease, and high blood pressure.  · Using NSAIDs for pain only when necessary. Ask your health care provider about other pain medicines that do not increase your risk of developing CKD.  If you have diabetes, managing your diabetes with a healthy meal plan and physical activity can help prevent CKD. Work with your health care provider to manage your condition.  What can happen if changes are not made?  If you do not make lifestyle and nutrition changes to protect your kidneys, you may develop CKD, which can lead to:  · A low red blood cell count (anemia).  · Heart disease.  · Weak bones.  · Nerve damage (neuropathy).  · Stroke.  · Kidney failure and dialysis.  What can I do to lower my risk?  Talk to your health care provider about your kidney health and your risk factors for CKD. The most important steps to take to lower your risk of CKD are:  · Getting high blood pressure down to the target that your health care provider recommends.  · Getting blood sugar (glucose) levels down to the target that your health care provider recommends.  · Eating less sodium.  What are my treatment options for CKD?     Treatment for CKD may include:  · Medicines that:  ¨ Control high blood pressure, such as ACE inhibitors.  ¨ Control blood glucose levels.  ¨ Control cholesterol levels.  ¨ Help your body get rid of excess water (diuretics).  ¨ Help protect your bones.  · Lifestyle changes. You may need to:  ¨ Eat less salt.  ¨ Eat less protein.  ¨ Follow a heart-healthy meal plan.  ¨ Quit smoking.  ¨ Avoid phosphorus. Phosphorous is found in dark-colored sodas and canned ice teas.  ¨ Avoid potassium. Potassium is found in some juices, especially orange juice.  ¨ Avoid alcohol.  · Dialysis. This is when a machine filters your blood if your kidney fails.  · Kidney  transplant, if your kidney fails.  Where can I get more information?  Learn more about CKD and how to prevent CKD from:  · The National Kidney Foundation: www.kidney.org  · The American Association of Kidney Patients: www.aakp.org  · The American Diabetes Association: www.diabetes.org  Summary  You may not notice symptoms of CKD until your kidneys have already been damaged. The best way to prevent kidney damage is to know your risk factors and make nutrition and lifestyle changes before you develop symptoms of CKD.  This information is not intended to replace advice given to you by your health care provider. Make sure you discuss any questions you have with your health care provider.  Document Released: 01/13/2017 Document Revised: 08/30/2017 Document Reviewed: 11/14/2016  [employer] Interactive Patient Education © 2017 [employer]  Food Basics for Chronic Kidney Disease  When your kidneys are not working well, they cannot remove waste and excess substances from your blood as effectively as they did before. This can lead to a buildup and imbalance of these substances, which can affect how your body functions. This buildup can also make your kidneys work harder, causing even more damage.  You may need to eat less of certain foods that can lead to the buildup of these substances in your body. By making the changes to your diet that are recommended by your dietitian or health care provider, you could possibly help prevent further kidney damage and delay or prevent the need for dialysis.  The following information can help give you a basic understanding of these substances and how they affect your bodily functions. The information also gives examples of foods that contain the highest amounts of these substances.  WHAT DO I NEED TO KNOW ABOUT SUBSTANCES IN MY FOOD THAT I MAY NEED TO ADJUST?  Food adjustments will be different for each person with chronic kidney disease. It is important that you see a dietitian who can  help you determine the specific adjustments that you will need to make for each of the following substances:  Potassium   Potassium affects how steadily your heart beats. If too much potassium builds up in your blood, it can cause an irregular heartbeat or even a heart attack.  Examples of foods rich in potassium include:  · Milk.  · Fruits.  · Vegetables.  Phosphorus   Phosphorus is a mineral found in your bones. A balance between calcium and phosphorous is needed to build and maintain healthy bones. Too much phosphorus pulls calcium from your bones. This can make your bones weak and more likely to break. Too much phosphorus can also make your skin itch.  Examples of foods rich in phosphorus include:  · Milk and cheese.  · Dried beans.  · Peas.  · Bar.  · Nuts and peanut butter.  Animal Protein   Animal protein helps you make and keep muscle. It also helps in the repair of your body's cells and tissues. One of the natural breakdown products of protein is a waste product called urea. When your kidneys are not working properly, they cannot remove wastes such as urea like they did before you developed chronic kidney disease. You will likely need to limit the amount of protein you eat to help prevent a buildup of urea in your blood.  Examples of animal protein include:  · Meat (all types).  · Fish and seafood.  · Poultry.  · Eggs.  Sodium   Sodium, which is found in salt, helps maintain a healthy balance of fluids in your body. Too much sodium can increase your blood pressure level and have a negative affect on the function of your heart and lungs. Too much sodium also can cause your body to retain too much fluid, making your kidneys work harder. Examples of foods with high levels of sodium include:  · Salt seasonings.  · Soy sauce.  · Cured and processed meats.  · Salted crackers and snack foods.  · Fast food.  · Canned soups and most canned foods.  Glucose   Glucose provides energy for your body. If you have  diabetes mellitus that is not properly controlled, you have too much glucose in your blood. Too much glucose in your blood can worsen the function of your kidneys by damaging small blood vessels. This prevents enough blood flow to your kidneys to give them what they need to work. If you have diabetes mellitus and chronic kidney disease, it is important to maintain your blood glucose at a level recommended by your health care provider.  SHOULD I TAKE A VITAMIN AND MINERAL SUPPLEMENT?  Because you may need to avoid eating certain foods, you may not get all of the vitamins and minerals that would normally come from those foods. Your health care provider or dietitian may recommend that you take a supplement to ensure that you get all of the vitamins and minerals that your body needs.   This information is not intended to replace advice given to you by your health care provider. Make sure you discuss any questions you have with your health care provider.  Document Released: 03/09/2004 Document Revised: 01/08/2016 Document Reviewed: 11/14/2014  [facility] Interactive Patient Education © 2017 [employer]

## 2020-04-04 DIAGNOSIS — I10 ESSENTIAL HYPERTENSION: ICD-10-CM

## 2020-04-06 RX ORDER — LISINOPRIL 20 MG/1
TABLET ORAL
Qty: 100 TAB | Refills: 0 | OUTPATIENT
Start: 2020-04-06

## 2020-04-14 ENCOUNTER — OFFICE VISIT (OUTPATIENT)
Dept: MEDICAL GROUP | Facility: PHYSICIAN GROUP | Age: 80
End: 2020-04-14
Payer: MEDICARE

## 2020-04-14 DIAGNOSIS — E78.49 OTHER HYPERLIPIDEMIA: Chronic | ICD-10-CM

## 2020-04-14 DIAGNOSIS — R73.03 PREDIABETES: Chronic | ICD-10-CM

## 2020-04-14 DIAGNOSIS — E03.9 ACQUIRED HYPOTHYROIDISM: ICD-10-CM

## 2020-04-14 DIAGNOSIS — I10 ESSENTIAL HYPERTENSION: ICD-10-CM

## 2020-04-14 PROBLEM — N32.81 OVERACTIVE BLADDER: Chronic | Status: ACTIVE | Noted: 2018-09-20

## 2020-04-14 PROCEDURE — 99443 PR PHYSICIAN TELEPHONE EVALUATION 21-30 MIN: CPT | Mod: CR | Performed by: INTERNAL MEDICINE

## 2020-04-14 RX ORDER — LEVOTHYROXINE SODIUM 0.03 MG/1
25 TABLET ORAL
Qty: 100 TAB | Refills: 1 | Status: SHIPPED | OUTPATIENT
Start: 2020-04-14 | End: 2020-09-28

## 2020-04-14 RX ORDER — LISINOPRIL 30 MG/1
30 TABLET ORAL DAILY
Qty: 100 TAB | Refills: 0 | Status: SHIPPED | OUTPATIENT
Start: 2020-04-14 | End: 2020-07-23

## 2020-04-14 NOTE — PROGRESS NOTES
Telephone Appointment Visit   As a means of avoiding spread of COVID-19, this visit is being conducted by telephone. This telephone visit was initiated by the patient and they verbally consented.    Time at start of call: 924am    Reason for Call:  Symptom Follow-up    Patient Comments / History:   Hypothyroidism.  This is a chronic and stable condition.  The patient is presently taking Synthroid.  She is requesting a refill for the medication.  Recent lab completed on June 29, 2020 show normal TSH    Hypertension.  This is a chronic and stable condition but the patient is currently taking amlodipine and lisinopril.  No side effect reported.  The patient reported that her blood pressure has been fairly well controlled at home.    Prediabetes.  Noted by recent lab test.  Advised the patient regarding low sweet and low-carb diet.  Patient reported family history of diabetes.  Continue to monitor.    Hyperlipidemia.  This is a chronic and stable condition.  Presently the patient taking atorvastatin daily.  Recent lab work shows cholesterol 214 triglyceride 143 .  Advised the patient regarding a low-fat and low-cholesterol diet and encouraged regular walking exercise activity.    Labs / Images Reviewed   Recent lab result discussed with patient.    Assessment and Plan:     1. Acquired hypothyroidism  - levothyroxine (SYNTHROID) 25 MCG Tab; Take 1 Tab by mouth Every morning on an empty stomach.  Dispense: 100 Tab; Refill: 1    2. Essential hypertension  - lisinopril (PRINIVIL) 30 MG tablet; Take 1 Tab by mouth every day.  Dispense: 100 Tab; Refill: 0    3. Prediabetes    4. Other hyperlipidemia    Plan  Continue with current meds  Refills given and sent to the pharmacy.  Recommend low-fat low-cholesterol low-carb diet.  Advised patient to continue to monitor blood pressure daily at home.    Follow-up: Return in about 6 months (around 10/14/2020) for high blood pressure followup.    Time at end of call:  946am  Total Time Spent: 21-30 minutes    Meir Thomason M.D.

## 2020-05-11 ENCOUNTER — OFFICE VISIT (OUTPATIENT)
Dept: URGENT CARE | Facility: PHYSICIAN GROUP | Age: 80
End: 2020-05-11
Payer: MEDICARE

## 2020-05-11 VITALS
WEIGHT: 228 LBS | BODY MASS INDEX: 38.93 KG/M2 | SYSTOLIC BLOOD PRESSURE: 150 MMHG | OXYGEN SATURATION: 93 % | TEMPERATURE: 99.1 F | DIASTOLIC BLOOD PRESSURE: 82 MMHG | HEART RATE: 76 BPM | HEIGHT: 64 IN | RESPIRATION RATE: 16 BRPM

## 2020-05-11 DIAGNOSIS — S93.421A SPRAIN OF DELTOID LIGAMENT OF RIGHT ANKLE, INITIAL ENCOUNTER: ICD-10-CM

## 2020-05-11 PROCEDURE — 99213 OFFICE O/P EST LOW 20 MIN: CPT | Performed by: FAMILY MEDICINE

## 2020-05-11 ASSESSMENT — ENCOUNTER SYMPTOMS
VOMITING: 0
SHORTNESS OF BREATH: 0
FEVER: 0
COUGH: 0

## 2020-05-11 ASSESSMENT — FIBROSIS 4 INDEX: FIB4 SCORE: 1.39

## 2020-05-11 NOTE — PROGRESS NOTES
Subjective:     Katharine Gee is a 79 y.o. female who presents for Ankle Pain (R ankle swollen and painful x 3 days)    HPI  Pt presents for evaluation of a new problem  Patient is an 79-year-old female with new onset ankle pain the last 3 days  Ankle has been swollen intermittently, mildly bruised along the medial aspect, and more painful  Pain is worse with ambulation  Patient has been working more in her yard, and has been up on her feet more than usual, however does not remember a distinct fall/injury  Denies calf pain or calf swelling  Does not feel ill otherwise and has no other complaints    Review of Systems   Constitutional: Negative for fever.   Respiratory: Negative for cough and shortness of breath.    Cardiovascular: Negative for chest pain.   Gastrointestinal: Negative for vomiting.   Musculoskeletal: Positive for joint pain.   Skin: Negative for rash.       PMH:  has a past medical history of Acquired hypothyroidism (7/18/2017), Arthritis, HTN (hypertension), Hypertension, Kidney stone, Memory loss (10/4/2017), Menopause (2/2/2015), Migraine, Obesity (BMI 30-39.9) (4/5/2018), and Polymyalgia rheumatica (HCC). She also has no past medical history of Anxiety, Arrhythmia, Asthma, Blood transfusion without reported diagnosis, Cancer ([facility]), CHF (congestive heart failure) ([facility]), Clotting disorder (HCC), COPD (chronic obstructive pulmonary disease) (HCC), Depression, Diabetes (HCC), GERD (gastroesophageal reflux disease), Heart attack (HCC), IBD (inflammatory bowel disease), Seizure ([facility]), Stroke ([facility]), Substance abuse ([facility]), or Urinary tract infection.  MEDS:   Current Outpatient Medications:   •  levothyroxine (SYNTHROID) 25 MCG Tab, Take 1 Tab by mouth Every morning on an empty stomach., Disp: 100 Tab, Rfl: 1  •  lisinopril (PRINIVIL) 30 MG tablet, Take 1 Tab by mouth every day., Disp: 100 Tab, Rfl: 0  •  tolterodine ER (DETROL LA) 4 MG CAPSULE SR 24 HR, Take 4 mg by mouth every day., Disp: ,  "Rfl:   •  atorvastatin (LIPITOR) 20 MG Tab, Take 1 Tab by mouth every bedtime., Disp: 100 Tab, Rfl: 1  •  amLODIPine (NORVASC) 5 MG Tab, Take 1 Tab by mouth every day., Disp: 100 Tab, Rfl: 1  •  aspirin 81 MG tablet, Take 81 mg by mouth every day., Disp: , Rfl:   ALLERGIES: No Known Allergies  SURGHX:   Past Surgical History:   Procedure Laterality Date   • EXPLORATORY LAPAROTOMY  7/3/2014    Performed by Danita Tolbert M.D. at SURGERY [facility]   • BOWEL RESECTION  7/3/2014    Performed by Danita Tolbert M.D. at SURGERY [facility]   • PB REVISE SECONDARY VARICOSITY      \"varicose veins operated on \"    • TONSILLECTOMY     • VAGINAL HYSTERECTOMY TOTAL       SOCHX:  reports that she has never smoked. She has never used smokeless tobacco. She reports that she does not drink alcohol or use drugs.  FH: Family history was reviewed, not contributing to acute ankle pain      Objective:   /82 (BP Location: Left arm, Patient Position: Sitting, BP Cuff Size: Adult)   Pulse 76   Temp 37.3 °C (99.1 °F)   Resp 16   Ht 1.626 m (5' 4\")   Wt 103.4 kg (228 lb)   SpO2 93%   BMI 39.14 kg/m²     Physical Exam  Constitutional:       General: She is not in acute distress.     Appearance: She is well-developed. She is not diaphoretic.   HENT:      Head: Normocephalic and atraumatic.   Musculoskeletal:      Comments: Right ankle/foot:  Appearance - +Mild ecchymosis along medial ankle and 1+ pitting edema throughout ankle  Palpation - No TTP along medial malleolus. +TTP along deltoid ligament.  No TTP of lateral malleolus +TTP along ATFL and CFL.  No TTP along midfoot, base of the 5th metatarsal, MTP joints, or toes.   ROM - FROM throughout  Strength - 5/5 throughout  Special testing - Neg squeeze test, neg drawer test, neg Sunitha's   Neurovascular - 2+ dorsalis pedis and posterior tibial.  Sensation intact and equal bilaterally  Gait - nonantalgi   Skin:     General: Skin is warm and dry.      Findings: No " erythema.   Neurological:      Mental Status: She is alert and oriented to person, place, and time.   Psychiatric:         Behavior: Behavior normal.         Thought Content: Thought content normal.         Judgment: Judgment normal.         Assessment/Plan:   Assessment    1. Sprain of deltoid ligament of right ankle, initial encounter    Patient is an 79-year-old female with right ankle sprain.  Has mild bruising and tenderness along deltoid ligament.  Patient able to ambulate without limp and appears stable.  She has no signs/symptoms of DVT.  Advised to elevate ankle for swelling and to use Ace wrap when she is up on her feet to help swelling.  Advised to make sure that she wears supportive shoes when out working in the garden to prevent any increased injury.  As long as symptoms are resolving, she does not require any further follow-up in urgent care.

## 2020-07-10 ENCOUNTER — TELEPHONE (OUTPATIENT)
Dept: MEDICAL GROUP | Facility: PHYSICIAN GROUP | Age: 80
End: 2020-07-10

## 2020-07-10 ENCOUNTER — PATIENT MESSAGE (OUTPATIENT)
Dept: MEDICAL GROUP | Facility: PHYSICIAN GROUP | Age: 80
End: 2020-07-10

## 2020-07-10 DIAGNOSIS — M25.519 SHOULDER PAIN, UNSPECIFIED CHRONICITY, UNSPECIFIED LATERALITY: ICD-10-CM

## 2020-07-10 NOTE — TELEPHONE ENCOUNTER
----- Message from Katharine Gee sent at 7/10/2020  1:26 PM PDT -----  Regarding: Non-Urgent Medical Question  Contact: [phone]  Hello, we need a new referral for Dr. Golden please.  We have appt with him on July 17.    Thank you.

## 2020-07-10 NOTE — TELEPHONE ENCOUNTER
1. Caller Name: Katharine                          Call Back Number: [phone] (home)         How would the patient prefer to be contacted with a response: Did not specify    2. SPECIFIC Action To Be Taken: Referral pending, please sign.    3. Diagnosis/Clinical Reason for Request: Acute px of left shoulder    4. Specialty & Provider Name/Lab/Imaging Location: Dr. Chuck Golden    5. Is appointment scheduled for requested order/referral: yes - July 17, 2020    Patient was not informed they will receive a return phone call from the office ONLY if there are any questions before processing their request. Advised to call back if they haven't received a call from the referral department in 5 days.

## 2020-07-10 NOTE — TELEPHONE ENCOUNTER
From: Katharine Gee  To: Meir Thomason M.D.  Sent: 7/10/2020 1:26 PM PDT  Subject: Non-Urgent Medical Question    Hello, we need a new referral for Dr. Golden please. We have appt with him on July 17.    Thank you.

## 2020-07-17 ENCOUNTER — OFFICE VISIT (OUTPATIENT)
Dept: MEDICAL GROUP | Facility: CLINIC | Age: 80
End: 2020-07-17
Payer: MEDICARE

## 2020-07-17 VITALS
OXYGEN SATURATION: 94 % | DIASTOLIC BLOOD PRESSURE: 72 MMHG | BODY MASS INDEX: 38.93 KG/M2 | HEART RATE: 87 BPM | WEIGHT: 228 LBS | TEMPERATURE: 98.6 F | HEIGHT: 64 IN | SYSTOLIC BLOOD PRESSURE: 132 MMHG | RESPIRATION RATE: 18 BRPM

## 2020-07-17 DIAGNOSIS — M75.42 SUBACROMIAL IMPINGEMENT OF LEFT SHOULDER: ICD-10-CM

## 2020-07-17 DIAGNOSIS — M75.81 ROTATOR CUFF TENDINITIS, RIGHT: ICD-10-CM

## 2020-07-17 PROCEDURE — 99213 OFFICE O/P EST LOW 20 MIN: CPT | Mod: 25 | Performed by: FAMILY MEDICINE

## 2020-07-17 PROCEDURE — 20610 DRAIN/INJ JOINT/BURSA W/O US: CPT | Mod: RT | Performed by: FAMILY MEDICINE

## 2020-07-17 RX ORDER — TRIAMCINOLONE ACETONIDE 40 MG/ML
40 INJECTION, SUSPENSION INTRA-ARTICULAR; INTRAMUSCULAR ONCE
Status: COMPLETED | OUTPATIENT
Start: 2020-07-17 | End: 2020-07-17

## 2020-07-17 RX ADMIN — TRIAMCINOLONE ACETONIDE 40 MG: 40 INJECTION, SUSPENSION INTRA-ARTICULAR; INTRAMUSCULAR at 10:56

## 2020-07-17 ASSESSMENT — FIBROSIS 4 INDEX: FIB4 SCORE: 1.39

## 2020-07-17 NOTE — PROGRESS NOTES
"CHIEF COMPLAINT:  Katharine Gee female presenting at the request of Pollo Jimenez M.D. for evaluation of Shoulder pain.     Katharine Gee is complaining of left shoulder pain  Insidious onset without injury approximately mid June 2019  She underwent subacromial corticosteroid injection back then which HELPED up until recently (last month)  Pain is at the deltoid region  Quality is aching, sharp  Pain is Non-radiating  Aggravated by movement and overhead activity  Improved with  nothing   previous shoulder pain which responded to corticosteroid injections \"several years ago\"  Prior studies: X-Ray     Sewing and kaley as well as house work     PHYSICAL EXAM:  /72 (BP Location: Left arm, Patient Position: Sitting, BP Cuff Size: Adult)   Pulse 87   Temp 37 °C (98.6 °F) (Temporal)   Resp 18   Ht 1.626 m (5' 4\")   Wt 103.4 kg (228 lb)   SpO2 94%   BMI 39.14 kg/m²        obese in no apparent distress, alert and oriented x 3.  Gait: normal    Shoulder Exam:    RIGHT Shoulder:  No visible swelling   Range of motion INTACT  Tenderness: Non-tender  Empty Can Testing 5/5  Internal Rotation 5/5  External Rotation 5/5  Lift Off Testing 5/5  Impingement testing Soares  NEGATIVE  Neer's testing NEGATIVE  Apprehension testing NEGATIVE    LEFT Shoulder:  No visible swelling   Range of motion decreased with abduction on the RIGHT compared to left  Tenderness: Non-tender  Empty Can Testing 5/5  Internal Rotation 5/5  External Rotation 5/5  Lift Off Testing 5/5  Impingement testing Soares  POSITIVE  Neer's testing POSITIVE  Apprehension testing POSITIVE    Additional Findings: Flexed Posture    1. Subacromial impingement of left shoulder     2. Rotator cuff tendinitis, right       LEFT subacromial corticosteroid injection performed (July 22, 2019) HELPED for about a year  REPEAT RIGHT subacromial corticosteroid injection performed in the office TODAY (July 17, 2020)    Return if symptoms worsen " or fail to improve.  IF symptoms persist consider referral for subacromial decompression, she had relief from her initial injection from last year        7/10/2019 11:18 AM    HISTORY/REASON FOR EXAM:  Atraumatic Pain/Swelling/Deformity  Left shoulder pain x 1 month    TECHNIQUE/EXAM DESCRIPTION AND NUMBER OF VIEWS:  3 views of the LEFT shoulder.    COMPARISON: 8/20/2014    FINDINGS:  Small subacromial enthesophyte.  No acute fracture or dislocation.  Moderate osteoarthritis of the AC joint and glenohumeral joint.     Impression         Small subacromial enthesophyte.    Moderate osteoarthritis of the AC joint and glenohumeral joint.     Thank you Pollo Jimenez M.D. for allowing me to participate in caring for your patient.

## 2020-07-23 DIAGNOSIS — I10 ESSENTIAL HYPERTENSION: ICD-10-CM

## 2020-07-23 RX ORDER — LISINOPRIL 30 MG/1
TABLET ORAL
Qty: 100 TAB | Refills: 0 | Status: SHIPPED | OUTPATIENT
Start: 2020-07-23 | End: 2020-10-02

## 2020-08-18 DIAGNOSIS — E78.49 OTHER HYPERLIPIDEMIA: ICD-10-CM

## 2020-08-18 DIAGNOSIS — R73.03 PREDIABETES: ICD-10-CM

## 2020-08-18 DIAGNOSIS — I10 ESSENTIAL HYPERTENSION: ICD-10-CM

## 2020-08-18 RX ORDER — ATORVASTATIN CALCIUM 20 MG/1
TABLET, FILM COATED ORAL
Qty: 100 TAB | Refills: 1 | Status: SHIPPED | OUTPATIENT
Start: 2020-08-18 | End: 2021-03-16

## 2020-09-19 DIAGNOSIS — I10 ESSENTIAL HYPERTENSION: ICD-10-CM

## 2020-09-21 RX ORDER — AMLODIPINE BESYLATE 5 MG/1
TABLET ORAL
Qty: 90 TAB | Refills: 1 | Status: SHIPPED | OUTPATIENT
Start: 2020-09-21 | End: 2021-04-20

## 2020-09-21 NOTE — TELEPHONE ENCOUNTER
Refill X 6 months, sent to pharmacy.Pt. Seen in the last 6 months per protocol.   Lab Results   Component Value Date/Time    SODIUM 137 01/29/2020 12:18 PM    POTASSIUM 4.2 01/29/2020 12:18 PM    CHLORIDE 105 01/29/2020 12:18 PM    CO2 22 01/29/2020 12:18 PM    GLUCOSE 78 01/29/2020 12:18 PM    BUN 27 (H) 01/29/2020 12:18 PM    CREATININE 0.96 01/29/2020 12:18 PM

## 2020-09-26 DIAGNOSIS — E03.9 ACQUIRED HYPOTHYROIDISM: ICD-10-CM

## 2020-09-28 DIAGNOSIS — E03.9 ACQUIRED HYPOTHYROIDISM: Chronic | ICD-10-CM

## 2020-09-28 DIAGNOSIS — E78.49 OTHER HYPERLIPIDEMIA: Chronic | ICD-10-CM

## 2020-09-28 DIAGNOSIS — R73.03 PREDIABETES: Chronic | ICD-10-CM

## 2020-09-28 DIAGNOSIS — I10 ESSENTIAL HYPERTENSION: Chronic | ICD-10-CM

## 2020-09-28 RX ORDER — LEVOTHYROXINE SODIUM 0.03 MG/1
25 TABLET ORAL
Qty: 90 TAB | Refills: 2 | Status: SHIPPED | OUTPATIENT
Start: 2020-09-28 | End: 2021-04-29

## 2020-10-02 DIAGNOSIS — I10 ESSENTIAL HYPERTENSION: ICD-10-CM

## 2020-10-02 RX ORDER — LISINOPRIL 30 MG/1
TABLET ORAL
Qty: 100 TAB | Refills: 0 | Status: SHIPPED | OUTPATIENT
Start: 2020-10-02 | End: 2020-11-12

## 2020-10-12 ENCOUNTER — OFFICE VISIT (OUTPATIENT)
Dept: MEDICAL GROUP | Facility: PHYSICIAN GROUP | Age: 80
End: 2020-10-12
Payer: MEDICARE

## 2020-10-12 ENCOUNTER — HOSPITAL ENCOUNTER (OUTPATIENT)
Dept: RADIOLOGY | Facility: MEDICAL CENTER | Age: 80
End: 2020-10-12
Attending: INTERNAL MEDICINE
Payer: MEDICARE

## 2020-10-12 VITALS
RESPIRATION RATE: 14 BRPM | BODY MASS INDEX: 37.22 KG/M2 | DIASTOLIC BLOOD PRESSURE: 62 MMHG | TEMPERATURE: 97.2 F | SYSTOLIC BLOOD PRESSURE: 104 MMHG | HEIGHT: 64 IN | OXYGEN SATURATION: 95 % | WEIGHT: 218 LBS | HEART RATE: 73 BPM

## 2020-10-12 DIAGNOSIS — E55.9 VITAMIN D DEFICIENCY: ICD-10-CM

## 2020-10-12 DIAGNOSIS — E78.49 OTHER HYPERLIPIDEMIA: Chronic | ICD-10-CM

## 2020-10-12 DIAGNOSIS — G89.29 CHRONIC PAIN OF RIGHT ANKLE: ICD-10-CM

## 2020-10-12 DIAGNOSIS — I10 ESSENTIAL HYPERTENSION: Chronic | ICD-10-CM

## 2020-10-12 DIAGNOSIS — M25.571 CHRONIC PAIN OF RIGHT ANKLE: ICD-10-CM

## 2020-10-12 DIAGNOSIS — R42 DIZZINESS: ICD-10-CM

## 2020-10-12 DIAGNOSIS — R73.03 PREDIABETES: Chronic | ICD-10-CM

## 2020-10-12 DIAGNOSIS — N32.81 OVERACTIVE BLADDER: Chronic | ICD-10-CM

## 2020-10-12 DIAGNOSIS — E03.9 ACQUIRED HYPOTHYROIDISM: Chronic | ICD-10-CM

## 2020-10-12 DIAGNOSIS — R51.9 NONINTRACTABLE EPISODIC HEADACHE, UNSPECIFIED HEADACHE TYPE: ICD-10-CM

## 2020-10-12 PROBLEM — Z02.9 ADMINISTRATIVE ENCOUNTER: Status: RESOLVED | Noted: 2019-11-01 | Resolved: 2020-10-12

## 2020-10-12 PROBLEM — N18.30 CKD (CHRONIC KIDNEY DISEASE) STAGE 3, GFR 30-59 ML/MIN: Chronic | Status: ACTIVE | Noted: 2020-02-05

## 2020-10-12 PROCEDURE — 99214 OFFICE O/P EST MOD 30 MIN: CPT | Performed by: INTERNAL MEDICINE

## 2020-10-12 PROCEDURE — 73610 X-RAY EXAM OF ANKLE: CPT | Mod: RT

## 2020-10-12 RX ORDER — MECLIZINE HCL 12.5 MG/1
12.5 TABLET ORAL
Qty: 60 TAB | Refills: 1 | Status: SHIPPED | OUTPATIENT
Start: 2020-10-12 | End: 2021-04-29

## 2020-10-12 SDOH — HEALTH STABILITY: MENTAL HEALTH: HOW OFTEN DO YOU HAVE A DRINK CONTAINING ALCOHOL?: MONTHLY OR LESS

## 2020-10-12 ASSESSMENT — FIBROSIS 4 INDEX: FIB4 SCORE: 1.39

## 2020-10-12 NOTE — ASSESSMENT & PLAN NOTE
This is a chronic condition noted since last 6 months.  She denied recent trauma or injury.  Patient complaining of a bruise in the medial aspect of the ankle.

## 2020-10-12 NOTE — ASSESSMENT & PLAN NOTE
This is a new condition noted since last 4 weeks.  It is described as a room spinning sensation occurs with certain head positions  Patient denies any recent head trauma injury.  Denies any change in vision.  Patient denied motor weakness paresthesia or slurred speech chest pain shortness of breath or palpitation.  Associating symptoms patient also reported intermittent headaches noted in the forehead area.  It is described as a throbbing sensation.  The patient denies fever or chills.  Currently no dizziness or headaches noted.  Patient reported that the dizziness symptoms may occur anytime but mainly at nighttime especially when she is laying down occurring approximately 2-3 times a day.

## 2020-10-12 NOTE — ASSESSMENT & PLAN NOTE
Chronic condition.  Patient currently taking vitamin D over-the-counter patient due for blood test

## 2020-10-12 NOTE — ASSESSMENT & PLAN NOTE
This is a chronic condition patient presently taking levothyroxine daily.  Patient is due for blood test.

## 2020-10-12 NOTE — ASSESSMENT & PLAN NOTE
This is a chronic condition.  Currently patient is taking atorvastatin.  The patient is due for blood test.

## 2020-10-12 NOTE — PROGRESS NOTES
CC: Follow-up hypertension  Recurrent dizziness  Right ankle pain      HPI: This is a 79 y.o. pt.  Pt's medical history is notable for:     Acquired hypothyroidism  This is a chronic condition patient presently taking levothyroxine daily.  Patient is due for blood test.    HTN (hypertension)  Chronic condition.  Patient is presently taking lisinopril.  No side effect reported.    Overactive bladder  This has been a chronic condition.  Patient is now taking Detrol.    Prediabetes  Chronic condition patient is currently on diet therapy.  Patient is due for lab test.    Other hyperlipidemia  This is a chronic condition.  Currently patient is taking atorvastatin.  The patient is due for blood test.    Vitamin D deficiency  Chronic condition.  Patient currently taking vitamin D over-the-counter patient due for blood test    Dizziness  This is a new condition noted since last 4 weeks.  It is described as a room spinning sensation occurs with certain head positions  Patient denies any recent head trauma injury.  Denies any change in vision.  Patient denied motor weakness paresthesia or slurred speech chest pain shortness of breath or palpitation.  Associating symptoms patient also reported intermittent headaches noted in the forehead area.  It is described as a throbbing sensation.  The patient denies fever or chills.  Currently no dizziness or headaches noted.  Patient reported that the dizziness symptoms may occur anytime but mainly at nighttime especially when she is laying down occurring approximately 2-3 times a day.    Right ankle pain  This is a chronic condition noted since last 6 months.  She denied recent trauma or injury.  Patient complaining of a bruise in the medial aspect of the ankle.          REVIEW OF SYSTEMS:     Constitutional:  no fever / chills   Eyes: no changes in vision  ENT: no sore throat, no hearing loss  CV:  no chest pain, no palpitations  Pulmonary: no SOB, no cough    GI: no nausea /  "vomiting, no diarrhea, no constipation  :  no dysuria, no hematuria   Skin: no rash  Hematologic: no bleeding      Allergies: Patient has no known allergies.    Current Outpatient Medications Ordered in Epic   Medication Sig Dispense Refill   • meclizine (ANTIVERT) 12.5 MG Tab Take 1 Tab by mouth 2 times daily with meals as needed. 60 Tab 1   • lisinopril (PRINIVIL) 30 MG tablet TAKE 1 TABLET BY MOUTH EVERY  Tab 0   • levothyroxine (SYNTHROID) 25 MCG Tab TAKE 1 TAB BY MOUTH EVERY MORNING ON AN EMPTY STOMACH. 90 Tab 2   • amLODIPine (NORVASC) 5 MG Tab TAKE 1 TABLET BY MOUTH EVERY DAY 90 Tab 1   • atorvastatin (LIPITOR) 20 MG Tab TAKE 1 TABLET BY MOUTH EVERYDAY AT BEDTIME 100 Tab 1   • tolterodine ER (DETROL LA) 4 MG CAPSULE SR 24 HR Take 4 mg by mouth every day.     • aspirin 81 MG tablet Take 81 mg by mouth every day.       No current [facility]-ordered facility-administered medications on file.        Past Medical History:   Diagnosis Date   • Acquired hypothyroidism 7/18/2017   • Arthritis    • HTN (hypertension)    • Hypertension    • Kidney stone    • Memory loss 10/4/2017   • Menopause 2/2/2015   • Migraine    • Obesity (BMI 30-39.9) 4/5/2018   • Polymyalgia rheumatica (HCC)         Past Surgical History:   Procedure Laterality Date   • EXPLORATORY LAPAROTOMY  7/3/2014    Performed by Danita Tolbert M.D. at SURGERY [facility]   • BOWEL RESECTION  7/3/2014    Performed by Danita Tolbert M.D. at SURGERY [facility]   • PB REVISE SECONDARY VARICOSITY      \"varicose veins operated on \"    • TONSILLECTOMY     • VAGINAL HYSTERECTOMY TOTAL          Family History   Problem Relation Age of Onset   • Heart Disease Mother    • Heart Attack Father    • Diabetes Brother    • Dementia Brother    • Diabetes Maternal Grandmother         Social History     Tobacco Use   Smoking Status Never Smoker   Smokeless Tobacco Never Used          Social History     Substance and Sexual Activity   Alcohol Use Yes   • " Frequency: Monthly or less   • Binge frequency: Never    Comment: small drink every 2-3 months         ---------------------------------------------------------------------     PHYSICAL EXAM:   Vitals:    10/12/20 1556   BP: 104/62   Pulse: 73   Resp: 14   Temp: 36.2 °C (97.2 °F)   SpO2: 95%      Body mass index is 37.42 kg/m².        Constitutional: no acute distress  Head normocephalic atraumatic.  PERRLA EOMI  Neck: supple, no JVD  CV: heart RRR  Resp: normal effort, no wheezing or rales.  GI: abdomen soft, no obvious mass, no tenderness  Neuro: CN 2-12 grossly intact  Right ankle small ecchymosis noted in the medial malleolus region.  Peripheral pulses present at dorsalis pedis posterior tibialis.  Mild pain noted palpation in this area.        ---------------------------------------------------------------------     ASSESSMENT and PLAN:  1. Acquired hypothyroidism  Chronic condition.  Lab tests ordered for follow-up.    2. Essential hypertension  Chronic stable condition continue with lisinopril.    3. Overactive bladder  Chronic stable condition continue with Detrol.    4. Prediabetes  Chronic stable condition advised the patient regarding diet and exercise.  Lab tests ordered for follow-up.    5. Other hyperlipidemia  Chronic condition.  Recommend lipid panel to be done.  Continue with atorvastatin.    6. Vitamin D deficiency  Lab tests ordered for follow-up.  - VITAMIN D,25 HYDROXY; Future    7. Dizziness  This is a new condition noted since her last several weeks.  Suspect possible benign positional vertigo.  Rule out other causes.  Her physical examination today is unremarkable.  Brain CT scan and carotid ultrasound requested.  Lab tests ordered  including CBC chemistry, TSH.  Advised the patient to go to urgent care or ER if symptoms worsen or change.        8. Nonintractable episodic headache, unspecified headache type  This is a new condition noted since last few weeks.  Currently patient  asymptomatic.  - CT-HEAD W/O; Future  Advised the patient to call for the results/follow-up after the imaging is done  9. Chronic pain of right ankle    - DX-ANKLE 3+ VIEWS RIGHT; Future  - REFERRAL TO PODIATRY            Return in about 6 months (around 4/12/2021) for high blood pressure followup.       PATIENT EDUCATION:  -If any problems should arise, patient was advised to contact our office or go to ER to be evaluated.  -Advised pt to follow a healthy diet and regular aerobic exercise regimen. Advised pt to avoid alcohol and tobacco use.    Please note that this dictation was created using voice recognition software. I have made every reasonable attempt to correct obvious errors, but it is possible there are errors of grammar and possibly content that I did not discover before finalizing the note.

## 2020-10-19 ENCOUNTER — TELEPHONE (OUTPATIENT)
Dept: MEDICAL GROUP | Facility: PHYSICIAN GROUP | Age: 80
End: 2020-10-19

## 2020-10-19 ENCOUNTER — HOSPITAL ENCOUNTER (OUTPATIENT)
Dept: RADIOLOGY | Facility: MEDICAL CENTER | Age: 80
End: 2020-10-19
Attending: INTERNAL MEDICINE
Payer: MEDICARE

## 2020-10-19 DIAGNOSIS — I65.23 BILATERAL CAROTID ARTERY STENOSIS: ICD-10-CM

## 2020-10-19 DIAGNOSIS — R42 DIZZINESS: ICD-10-CM

## 2020-10-19 PROBLEM — I65.29 CAROTID ARTERY STENOSIS: Status: ACTIVE | Noted: 2020-10-19

## 2020-10-19 PROCEDURE — 93880 EXTRACRANIAL BILAT STUDY: CPT

## 2020-10-19 NOTE — TELEPHONE ENCOUNTER
----- Message from Meir Thomason M.D. sent at 10/19/2020 11:49 AM PDT -----    Please call patient : recent carotid US showed  Less than 50% narrowing noted in both carotid arteries associated with plaques noted in the arteries.    Rec low fat low chol diet. It is important to keep bp in good control  Please repeat the carotid US in 1yr for f.u trends.

## 2020-10-21 ENCOUNTER — HOSPITAL ENCOUNTER (OUTPATIENT)
Dept: RADIOLOGY | Facility: MEDICAL CENTER | Age: 80
End: 2020-10-21
Attending: INTERNAL MEDICINE
Payer: MEDICARE

## 2020-10-21 ENCOUNTER — HOSPITAL ENCOUNTER (OUTPATIENT)
Dept: LAB | Facility: MEDICAL CENTER | Age: 80
End: 2020-10-21
Attending: INTERNAL MEDICINE
Payer: MEDICARE

## 2020-10-21 DIAGNOSIS — E55.9 VITAMIN D DEFICIENCY: ICD-10-CM

## 2020-10-21 DIAGNOSIS — E78.49 OTHER HYPERLIPIDEMIA: Chronic | ICD-10-CM

## 2020-10-21 DIAGNOSIS — R73.03 PREDIABETES: Chronic | ICD-10-CM

## 2020-10-21 DIAGNOSIS — R51.9 NONINTRACTABLE EPISODIC HEADACHE, UNSPECIFIED HEADACHE TYPE: ICD-10-CM

## 2020-10-21 DIAGNOSIS — I10 ESSENTIAL HYPERTENSION: Chronic | ICD-10-CM

## 2020-10-21 LAB
BASOPHILS # BLD AUTO: 0.3 % (ref 0–1.8)
BASOPHILS # BLD: 0.03 K/UL (ref 0–0.12)
EOSINOPHIL # BLD AUTO: 1.27 K/UL (ref 0–0.51)
EOSINOPHIL NFR BLD: 13.9 % (ref 0–6.9)
ERYTHROCYTE [DISTWIDTH] IN BLOOD BY AUTOMATED COUNT: 49.9 FL (ref 35.9–50)
HCT VFR BLD AUTO: 46.9 % (ref 37–47)
HGB BLD-MCNC: 14.7 G/DL (ref 12–16)
IMM GRANULOCYTES # BLD AUTO: 0.04 K/UL (ref 0–0.11)
IMM GRANULOCYTES NFR BLD AUTO: 0.4 % (ref 0–0.9)
LYMPHOCYTES # BLD AUTO: 3.07 K/UL (ref 1–4.8)
LYMPHOCYTES NFR BLD: 33.7 % (ref 22–41)
MCH RBC QN AUTO: 29.3 PG (ref 27–33)
MCHC RBC AUTO-ENTMCNC: 31.3 G/DL (ref 33.6–35)
MCV RBC AUTO: 93.6 FL (ref 81.4–97.8)
MONOCYTES # BLD AUTO: 0.51 K/UL (ref 0–0.85)
MONOCYTES NFR BLD AUTO: 5.6 % (ref 0–13.4)
NEUTROPHILS # BLD AUTO: 4.19 K/UL (ref 2–7.15)
NEUTROPHILS NFR BLD: 46.1 % (ref 44–72)
NRBC # BLD AUTO: 0 K/UL
NRBC BLD-RTO: 0 /100 WBC
PLATELET # BLD AUTO: 261 K/UL (ref 164–446)
PMV BLD AUTO: 10.3 FL (ref 9–12.9)
RBC # BLD AUTO: 5.01 M/UL (ref 4.2–5.4)
WBC # BLD AUTO: 9.1 K/UL (ref 4.8–10.8)

## 2020-10-21 PROCEDURE — 82570 ASSAY OF URINE CREATININE: CPT

## 2020-10-21 PROCEDURE — 84460 ALANINE AMINO (ALT) (SGPT): CPT

## 2020-10-21 PROCEDURE — 84443 ASSAY THYROID STIM HORMONE: CPT

## 2020-10-21 PROCEDURE — 70450 CT HEAD/BRAIN W/O DYE: CPT

## 2020-10-21 PROCEDURE — 80048 BASIC METABOLIC PNL TOTAL CA: CPT

## 2020-10-21 PROCEDURE — 82043 UR ALBUMIN QUANTITATIVE: CPT

## 2020-10-21 PROCEDURE — 82306 VITAMIN D 25 HYDROXY: CPT

## 2020-10-21 PROCEDURE — 80061 LIPID PANEL: CPT

## 2020-10-21 PROCEDURE — 85025 COMPLETE CBC W/AUTO DIFF WBC: CPT

## 2020-10-21 PROCEDURE — 36415 COLL VENOUS BLD VENIPUNCTURE: CPT

## 2020-10-22 LAB
25(OH)D3 SERPL-MCNC: 25 NG/ML (ref 30–100)
ALT SERPL-CCNC: 16 U/L (ref 2–50)
ANION GAP SERPL CALC-SCNC: 9 MMOL/L (ref 7–16)
BUN SERPL-MCNC: 16 MG/DL (ref 8–22)
CALCIUM SERPL-MCNC: 9.7 MG/DL (ref 8.5–10.5)
CHLORIDE SERPL-SCNC: 106 MMOL/L (ref 96–112)
CHOLEST SERPL-MCNC: 165 MG/DL (ref 100–199)
CO2 SERPL-SCNC: 24 MMOL/L (ref 20–33)
CREAT SERPL-MCNC: 0.82 MG/DL (ref 0.5–1.4)
CREAT UR-MCNC: 171.97 MG/DL
FASTING STATUS PATIENT QL REPORTED: NORMAL
GLUCOSE SERPL-MCNC: 103 MG/DL (ref 65–99)
HDLC SERPL-MCNC: 52 MG/DL
LDLC SERPL CALC-MCNC: 93 MG/DL
MICROALBUMIN UR-MCNC: <1.2 MG/DL
MICROALBUMIN/CREAT UR: NORMAL MG/G (ref 0–30)
POTASSIUM SERPL-SCNC: 4.3 MMOL/L (ref 3.6–5.5)
SODIUM SERPL-SCNC: 139 MMOL/L (ref 135–145)
TRIGL SERPL-MCNC: 102 MG/DL (ref 0–149)
TSH SERPL DL<=0.005 MIU/L-ACNC: 3.17 UIU/ML (ref 0.38–5.33)

## 2020-10-22 RX ORDER — ERGOCALCIFEROL 1.25 MG/1
50000 CAPSULE ORAL
Qty: 15 CAP | Refills: 3 | Status: SHIPPED | OUTPATIENT
Start: 2020-10-22 | End: 2022-06-07 | Stop reason: SDUPTHER

## 2020-11-10 DIAGNOSIS — I10 ESSENTIAL HYPERTENSION: ICD-10-CM

## 2020-11-12 RX ORDER — LISINOPRIL 30 MG/1
TABLET ORAL
Qty: 100 TAB | Refills: 1 | Status: SHIPPED | OUTPATIENT
Start: 2020-11-12 | End: 2021-08-10

## 2020-11-12 NOTE — TELEPHONE ENCOUNTER
Refill X 6 months, sent to pharmacy.Pt. Seen in the last 6 months per protocol.   Lab Results   Component Value Date/Time    SODIUM 139 10/21/2020 08:28 AM    POTASSIUM 4.3 10/21/2020 08:28 AM    CHLORIDE 106 10/21/2020 08:28 AM    CO2 24 10/21/2020 08:28 AM    GLUCOSE 103 (H) 10/21/2020 08:28 AM    BUN 16 10/21/2020 08:28 AM    CREATININE 0.82 10/21/2020 08:28 AM

## 2021-01-11 DIAGNOSIS — Z23 NEED FOR VACCINATION: ICD-10-CM

## 2021-03-12 DIAGNOSIS — R73.03 PREDIABETES: ICD-10-CM

## 2021-03-12 DIAGNOSIS — I10 ESSENTIAL HYPERTENSION: ICD-10-CM

## 2021-03-12 DIAGNOSIS — E78.49 OTHER HYPERLIPIDEMIA: ICD-10-CM

## 2021-03-16 RX ORDER — ATORVASTATIN CALCIUM 20 MG/1
TABLET, FILM COATED ORAL
Qty: 100 TABLET | Refills: 1 | Status: SHIPPED | OUTPATIENT
Start: 2021-03-16 | End: 2021-11-22 | Stop reason: SDUPTHER

## 2021-03-16 NOTE — TELEPHONE ENCOUNTER
Pt has had OV within the 12 month protocol and lipid panel is current. 6 month supply sent to pharmacy.   Lab Results   Component Value Date/Time    CHOLSTRLTOT 165 10/21/2020 08:28 AM    LDL 93 10/21/2020 08:28 AM    HDL 52 10/21/2020 08:28 AM    TRIGLYCERIDE 102 10/21/2020 08:28 AM       Lab Results   Component Value Date/Time    SODIUM 139 10/21/2020 08:28 AM    POTASSIUM 4.3 10/21/2020 08:28 AM    CHLORIDE 106 10/21/2020 08:28 AM    CO2 24 10/21/2020 08:28 AM    GLUCOSE 103 (H) 10/21/2020 08:28 AM    BUN 16 10/21/2020 08:28 AM    CREATININE 0.82 10/21/2020 08:28 AM     Lab Results   Component Value Date/Time    ALKPHOSPHAT 74 01/29/2020 12:18 PM    ASTSGOT 19 01/29/2020 12:18 PM    ALTSGPT 16 10/21/2020 08:28 AM    TBILIRUBIN 0.5 01/29/2020 12:18 PM

## 2021-03-22 ENCOUNTER — OFFICE VISIT (OUTPATIENT)
Dept: MEDICAL GROUP | Facility: PHYSICIAN GROUP | Age: 81
End: 2021-03-22
Payer: MEDICARE

## 2021-03-22 VITALS
HEIGHT: 64 IN | DIASTOLIC BLOOD PRESSURE: 48 MMHG | OXYGEN SATURATION: 97 % | WEIGHT: 221 LBS | RESPIRATION RATE: 14 BRPM | HEART RATE: 74 BPM | TEMPERATURE: 96.6 F | SYSTOLIC BLOOD PRESSURE: 112 MMHG | BODY MASS INDEX: 37.73 KG/M2

## 2021-03-22 DIAGNOSIS — E03.9 ACQUIRED HYPOTHYROIDISM: Chronic | ICD-10-CM

## 2021-03-22 DIAGNOSIS — N18.31 STAGE 3A CHRONIC KIDNEY DISEASE: Chronic | ICD-10-CM

## 2021-03-22 DIAGNOSIS — I10 ESSENTIAL HYPERTENSION: Chronic | ICD-10-CM

## 2021-03-22 DIAGNOSIS — N32.81 OVERACTIVE BLADDER: Chronic | ICD-10-CM

## 2021-03-22 DIAGNOSIS — E78.49 OTHER HYPERLIPIDEMIA: Chronic | ICD-10-CM

## 2021-03-22 DIAGNOSIS — R41.3 MEMORY DEFICIT: ICD-10-CM

## 2021-03-22 DIAGNOSIS — Z00.00 MEDICARE ANNUAL WELLNESS VISIT, SUBSEQUENT: Primary | ICD-10-CM

## 2021-03-22 DIAGNOSIS — R73.03 PREDIABETES: Chronic | ICD-10-CM

## 2021-03-22 DIAGNOSIS — Z23 NEED FOR VACCINATION: ICD-10-CM

## 2021-03-22 DIAGNOSIS — Z13.29 SCREENING FOR ENDOCRINE DISORDER: ICD-10-CM

## 2021-03-22 DIAGNOSIS — Z78.0 POSTMENOPAUSAL STATUS (AGE-RELATED) (NATURAL): ICD-10-CM

## 2021-03-22 PROCEDURE — 8041 PR SCP AHA: Performed by: INTERNAL MEDICINE

## 2021-03-22 PROCEDURE — G0439 PPPS, SUBSEQ VISIT: HCPCS | Performed by: INTERNAL MEDICINE

## 2021-03-22 ASSESSMENT — ENCOUNTER SYMPTOMS: GENERAL WELL-BEING: GOOD

## 2021-03-22 ASSESSMENT — FIBROSIS 4 INDEX: FIB4 SCORE: 1.49

## 2021-03-22 ASSESSMENT — PATIENT HEALTH QUESTIONNAIRE - PHQ9: CLINICAL INTERPRETATION OF PHQ2 SCORE: 0

## 2021-03-22 ASSESSMENT — ACTIVITIES OF DAILY LIVING (ADL): BATHING_REQUIRES_ASSISTANCE: 0

## 2021-03-22 NOTE — LETTER
[facility]  Meir Thomason M.D.  [address]  [place] [address]  Fax: [phone]   Authorization for Release/Disclosure of   Protected Health Information   Name: GUERRERO HOOPER : 1940 SSN: [id number]   Address: [address] [address] Phone:    [phone] (home)    I authorize the entity listed below to release/disclose the PHI below to:   [facility]/Meir Thomason M.D. and Meir Thomason M.D.   Provider or Entity Name:Claudio Barcenas O.D.   Address   [facility], [facility], [facility]   Phone:      Fax:[phone]   Reason for request: continuity of care   Information to be released:    [  ] LAST COLONOSCOPY,  including any PATH REPORT and follow-up  [  ] LAST FIT/COLOGUARD RESULT [  ] LAST DEXA  [  ] LAST MAMMOGRAM  [  ] LAST PAP  [  ] LAST LABS [ XXX ] RETINA EXAM REPORT  [  ] IMMUNIZATION RECORDS  [  ] Release all info      [  ] Check here and initial the line next to each item to release ALL health information INCLUDING  _____ Care and treatment for drug and / or alcohol abuse  _____ HIV testing, infection status, or AIDS  _____ Genetic Testing    DATES OF SERVICE OR TIME PERIOD TO BE DISCLOSED: _____________  I understand and acknowledge that:  * This Authorization may be revoked at any time by you in writing, except if your health information has already been used or disclosed.  * Your health information that will be used or disclosed as a result of you signing this authorization could be re-disclosed by the recipient. If this occurs, your re-disclosed health information may no longer be protected by State or Federal laws.  * You may refuse to sign this Authorization. Your refusal will not affect your ability to obtain treatment.  * This Authorization becomes effective upon signing and will  on (date) __________.      If no date is indicated, this Authorization will  one (1) year from the signature date.    Name: Guerrero Hooper    Signature:   Date:        3/22/2021       PLEASE FAX REQUESTED RECORDS BACK TO: [phone]

## 2021-03-22 NOTE — ASSESSMENT & PLAN NOTE
This is a chronic condition.  Patient presently taking levothyroxine.  Patient is due for blood test.

## 2021-03-22 NOTE — ASSESSMENT & PLAN NOTE
Chronic condition.  The patient presently taking amlodipine and lisinopril.  No significant side effects reported.

## 2021-03-22 NOTE — ASSESSMENT & PLAN NOTE
Chronic condition  Sx noted > 1year   noted by family members franck short term memory    Denies H.o head trauma or injury  No FH of dementia.  Pt stated she has to write everything down .

## 2021-03-22 NOTE — PROGRESS NOTES
Chief Complaint   Patient presents with   • Annual Wellness Visit         HPI:  Katharine is a 80 y.o. here for Medicare Annual Wellness Visit    Pt is here today with her daughter.        Patient Active Problem List    Diagnosis Date Noted   • Memory deficit 03/22/2021   • Carotid artery stenosis 10/19/2020   • Dizziness 10/12/2020   • Right ankle pain 10/12/2020   • CKD (chronic kidney disease) stage 3, GFR 30-59 ml/min 02/05/2020   • Prediabetes 02/05/2020   • Other hyperlipidemia 02/05/2020   • BMI 37.0-37.9, adult 01/08/2020   • Hernia of abdominal wall 10/10/2018   • Overactive bladder 09/20/2018   • Acquired hypothyroidism 07/18/2017   • Vitamin D deficiency 07/18/2017   • HTN (hypertension)        Current Outpatient Medications   Medication Sig Dispense Refill   • atorvastatin (LIPITOR) 20 MG Tab TAKE 1 TABLET BY MOUTH EVERYDAY AT BEDTIME 100 tablet 1   • lisinopril (PRINIVIL) 30 MG tablet TAKE 1 TABLET BY MOUTH EVERY  Tab 1   • ergocalciferol (DRISDOL) 25536 UNIT capsule Take 1 Cap by mouth every 7 days. 15 Cap 3   • meclizine (ANTIVERT) 12.5 MG Tab Take 1 Tab by mouth 2 times daily with meals as needed. 60 Tab 1   • levothyroxine (SYNTHROID) 25 MCG Tab TAKE 1 TAB BY MOUTH EVERY MORNING ON AN EMPTY STOMACH. 90 Tab 2   • amLODIPine (NORVASC) 5 MG Tab TAKE 1 TABLET BY MOUTH EVERY DAY 90 Tab 1   • tolterodine ER (DETROL LA) 4 MG CAPSULE SR 24 HR Take 4 mg by mouth every day.     • aspirin 81 MG tablet Take 81 mg by mouth every day.       No current facility-administered medications for this visit.        Patient is taking medications as noted in medication list.  Current supplements as per medication list.     Allergies: Patient has no known allergies.    Current social contact/activities: Pt states she likes to play cards and she likes to watch tv. Pt states she spends time with her daughters and she has a friend she goes out to lunch 1-2 times monthly.       Is patient current with immunizations? No,  due for FLU, SHINGRIX (Shingles) and Covid 19. Patient is interested in receiving FLU today.    She  reports that she has never smoked. She has never used smokeless tobacco. She reports current alcohol use. She reports that she does not use drugs.  Counseling given: Not Answered        DPA/Advanced directive: Patient has Advanced Directive on file.     ROS:    Gait: Uses a cane   Ostomy: No   Other tubes: No   Amputations: No   Chronic oxygen use No   Last eye exam Pt states 6 months ago   Wears hearing aids: No   : Denies any urinary leakage during the last 6 months      Screening:        Depression Screening    Little interest or pleasure in doing things?  0 - not at all  Feeling down, depressed, or hopeless? 0 - not at all  Patient Health Questionnaire Score: 0    If depressive symptoms identified deferred to follow up visit unless specifically addressed in assessment and plan.    Interpretation of PHQ-9 Total Score   Score Severity   1-4 No Depression   5-9 Mild Depression   10-14 Moderate Depression   15-19 Moderately Severe Depression   20-27 Severe Depression    Screening for Cognitive Impairment    Three Minute Recall (river, jeremy, finger)  0/3    Burak clock face with all 12 numbers and set the hands to show 10 past 11.  No    If cognitive concerns identified, deferred for follow up unless specifically addressed in assessment and plan.    Fall Risk Assessment    Has the patient had two or more falls in the last year or any fall with injury in the last year?     If fall risk identified, deferred for follow up unless specifically addressed in assessment and plan.    Safety Assessment    Throw rugs on floor.  Yes  Handrails on all stairs.  No  Good lighting in all hallways.  Yes  Difficulty hearing.  No  Patient counseled about all safety risks that were identified.    Functional Assessment ADLs    Are there any barriers preventing you from cooking for yourself or meeting nutritional needs?  No.    Are  there any barriers preventing you from driving safely or obtaining transportation?  Yes. Pt states she is uncomfortable with driving so she relies on her daughters.  Are there any barriers preventing you from using a telephone or calling for help?  No.    Are there any barriers preventing you from shopping?  No.    Are there any barriers preventing you from taking care of your own finances?  No.    Are there any barriers preventing you from managing your medications?  No.    Are there any barriers preventing you from showering, bathing or dressing yourself?  No.    Are you currently engaging in any exercise or physical activity?  No.     What is your perception of your health?  Good.    Health Maintenance Summary                COVID-19 Vaccine Overdue 12/8/1956     IMM ZOSTER VACCINES Overdue 12/8/1988     Annual Wellness Visit Overdue 4/6/2019      Done 4/5/2018     BONE DENSITY Overdue 12/18/2019      Done 12/18/2014 DS-BONE DENSITY STUDY (DEXA)    IMM INFLUENZA Overdue 9/1/2020      Done 10/5/2014 Imm Admin: Influenza Vaccine Adult HD    IMM DTaP/Tdap/Td Vaccine Next Due 10/11/2027      Done 10/11/2017 Imm Admin: Tdap Vaccine          Patient Care Team:  Meir Thomason M.D. as PCP - General (Internal Medicine)  Claudio Barcenas O.D. as Consulting Physician (Optometry)  Danita Tolbert M.D. (Surgery)  Chirag Cooley M.D. as Consulting Physician (Urology)  Fady Mckeon Ass't (Inactive) as    Chuck Golden M.D. as Consulting Physician (Sports Medicine)    Social History     Tobacco Use   • Smoking status: Never Smoker   • Smokeless tobacco: Never Used   Substance Use Topics   • Alcohol use: Yes     Comment: small drink every 2-3 months    • Drug use: No     Family History   Problem Relation Age of Onset   • Heart Disease Mother    • Heart Attack Father    • Diabetes Brother    • Dementia Brother    • Diabetes Maternal Grandmother      She  has a past medical history of  "Acquired hypothyroidism (7/18/2017), Arthritis, Carotid artery stenosis (10/19/2020), HTN (hypertension), Hypertension, Kidney stone, Memory loss (10/4/2017), Menopause (2/2/2015), Migraine, Obesity (BMI 30-39.9) (4/5/2018), and Polymyalgia rheumatica (HCC). She also has no past medical history of Anxiety, Arrhythmia, Asthma, Blood transfusion without reported diagnosis, Cancer (HCC), CHF (congestive heart failure) (HCC), Clotting disorder (HCC), COPD (chronic obstructive pulmonary disease) (HCC), Depression, Diabetes (HCC), GERD (gastroesophageal reflux disease), Heart attack (HCC), IBD (inflammatory bowel disease), Seizure ([facility]), or Urinary tract infection.   Past Surgical History:   Procedure Laterality Date   • EXPLORATORY LAPAROTOMY  7/3/2014    Performed by Danita Tolbert M.D. at SURGERY [facility]   • BOWEL RESECTION  7/3/2014    Performed by Danita Tolbert M.D. at SURGERY [facility]   • PB REVISE SECONDARY VARICOSITY      \"varicose veins operated on \"    • TONSILLECTOMY     • VAGINAL HYSTERECTOMY TOTAL             Exam:     /48   Pulse 74   Temp 35.9 °C (96.6 °F)   Resp 14   Ht 1.626 m (5' 4\")   Wt 100 kg (221 lb)   SpO2 97%  Body mass index is 37.93 kg/m².    Hearing fair.    Dentition fair  Alert, oriented in no acute distress.  Eye contact is good, speech goal directed, affect calm      Assessment and Plan. The following treatment and monitoring plan is recommended:    1. Need for vaccination  Influenza Vaccine Quad Injection (PF)   2. Postmenopausal status (age-related) (natural)  DS-BONE DENSITY STUDY (DEXA)   3. Acquired hypothyroidism     4. Stage 3a chronic kidney disease     5. Essential hypertension     6. Other hyperlipidemia  ALANINE AMINO-TRANS    Lipid Profile   7. Overactive bladder     8. Prediabetes  HEMOGLOBIN A1C    Basic Metabolic Panel   9. Screening for endocrine disorder  TSH    MICROALBUMIN CREAT RATIO URINE    VITAMIN D,25 HYDROXY   10. Memory deficit  CT-HEAD " W/O    REFERRAL TO NEUROLOGY    VITAMIN B12     Acquired hypothyroidism  This is a chronic condition.  Patient presently taking levothyroxine.  Patient is due for blood test.    CKD (chronic kidney disease) stage 3, GFR 30-59 ml/min  Chronic condition.  Previous GFR in the 50s.  Lab tests ordered for follow-up.  Advised the patient to avoid NSAIDs.    HTN (hypertension)  Chronic condition.  The patient presently taking amlodipine and lisinopril.  No significant side effects reported.    Other hyperlipidemia  Chronic condition.  Patient is now on Lipitor.  Patient is due for blood test.    Overactive bladder  Chronic stable condition.  The patient is currently taking Detrol.    Prediabetes  Chronic condition.  The patient is currently on diet therapy.  Lab tests ordered for follow-up.    Memory deficit  Chronic condition  Sx noted > 1year   noted by family members franck short term memory    Denies H.o head trauma or injury  No FH of dementia.  Pt stated she has to write everything down .  Referred pt to Neurology. brainCT ordered    Health maintenance: We discussed COVID-19 vaccine.  The patient is still undecided.    Discussed with patient and daughter regarding shingle vaccine patient would like to postpone.  We also discussed influenza vaccination.  We will hold off on the influenza vaccine as the patient is still contemplating about the COVID-19 vaccine      Health Care Screening recommendations as per orders if indicated.  Referrals offered: PT/OT/Nutrition counseling/Behavioral Health/Smoking cessation as per orders if indicated.    Discussion today about general wellness and lifestyle habits:    · Prevent falls and reduce trip hazards; Cautioned about securing or removing rugs.  · Have a working fire alarm and carbon monoxide detector;   · Engage in regular physical activity and social activities       Follow-up: Return in about 6 months (around 9/22/2021).

## 2021-03-22 NOTE — ASSESSMENT & PLAN NOTE
Chronic condition.  Previous GFR in the 50s.  Lab tests ordered for follow-up.  Advised the patient to avoid NSAIDs.

## 2021-04-01 ENCOUNTER — HOSPITAL ENCOUNTER (OUTPATIENT)
Dept: RADIOLOGY | Facility: MEDICAL CENTER | Age: 81
End: 2021-04-01
Attending: INTERNAL MEDICINE
Payer: MEDICARE

## 2021-04-01 ENCOUNTER — TELEPHONE (OUTPATIENT)
Dept: MEDICAL GROUP | Facility: PHYSICIAN GROUP | Age: 81
End: 2021-04-01

## 2021-04-01 DIAGNOSIS — R41.3 MEMORY DEFICIT: ICD-10-CM

## 2021-04-01 PROCEDURE — 70450 CT HEAD/BRAIN W/O DYE: CPT | Mod: MH

## 2021-04-01 PROCEDURE — 77080 DXA BONE DENSITY AXIAL: CPT

## 2021-04-01 NOTE — TELEPHONE ENCOUNTER
----- Message from Katharina Chen P.A.-C. sent at 4/1/2021  3:02 PM PDT -----  Please call the patient and let her know that I am covering Dr. Thomason's in basket while he is out of the office.  I have reviewed her recent CT of her head which showed no evidence of bleeding or white matter disease.  There was some mild age-appropriate atrophy but overall it is an unremarkable CT of her head.  Please let us know if she has any questions or concerns or she can feel free to make an appointment with Dr. Thomason to discuss this further.    Thank you,  Katharina Chen PA-C

## 2021-04-01 NOTE — TELEPHONE ENCOUNTER
Spoke with Katharine and relayed recent labs results via PCP. I also sent information to pt's mychart for reference. Pt thanked me for the call.

## 2021-04-19 DIAGNOSIS — I10 ESSENTIAL HYPERTENSION: ICD-10-CM

## 2021-04-20 ENCOUNTER — OFFICE VISIT (OUTPATIENT)
Dept: MEDICAL GROUP | Facility: PHYSICIAN GROUP | Age: 81
End: 2021-04-20
Payer: MEDICARE

## 2021-04-20 ENCOUNTER — HOSPITAL ENCOUNTER (OUTPATIENT)
Dept: LAB | Facility: MEDICAL CENTER | Age: 81
End: 2021-04-20
Attending: INTERNAL MEDICINE
Payer: MEDICARE

## 2021-04-20 VITALS
RESPIRATION RATE: 16 BRPM | WEIGHT: 220 LBS | TEMPERATURE: 97.8 F | HEART RATE: 78 BPM | OXYGEN SATURATION: 94 % | DIASTOLIC BLOOD PRESSURE: 70 MMHG | HEIGHT: 64 IN | BODY MASS INDEX: 37.56 KG/M2 | SYSTOLIC BLOOD PRESSURE: 134 MMHG

## 2021-04-20 DIAGNOSIS — N18.31 STAGE 3A CHRONIC KIDNEY DISEASE: Chronic | ICD-10-CM

## 2021-04-20 DIAGNOSIS — R41.3 MEMORY DEFICIT: ICD-10-CM

## 2021-04-20 DIAGNOSIS — E78.49 OTHER HYPERLIPIDEMIA: Chronic | ICD-10-CM

## 2021-04-20 DIAGNOSIS — E03.9 ACQUIRED HYPOTHYROIDISM: Chronic | ICD-10-CM

## 2021-04-20 DIAGNOSIS — I10 ESSENTIAL HYPERTENSION: Chronic | ICD-10-CM

## 2021-04-20 DIAGNOSIS — Z13.29 SCREENING FOR ENDOCRINE DISORDER: ICD-10-CM

## 2021-04-20 DIAGNOSIS — R73.03 PREDIABETES: Chronic | ICD-10-CM

## 2021-04-20 LAB
ALT SERPL-CCNC: 18 U/L (ref 2–50)
ANION GAP SERPL CALC-SCNC: 9 MMOL/L (ref 7–16)
BUN SERPL-MCNC: 19 MG/DL (ref 8–22)
CALCIUM SERPL-MCNC: 10.2 MG/DL (ref 8.5–10.5)
CHLORIDE SERPL-SCNC: 105 MMOL/L (ref 96–112)
CHOLEST SERPL-MCNC: 189 MG/DL (ref 100–199)
CO2 SERPL-SCNC: 26 MMOL/L (ref 20–33)
CREAT SERPL-MCNC: 0.87 MG/DL (ref 0.5–1.4)
CREAT UR-MCNC: 153.73 MG/DL
EST. AVERAGE GLUCOSE BLD GHB EST-MCNC: 126 MG/DL
FASTING STATUS PATIENT QL REPORTED: NORMAL
GLUCOSE SERPL-MCNC: 84 MG/DL (ref 65–99)
HBA1C MFR BLD: 6 % (ref 4–5.6)
HDLC SERPL-MCNC: 49 MG/DL
LDLC SERPL CALC-MCNC: 121 MG/DL
MICROALBUMIN UR-MCNC: <1.2 MG/DL
MICROALBUMIN/CREAT UR: NORMAL MG/G (ref 0–30)
POTASSIUM SERPL-SCNC: 4.3 MMOL/L (ref 3.6–5.5)
SODIUM SERPL-SCNC: 140 MMOL/L (ref 135–145)
TRIGL SERPL-MCNC: 94 MG/DL (ref 0–149)
TSH SERPL DL<=0.005 MIU/L-ACNC: 4.22 UIU/ML (ref 0.38–5.33)
VIT B12 SERPL-MCNC: 366 PG/ML (ref 211–911)

## 2021-04-20 PROCEDURE — 82306 VITAMIN D 25 HYDROXY: CPT

## 2021-04-20 PROCEDURE — 83036 HEMOGLOBIN GLYCOSYLATED A1C: CPT

## 2021-04-20 PROCEDURE — 99214 OFFICE O/P EST MOD 30 MIN: CPT | Performed by: INTERNAL MEDICINE

## 2021-04-20 PROCEDURE — 36415 COLL VENOUS BLD VENIPUNCTURE: CPT

## 2021-04-20 PROCEDURE — 82570 ASSAY OF URINE CREATININE: CPT

## 2021-04-20 PROCEDURE — 84443 ASSAY THYROID STIM HORMONE: CPT

## 2021-04-20 PROCEDURE — 82607 VITAMIN B-12: CPT

## 2021-04-20 PROCEDURE — 82043 UR ALBUMIN QUANTITATIVE: CPT

## 2021-04-20 PROCEDURE — 84460 ALANINE AMINO (ALT) (SGPT): CPT

## 2021-04-20 PROCEDURE — 80061 LIPID PANEL: CPT

## 2021-04-20 PROCEDURE — 8041 PR SCP AHA: Performed by: INTERNAL MEDICINE

## 2021-04-20 PROCEDURE — 80048 BASIC METABOLIC PNL TOTAL CA: CPT

## 2021-04-20 RX ORDER — AMLODIPINE BESYLATE 5 MG/1
TABLET ORAL
Qty: 90 TABLET | Refills: 1 | Status: SHIPPED | OUTPATIENT
Start: 2021-04-20 | End: 2021-11-22 | Stop reason: SDUPTHER

## 2021-04-20 ASSESSMENT — FIBROSIS 4 INDEX: FIB4 SCORE: 1.41

## 2021-04-20 NOTE — TELEPHONE ENCOUNTER
Refill X 6 months, sent to pharmacy.Pt. Seen in the last 6 months per protocol.   Lab Results   Component Value Date/Time    SODIUM 140 04/20/2021 09:03 AM    POTASSIUM 4.3 04/20/2021 09:03 AM    CHLORIDE 105 04/20/2021 09:03 AM    CO2 26 04/20/2021 09:03 AM    GLUCOSE 84 04/20/2021 09:03 AM    BUN 19 04/20/2021 09:03 AM    CREATININE 0.87 04/20/2021 09:03 AM

## 2021-04-20 NOTE — NON-PROVIDER
Annual Health Assessment Questions:    1.  Are you currently engaging in any exercise or physical activity? No    2.  How would you describe your mood or emotional well-being today? good    3.  Have you had any falls in the last year? No    4.  Have you noticed any problems with your balance or had difficulty walking? Yes    5.  In the last six months have you experienced any leakage of urine? No    6. DPA/Advanced Directive: Patient has Advanced Directive on file.

## 2021-04-20 NOTE — ASSESSMENT & PLAN NOTE
Patient is taking atorvastatin.  Recent lab test show a higher LDL level  Result discussed with the patient and her daughters who accompanied the patient today    Results for GUERRERO HOOPER (MRN [id number]) as of 4/20/2021 16:21   Ref. Range 4/20/2021 09:03   Sodium Latest Ref Range: 135 - 145 mmol/L 140   Potassium Latest Ref Range: 3.6 - 5.5 mmol/L 4.3   Chloride Latest Ref Range: 96 - 112 mmol/L 105   Co2 Latest Ref Range: 20 - 33 mmol/L 26   Anion Gap Latest Ref Range: 7.0 - 16.0  9.0   Glucose Latest Ref Range: 65 - 99 mg/dL 84   Bun Latest Ref Range: 8 - 22 mg/dL 19   Creatinine Latest Ref Range: 0.50 - 1.40 mg/dL 0.87   GFR If  Latest Ref Range: >60 mL/min/1.73 m 2 >60   GFR If Non  Latest Ref Range: >60 mL/min/1.73 m 2 >60   Calcium Latest Ref Range: 8.5 - 10.5 mg/dL 10.2   ALT(SGPT) Latest Ref Range: 2 - 50 U/L 18   Fasting Status Unknown Fasting   Cholesterol,Tot Latest Ref Range: 100 - 199 mg/dL 189   Triglycerides Latest Ref Range: 0 - 149 mg/dL 94   HDL Latest Ref Range: >=40 mg/dL 49   LDL Latest Ref Range: <100 mg/dL 121 (H)   Discussed with the patient regarding the option to increase the dose of atorvastatin however the patient wishes to work on her diet and exercise and the patient will try to lose some weight.  Recommend to repeat another lipid panel for follow-up proximately 6 months.

## 2021-04-20 NOTE — PROGRESS NOTES
CC: Follow-up blood pressure  Discuss CT scan result      HPI: This is a 80 y.o. pt.  Pt's medical history is notable for:     Acquired hypothyroidism  Chronic condition.  The patient is presently taking Synthroid.  No significant side effects reported.    CKD (chronic kidney disease) stage 3, GFR 30-59 ml/min  Previous GFR was noted to be in the 50s.  January 2020.  Recent lab test show improve GFR above 60.    HTN (hypertension)  Patient currently taking lisinopril and amlodipine.  Blood pressure has been well controlled.  It is 134/70 today.    Other hyperlipidemia  Patient is taking atorvastatin.  Recent lab test show a higher LDL level  Result discussed with the patient and her daughters who accompanied the patient today    Results for GUERRERO HOOPER (MRN [id number]) as of 4/20/2021 16:21   Ref. Range 4/20/2021 09:03   Sodium Latest Ref Range: 135 - 145 mmol/L 140   Potassium Latest Ref Range: 3.6 - 5.5 mmol/L 4.3   Chloride Latest Ref Range: 96 - 112 mmol/L 105   Co2 Latest Ref Range: 20 - 33 mmol/L 26   Anion Gap Latest Ref Range: 7.0 - 16.0  9.0   Glucose Latest Ref Range: 65 - 99 mg/dL 84   Bun Latest Ref Range: 8 - 22 mg/dL 19   Creatinine Latest Ref Range: 0.50 - 1.40 mg/dL 0.87   GFR If  Latest Ref Range: >60 mL/min/1.73 m 2 >60   GFR If Non  Latest Ref Range: >60 mL/min/1.73 m 2 >60   Calcium Latest Ref Range: 8.5 - 10.5 mg/dL 10.2   ALT(SGPT) Latest Ref Range: 2 - 50 U/L 18   Fasting Status Unknown Fasting   Cholesterol,Tot Latest Ref Range: 100 - 199 mg/dL 189   Triglycerides Latest Ref Range: 0 - 149 mg/dL 94   HDL Latest Ref Range: >=40 mg/dL 49   LDL Latest Ref Range: <100 mg/dL 121 (H)   Discussed with the patient regarding the option to increase the dose of atorvastatin however the patient wishes to work on her diet and exercise and the patient will try to lose some weight.  Recommend to repeat another lipid panel for follow-up proximately 6  months.    Memory deficit  Chronic condition.  Recent CT scan showed     No noncontrast CT evidence of acute intracranial hemorrhage or significant white matter disease     Mild age-appropriate atrophy        Patient has pending appointment to see neurologist next month.  Advised to keep the appointment.  The result of the CT discussed with the patient and  2daughters today also.          REVIEW OF SYSTEMS:     Constitutional:  no fever / chills   Neurologic: no headaches  Eyes: no changes in vision  ENT: no sore throat, no hearing loss  CV:  no chest pain, no palpitations  Pulmonary: no SOB, no cough          Allergies: Patient has no known allergies.    Current Outpatient Medications Ordered in Epic   Medication Sig Dispense Refill   • atorvastatin (LIPITOR) 20 MG Tab TAKE 1 TABLET BY MOUTH EVERYDAY AT BEDTIME 100 tablet 1   • lisinopril (PRINIVIL) 30 MG tablet TAKE 1 TABLET BY MOUTH EVERY  Tab 1   • ergocalciferol (DRISDOL) 56629 UNIT capsule Take 1 Cap by mouth every 7 days. 15 Cap 3   • meclizine (ANTIVERT) 12.5 MG Tab Take 1 Tab by mouth 2 times daily with meals as needed. 60 Tab 1   • levothyroxine (SYNTHROID) 25 MCG Tab TAKE 1 TAB BY MOUTH EVERY MORNING ON AN EMPTY STOMACH. 90 Tab 2   • tolterodine ER (DETROL LA) 4 MG CAPSULE SR 24 HR Take 4 mg by mouth every day.     • aspirin 81 MG tablet Take 81 mg by mouth every day.     • amLODIPine (NORVASC) 5 MG Tab TAKE 1 TABLET BY MOUTH EVERY DAY 90 tablet 1     No current Epic-ordered facility-administered medications on file.       Past Medical, Social, and Family history reviewed and updated in EPIC     ------------------------------------------------------------------------------     PHYSICAL EXAM:   Vitals:    04/20/21 1602   BP: 134/70   Pulse: 78   Resp: 16   Temp: 36.6 °C (97.8 °F)   SpO2: 94%      Body mass index is 37.76 kg/m².         Constitutional: no acute distress  Neck: supple, no JVD  CV: heart RRR  Resp: normal effort, no wheezing or  rales.  GI: abdomen soft, no obvious mass, no tenderness  Neuro: CN 2-12 grossly intact        -----------------------------------------------------------------------------    ASSESSMENT:   1. Acquired hypothyroidism     2. Stage 3a chronic kidney disease     3. Essential hypertension     4. Other hyperlipidemia     5. Memory deficit             MEDICAL DECISION MAKING: DISCUSSION / STATUS / PLAN:    Overall the patient is stable  Advised to continue with current medications  Recent lab test result and CT scan discussed with the patient in detail.  Regarding the memory deficit we have discussed possibility to use medication however the patient would like to discuss further with neurologist next month.  Recommend blood test to be done prior to next appointment.     Return in about 6 months (around 10/20/2021).       PATIENT EDUCATION:  -If any problems should arise, patient was advised to contact our office or go to ER to be evaluated.      Please note that this dictation was created using voice recognition software. I have made every reasonable attempt to correct obvious errors, but it is possible there are errors of grammar and possibly content that I did not discover before finalizing the note.

## 2021-04-20 NOTE — ASSESSMENT & PLAN NOTE
Chronic condition.  Recent CT scan showed     No noncontrast CT evidence of acute intracranial hemorrhage or significant white matter disease     Mild age-appropriate atrophy        Patient has pending appointment to see neurologist next month.  Advised to keep the appointment.  The result of the CT discussed with the patient and  2daughters today also.

## 2021-04-20 NOTE — ASSESSMENT & PLAN NOTE
Chronic condition.  The patient is presently taking Synthroid.  No significant side effects reported.

## 2021-04-20 NOTE — ASSESSMENT & PLAN NOTE
Patient currently taking lisinopril and amlodipine.  Blood pressure has been well controlled.  It is 134/70 today.

## 2021-04-20 NOTE — ASSESSMENT & PLAN NOTE
Previous GFR was noted to be in the 50s.  January 2020.  Recent lab test show improve GFR above 60.

## 2021-04-21 LAB — 25(OH)D3 SERPL-MCNC: 25 NG/ML (ref 30–80)

## 2021-04-27 DIAGNOSIS — E03.9 ACQUIRED HYPOTHYROIDISM: ICD-10-CM

## 2021-04-29 RX ORDER — MECLIZINE HCL 12.5 MG/1
12.5 TABLET ORAL
Qty: 60 TABLET | Refills: 1 | Status: SHIPPED | OUTPATIENT
Start: 2021-04-29 | End: 2021-08-26

## 2021-04-29 RX ORDER — LEVOTHYROXINE SODIUM 0.03 MG/1
TABLET ORAL
Qty: 90 TABLET | Refills: 3 | Status: SHIPPED | OUTPATIENT
Start: 2021-04-29 | End: 2021-11-22 | Stop reason: SDUPTHER

## 2021-05-05 ENCOUNTER — TELEPHONE (OUTPATIENT)
Dept: MEDICAL GROUP | Facility: PHYSICIAN GROUP | Age: 81
End: 2021-05-05

## 2021-05-05 DIAGNOSIS — K76.89 HEPATIC CYST: ICD-10-CM

## 2021-05-05 NOTE — TELEPHONE ENCOUNTER
----- Message from Meir Thomason M.D. sent at 5/5/2021  7:41 AM PDT -----  Regarding: FW: Procedure Question  Contact: [phone]  Pls refer to note below    I have submitted an URGENT referral for pt to be seen by General Surgery  for further evaluation.  Please call [phone] and ask for Referral Department.    Pls call and notify pt  If pain severe>> pls go to [facility] ER    thanks       ----- Message -----  From: Saud Figueroa Med Ass't  Sent: 5/5/2021   7:06 AM PDT  To: Meir Thomason M.D.  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  [facility] discharged her home today.  Wee are having a problem getting a surgeon to do the surgery. Dr Burnham apparently is the dr that should do it.  She is in extreme pain.  She is a patient of Dr Tolbert, but she does not do livers.  What do we do!?

## 2021-05-07 DIAGNOSIS — K76.89 HEPATIC CYST: ICD-10-CM

## 2021-05-12 ENCOUNTER — TELEPHONE (OUTPATIENT)
Dept: MEDICAL GROUP | Facility: PHYSICIAN GROUP | Age: 81
End: 2021-05-12

## 2021-05-12 DIAGNOSIS — K76.89 HEPATIC CYST: ICD-10-CM

## 2021-05-12 DIAGNOSIS — G89.4 CHRONIC PAIN SYNDROME: ICD-10-CM

## 2021-05-12 DIAGNOSIS — G89.29 OTHER CHRONIC PAIN: ICD-10-CM

## 2021-05-12 RX ORDER — HYDROCODONE BITARTRATE AND ACETAMINOPHEN 5; 325 MG/1; MG/1
1 TABLET ORAL EVERY 8 HOURS PRN
Qty: 42 TABLET | Refills: 0 | Status: SHIPPED | OUTPATIENT
Start: 2021-05-12 | End: 2021-05-26

## 2021-05-12 NOTE — TELEPHONE ENCOUNTER
----- Message from Meir Thomason M.D. sent at 5/12/2021 10:30 AM PDT -----  Regarding: FW: Prescription Question  Contact: [phone]  PLS see note below and call pt/daughter    We have sent rx to pharm for Hydrocodone. Pt may take 1 tab q8hrs prn for severe pain  Pls advise pt that this med does contain tylenol which may affect Liver condition so pls try to take this med only for severe pain    Recommendation: A referral has been submitted to [facility]  for further evaluation.  Please call [phone] and ask for Referral Department.       ----- Message -----  From: Fady Mejia Ass't  Sent: 5/12/2021   7:59 AM PDT  To: Meir Thomason M.D.  Subject: FW: Prescription Question                          ----- Message -----  From: Katharine Gee  Sent: 5/11/2021   4:54 PM PDT  To: [facility]  Subject: Prescription Question                            When my mom was discharged from er, the sent her home with 5 days of Tramodol  50mg.  These are not working for the pain.  She saw Dr. Anderson today, he said to ask you for pain medication.  As we don't know still what the surgeon is going to decide, she needs something else for the pain.    Thanks  Yolande Miramontes

## 2021-05-24 ENCOUNTER — HOSPITAL ENCOUNTER (OUTPATIENT)
Dept: RADIOLOGY | Facility: MEDICAL CENTER | Age: 81
End: 2021-05-24
Payer: MEDICARE

## 2021-05-24 NOTE — PROGRESS NOTES
Subjective:   5/25/2021  9:42 AM  Primary care physician:Meir Thomason M.D.  Referring Provider: Sammy Devine MD         Chief Complaint:   Chief Complaint   Patient presents with   • New Patient     LARGE HEPATIC CYST REF DR DEVINE      Diagnosis:   1. Hepatic cyst     2. Hernia of abdominal wall     3. Abnormal CT of the abdomen     4. Abdominal pain, unspecified abdominal location         History of presenting illness:  Katharine Gee  is a pleasant 80 y.o. year old female who presented with what appears to be in a large cyst in the right lobe of the liver.  The cyst appears to involve both right and left lobes of the liver.  She has had a bit of a complicated referral pattern to see me but the patient was admitted to [facility] approximately month ago and was seen by Dr. Barker who contacted me.  We plan to do a HIDA scan to confirm that there was no filling of the cyst and possibly set her up for repair of hernia and cystectomy.  Unfortunately on CT scan the patient was found to have loss of domain of her lower abdomen.  She actually has been followed by Dr. Tolbert for her hernia for some time and Dr. Tolbert felt she was not a candidate for surgery and that she could not repair this hernia because of the size.  Following that the patient was then referred to Dr. Anderson because I was out of town.  Dr. Anderson contacted me and Dr. Anderson felt she was not a surgical candidate due to her comorbidities.  She is finally landed in my office.  She is extremely pleasant along with her family.  Her daughter is with her and the other daughter is in the waiting room.  I personally reviewed the patient's CT scan from May 1, 2021 showing this extremely large cystic lesion involving the anterior sector is of the right lobe of the liver and left lobe of the liver.  The patient also has massive loss of domain of her lower two thirds of her abdomen with a hernia involving much of her small bowel.  The patient complains of  "abdominal pain it is difficult to assess whether to come from the liver or her hernia.  Her hernia has been followed by Dr. Tolbert.  In any event, the patient had a HIDA scan which I reviewed as well at [facility] and it does show that the cyst does not fill.  This would mean it does not communicate with the biliary system.  The patient is here with her daughter to decide on what to do regarding her pain.  She denies any nausea vomiting fever or chills.  She has never had a liver abscess.  She is not on blood thinners.  She has no cirrhosis.      Past Medical History:   Diagnosis Date   • Acquired hypothyroidism 7/18/2017   • Arthritis    • Carotid artery stenosis 10/19/2020   • HTN (hypertension)    • Hypertension    • Kidney stone    • Memory loss 10/4/2017   • Menopause 2/2/2015   • Migraine    • Obesity (BMI 30-39.9) 4/5/2018   • Polymyalgia rheumatica (HCC)      Past Surgical History:   Procedure Laterality Date   • EXPLORATORY LAPAROTOMY  7/3/2014    Performed by Danita Tolbert M.D. at SURGERY [facility]   • BOWEL RESECTION  7/3/2014    Performed by Danita Tolbert M.D. at SURGERY [facility]   • PB REVISE SECONDARY VARICOSITY      \"varicose veins operated on \"    • TONSILLECTOMY     • VAGINAL HYSTERECTOMY TOTAL       No Known Allergies  Outpatient Encounter Medications as of 5/25/2021   Medication Sig Dispense Refill   • HYDROcodone-acetaminophen (NORCO) 5-325 MG Tab per tablet Take 1 tablet by mouth every 8 hours as needed for up to 14 days. 42 tablet 0   • levothyroxine (SYNTHROID) 25 MCG Tab TAKE 1 TABLET BY MOUTH EVERY MORNING ON AN EMPTY STOMACH 90 tablet 3   • meclizine (ANTIVERT) 12.5 MG Tab TAKE 1 TAB BY MOUTH 2 TIMES DAILY WITH MEALS AS NEEDED. 60 tablet 1   • amLODIPine (NORVASC) 5 MG Tab TAKE 1 TABLET BY MOUTH EVERY DAY 90 tablet 1   • atorvastatin (LIPITOR) 20 MG Tab TAKE 1 TABLET BY MOUTH EVERYDAY AT BEDTIME 100 tablet 1   • lisinopril (PRINIVIL) 30 MG tablet TAKE 1 " TABLET BY MOUTH EVERY  Tab 1   • ergocalciferol (DRISDOL) 86621 UNIT capsule Take 1 Cap by mouth every 7 days. 15 Cap 3   • tolterodine ER (DETROL LA) 4 MG CAPSULE SR 24 HR Take 4 mg by mouth every day.     • aspirin 81 MG tablet Take 81 mg by mouth every day.       No facility-administered encounter medications on file as of 5/25/2021.     Social History     Socioeconomic History   • Marital status:      Spouse name: Not on file   • Number of children: Not on file   • Years of education: Not on file   • Highest education level: Not on file   Occupational History   • Occupation: retired     Employer: OTHER   Tobacco Use   • Smoking status: Never Smoker   • Smokeless tobacco: Never Used   Vaping Use   • Vaping Use: Never used   Substance and Sexual Activity   • Alcohol use: Yes     Comment: small drink every 2-3 months    • Drug use: No   • Sexual activity: Never   Other Topics Concern   • Not on file   Social History Narrative    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  She lives with her 2 daughters Yolande and Lesia and son-in-law.  She was  2 years ago.  She is quite independent at home.  She does have slight decline in her memory and November 1, 2019 her Mini-Mental status was 25/30.  No hospitalizations this year.  No social or domestic concerns.     Social Determinants of Health     Financial Resource Strain:    • Difficulty of Paying Living Expenses:    Food Insecurity:    • Worried About Running Out of Food in the Last Year:    • Ran Out of Food in the Last Year:    Transportation Needs:    • Lack of Transportation (Medical):    • Lack of Transportation (Non-Medical):    Physical Activity:    • Days of Exercise per Week:    • Minutes of Exercise per Session:    Stress:    • Feeling of Stress :    Social Connections:    • Frequency of Communication with Friends and Family:    • Frequency of Social Gatherings with Friends and Family:    • Attends Mosque Services:    • Active Member of Clubs or  "Organizations:    • Attends Club or Organization Meetings:    • Marital Status:    Intimate Partner Violence:    • Fear of Current or Ex-Partner:    • Emotionally Abused:    • Physically Abused:    • Sexually Abused:       Social History     Tobacco Use   Smoking Status Never Smoker   Smokeless Tobacco Never Used     Social History     Substance and Sexual Activity   Alcohol Use Yes    Comment: small drink every 2-3 months      Social History     Substance and Sexual Activity   Drug Use No        Family History   Problem Relation Age of Onset   • Heart Disease Mother    • Heart Attack Father    • Diabetes Brother    • Dementia Brother    • Diabetes Maternal Grandmother        Review of Systems   Constitutional: Positive for malaise/fatigue and weight loss.   Gastrointestinal: Positive for abdominal pain.        Change in appetite    Musculoskeletal: Positive for back pain.   Neurological: Positive for weakness.   All other systems reviewed and are negative.       Objective:   /54 (BP Location: Right arm, Patient Position: Sitting, BP Cuff Size: Adult)   Pulse 94   Temp 36.2 °C (97.1 °F) (Temporal)   Ht 1.626 m (5' 4\")   Wt 94.8 kg (209 lb)   SpO2 94%   BMI 35.87 kg/m²     Physical Exam  Vitals and nursing note reviewed.   Constitutional:       Appearance: Normal appearance.   HENT:      Head: Normocephalic and atraumatic.      Nose: Nose normal.      Mouth/Throat:      Mouth: Mucous membranes are moist.   Eyes:      Extraocular Movements: Extraocular movements intact.      Conjunctiva/sclera: Conjunctivae normal.      Pupils: Pupils are equal, round, and reactive to light.   Cardiovascular:      Rate and Rhythm: Regular rhythm. Tachycardia present.   Pulmonary:      Effort: Pulmonary effort is normal.      Breath sounds: Normal breath sounds.   Abdominal:      General: Abdomen is flat. There is distension.      Palpations: Abdomen is soft.      Tenderness: There is abdominal tenderness.      Comments: " Extreme distention due to massive hernia and loss of domain.  I cannot palpate a liver cyst due to the massive hernia.   Musculoskeletal:      Cervical back: Normal range of motion and neck supple.   Skin:     General: Skin is warm and dry.   Neurological:      General: No focal deficit present.      Mental Status: She is alert and oriented to person, place, and time.   Psychiatric:         Mood and Affect: Mood normal.         Behavior: Behavior normal.         Thought Content: Thought content normal.         Judgment: Judgment normal.         Labs:  Results for GUERRERO HOOPER (MRN [id number]) as of 5/24/2021 14:38   Ref. Range 4/20/2021 09:03   Sodium Latest Ref Range: 135 - 145 mmol/L 140   Potassium Latest Ref Range: 3.6 - 5.5 mmol/L 4.3   Chloride Latest Ref Range: 96 - 112 mmol/L 105   Co2 Latest Ref Range: 20 - 33 mmol/L 26   Anion Gap Latest Ref Range: 7.0 - 16.0  9.0   Glucose Latest Ref Range: 65 - 99 mg/dL 84   Bun Latest Ref Range: 8 - 22 mg/dL 19   Creatinine Latest Ref Range: 0.50 - 1.40 mg/dL 0.87   GFR If  Latest Ref Range: >60 mL/min/1.73 m 2 >60   GFR If Non  Latest Ref Range: >60 mL/min/1.73 m 2 >60   Calcium Latest Ref Range: 8.5 - 10.5 mg/dL 10.2   ALT(SGPT) Latest Ref Range: 2 - 50 U/L 18   Glycohemoglobin Latest Ref Range: 4.0 - 5.6 % 6.0 (H)   Estim. Avg Glu Latest Units: mg/dL 126   Fasting Status Unknown Fasting   Cholesterol,Tot Latest Ref Range: 100 - 199 mg/dL 189   Triglycerides Latest Ref Range: 0 - 149 mg/dL 94   HDL Latest Ref Range: >=40 mg/dL 49   LDL Latest Ref Range: <100 mg/dL 121 (H)   Micro Alb Creat Ratio Latest Ref Range: 0 - 30 mg/g see below   Creatinine, Urine Latest Units: mg/dL 153.73   Microalbumin, Urine Random Latest Units: mg/dL <1.2   25-Hydroxy   Vitamin D 25 Latest Ref Range: 30 - 80 ng/mL 25 (L)   Vitamin B12 -True Cobalamin Latest Ref Range: 211 - 911 pg/mL 366   TSH Latest Ref Range: 0.380 - 5.330 uIU/mL 4.220        [facility]   Per my read,     MRCP 5/3/21     5/3/21 HIDA         Pathology:  NA    Procedures:  NA    Diagnosis:     1. Hepatic cyst     2. Hernia of abdominal wall     3. Abnormal CT of the abdomen     4. Abdominal pain, unspecified abdominal location             Medical Decision Making:  Today's Assessment / Status / Plan:     In light of the present findings, the patient's comorbidities and mainly the massive hernia with loss of domain precludes us from operating on the patient.  My recommendation is to have interventional radiology completely drain the cyst and see if her pain is resolved.  If her pain is resolved and we know the PET abdominal pain was related to her cyst.  If her pain does not disappear then we know it is related to her hernia.  Because of the massive loss of domain in the hernia we would need to do a huge open laparotomy to do a marsupialization.  She is 80 years old and someone would need to repair that hernia which most likely would recur and would be extremely complicated and high mortality and morbidity.  If the patient does have improvement with interventional radiology then we could continually do interventional radiology interventions to keep the patient symptom-free.  They have agreed to above plan.    I, Dr. Burnham have entered, reviewed and confirmed the above diagnoses related to this patient on this date of service, 2021  9:42 AM.    She agreed and verbalized her agreement and understanding with the current plan. I answered all questions and concerns she has at this time and advised her to call at any time in the interim with questions or concerns in regards to her care.    Thank you for allowing me to participate in her care, I will continue to follow closely.       Please note that this dictation was created using voice recognition software. I have made every reasonable attempt to correct obvious errors, but I expect that there are errors  of grammar and possibly content that I did not discover before finalizing the note.     Thank you for this consultation and allowing me to participate in your patient's care. If I can be of further service please contact my office.

## 2021-05-25 ENCOUNTER — OFFICE VISIT (OUTPATIENT)
Dept: SURGICAL ONCOLOGY | Facility: MEDICAL CENTER | Age: 81
End: 2021-05-25
Payer: MEDICARE

## 2021-05-25 VITALS
HEART RATE: 94 BPM | SYSTOLIC BLOOD PRESSURE: 122 MMHG | DIASTOLIC BLOOD PRESSURE: 54 MMHG | TEMPERATURE: 97.1 F | WEIGHT: 209 LBS | OXYGEN SATURATION: 94 % | BODY MASS INDEX: 35.68 KG/M2 | HEIGHT: 64 IN

## 2021-05-25 DIAGNOSIS — R10.9 ABDOMINAL PAIN, UNSPECIFIED ABDOMINAL LOCATION: ICD-10-CM

## 2021-05-25 DIAGNOSIS — K43.9 HERNIA OF ABDOMINAL WALL: ICD-10-CM

## 2021-05-25 DIAGNOSIS — K76.89 HEPATIC CYST: ICD-10-CM

## 2021-05-25 DIAGNOSIS — R93.5 ABNORMAL CT OF THE ABDOMEN: ICD-10-CM

## 2021-05-25 PROCEDURE — 99205 OFFICE O/P NEW HI 60 MIN: CPT | Performed by: SURGERY

## 2021-05-25 ASSESSMENT — ENCOUNTER SYMPTOMS
WEIGHT LOSS: 1
ROS GI COMMENTS: CHANGE IN APPETITE
WEAKNESS: 1
ABDOMINAL PAIN: 1
BACK PAIN: 1

## 2021-05-25 ASSESSMENT — FIBROSIS 4 INDEX: FIB4 SCORE: 1.41

## 2021-05-25 NOTE — PATIENT INSTRUCTIONS
The patient will follow up with me after she undergoes interventional radiology drainage of the cyst.

## 2021-06-01 ENCOUNTER — HOSPITAL ENCOUNTER (OUTPATIENT)
Facility: MEDICAL CENTER | Age: 81
End: 2021-06-01
Attending: SURGERY | Admitting: RADIOLOGY
Payer: MEDICARE

## 2021-06-01 ENCOUNTER — APPOINTMENT (OUTPATIENT)
Dept: RADIOLOGY | Facility: MEDICAL CENTER | Age: 81
End: 2021-06-01
Attending: SURGERY
Payer: MEDICARE

## 2021-06-01 VITALS
TEMPERATURE: 97.6 F | DIASTOLIC BLOOD PRESSURE: 58 MMHG | HEART RATE: 67 BPM | BODY MASS INDEX: 35.38 KG/M2 | SYSTOLIC BLOOD PRESSURE: 123 MMHG | RESPIRATION RATE: 16 BRPM | HEIGHT: 64 IN | OXYGEN SATURATION: 93 % | WEIGHT: 207.23 LBS

## 2021-06-01 DIAGNOSIS — K76.89 HEPATIC CYST: ICD-10-CM

## 2021-06-01 DIAGNOSIS — R10.9 ABDOMINAL PAIN, UNSPECIFIED ABDOMINAL LOCATION: ICD-10-CM

## 2021-06-01 DIAGNOSIS — K43.9 HERNIA OF ABDOMINAL WALL: ICD-10-CM

## 2021-06-01 DIAGNOSIS — R93.5 ABNORMAL CT OF THE ABDOMEN: ICD-10-CM

## 2021-06-01 LAB
CYTOLOGY REG CYTOL: NORMAL
ERYTHROCYTE [DISTWIDTH] IN BLOOD BY AUTOMATED COUNT: 47.8 FL (ref 35.9–50)
GRAM STN SPEC: NORMAL
HCT VFR BLD AUTO: 42.9 % (ref 37–47)
HGB BLD-MCNC: 13.3 G/DL (ref 12–16)
INR PPP: 0.97 (ref 0.87–1.13)
MCH RBC QN AUTO: 27.3 PG (ref 27–33)
MCHC RBC AUTO-ENTMCNC: 31 G/DL (ref 33.6–35)
MCV RBC AUTO: 87.9 FL (ref 81.4–97.8)
PLATELET # BLD AUTO: 354 K/UL (ref 164–446)
PMV BLD AUTO: 9.6 FL (ref 9–12.9)
PROTHROMBIN TIME: 13.2 SEC (ref 12–14.6)
RBC # BLD AUTO: 4.88 M/UL (ref 4.2–5.4)
SIGNIFICANT IND 70042: NORMAL
SITE SITE: NORMAL
SOURCE SOURCE: NORMAL
WBC # BLD AUTO: 7.4 K/UL (ref 4.8–10.8)

## 2021-06-01 PROCEDURE — 99153 MOD SED SAME PHYS/QHP EA: CPT

## 2021-06-01 PROCEDURE — 85610 PROTHROMBIN TIME: CPT

## 2021-06-01 PROCEDURE — 700105 HCHG RX REV CODE 258: Performed by: RADIOLOGY

## 2021-06-01 PROCEDURE — 700111 HCHG RX REV CODE 636 W/ 250 OVERRIDE (IP): Performed by: RADIOLOGY

## 2021-06-01 PROCEDURE — 88112 CYTOPATH CELL ENHANCE TECH: CPT

## 2021-06-01 PROCEDURE — 160002 HCHG RECOVERY MINUTES (STAT)

## 2021-06-01 PROCEDURE — 85027 COMPLETE CBC AUTOMATED: CPT

## 2021-06-01 PROCEDURE — 87205 SMEAR GRAM STAIN: CPT

## 2021-06-01 PROCEDURE — 700111 HCHG RX REV CODE 636 W/ 250 OVERRIDE (IP)

## 2021-06-01 PROCEDURE — 87070 CULTURE OTHR SPECIMN AEROBIC: CPT

## 2021-06-01 PROCEDURE — 88305 TISSUE EXAM BY PATHOLOGIST: CPT

## 2021-06-01 RX ORDER — OXYCODONE HYDROCHLORIDE 5 MG/1
5 TABLET ORAL
Status: DISCONTINUED | OUTPATIENT
Start: 2021-06-01 | End: 2021-06-01 | Stop reason: HOSPADM

## 2021-06-01 RX ORDER — SODIUM CHLORIDE 9 MG/ML
500 INJECTION, SOLUTION INTRAVENOUS
Status: DISPENSED | OUTPATIENT
Start: 2021-06-01 | End: 2021-06-01

## 2021-06-01 RX ORDER — CEFAZOLIN SODIUM 2 G/100ML
2 INJECTION, SOLUTION INTRAVENOUS ONCE
Status: DISCONTINUED | OUTPATIENT
Start: 2021-06-01 | End: 2021-06-01 | Stop reason: HOSPADM

## 2021-06-01 RX ORDER — CEFAZOLIN SODIUM 1 G/3ML
INJECTION, POWDER, FOR SOLUTION INTRAMUSCULAR; INTRAVENOUS
Status: COMPLETED
Start: 2021-06-01 | End: 2021-06-01

## 2021-06-01 RX ORDER — ONDANSETRON 2 MG/ML
4 INJECTION INTRAMUSCULAR; INTRAVENOUS EVERY 8 HOURS PRN
Status: DISCONTINUED | OUTPATIENT
Start: 2021-06-01 | End: 2021-06-01 | Stop reason: HOSPADM

## 2021-06-01 RX ORDER — MIDAZOLAM HYDROCHLORIDE 1 MG/ML
.5-2 INJECTION INTRAMUSCULAR; INTRAVENOUS PRN
Status: DISCONTINUED | OUTPATIENT
Start: 2021-06-01 | End: 2021-06-01

## 2021-06-01 RX ORDER — ONDANSETRON 2 MG/ML
4 INJECTION INTRAMUSCULAR; INTRAVENOUS PRN
Status: DISCONTINUED | OUTPATIENT
Start: 2021-06-01 | End: 2021-06-01

## 2021-06-01 RX ORDER — MIDAZOLAM HYDROCHLORIDE 1 MG/ML
INJECTION INTRAMUSCULAR; INTRAVENOUS
Status: COMPLETED
Start: 2021-06-01 | End: 2021-06-01

## 2021-06-01 RX ORDER — OXYCODONE HYDROCHLORIDE 10 MG/1
10 TABLET ORAL
Status: DISCONTINUED | OUTPATIENT
Start: 2021-06-01 | End: 2021-06-01 | Stop reason: HOSPADM

## 2021-06-01 RX ADMIN — FENTANYL CITRATE 25 MCG: 50 INJECTION, SOLUTION INTRAMUSCULAR; INTRAVENOUS at 13:22

## 2021-06-01 RX ADMIN — CEFAZOLIN 2000 MG: 330 INJECTION, POWDER, FOR SOLUTION INTRAMUSCULAR; INTRAVENOUS at 13:15

## 2021-06-01 RX ADMIN — MIDAZOLAM HYDROCHLORIDE 1 MG: 1 INJECTION, SOLUTION INTRAMUSCULAR; INTRAVENOUS at 13:22

## 2021-06-01 RX ADMIN — FENTANYL CITRATE 50 MCG: 50 INJECTION, SOLUTION INTRAMUSCULAR; INTRAVENOUS at 13:14

## 2021-06-01 RX ADMIN — MIDAZOLAM HYDROCHLORIDE 2 MG: 1 INJECTION, SOLUTION INTRAMUSCULAR; INTRAVENOUS at 13:14

## 2021-06-01 ASSESSMENT — FIBROSIS 4 INDEX: FIB4 SCORE: 1.04

## 2021-06-01 NOTE — OR NURSING
1412 Pt arrived to PPU with IR RN. AAOx4. VSS. Denies pain and nausea. Right upper abdominal dressing CDI and soft. Belongings returned to pt bedside. POC update given to Sheri, daughter, in the waiting room.     1509 Discharge instructions reviewed with pt and daughter. All verbalize understanding and demonstrate teach back.     1612 Discharge criteria met. PIV removed. Pt dressed and ambulated. Discharged via wheelchair with RN escort to responsible adult. All belongings with pt.

## 2021-06-01 NOTE — DISCHARGE INSTRUCTIONS
ACTIVITY: Rest and take it easy for the first 24 hours.  A responsible adult is recommended to remain with you during that time.  It is normal to feel sleepy.  We encourage you to not do anything that requires balance, judgment or coordination.    MILD FLU-LIKE SYMPTOMS ARE NORMAL. YOU MAY EXPERIENCE GENERALIZED MUSCLE ACHES, THROAT IRRITATION, HEADACHE AND/OR SOME NAUSEA.    FOR 24 HOURS DO NOT:  Drive, operate machinery or run household appliances.  Drink beer or alcoholic beverages.   Make important decisions or sign legal documents.    SPECIAL INSTRUCTIONS: Needle Drainage, Care After  This sheet gives you information about how to care for yourself after your procedure. Your health care provider may also give you more specific instructions. If you have problems or questions, contact your health care provider.  What can I expect after the procedure?  After the procedure, it is common to have soreness, bruising, or mild pain at the puncture site. This should go away in a few days.  Follow these instructions at home:  Needle insertion site care  · Wash your hands with soap and water before you change your bandage (dressing). If you cannot use soap and water, use hand .  · Follow instructions from your health care provider about how to take care of your puncture site. This includes:  ? When and how to change your dressing.  ? When to remove your dressing.  · Check your puncture site every day for signs of infection. Check for:  ? Redness, swelling, or pain.  ? Fluid or blood.  ? Pus or a bad smell.  ? Warmth.  General instructions  · Return to your normal activities as told by your health care provider. Ask your health care provider what activities are safe for you.  · Do not take baths, swim, or use a hot tub until your health care provider approves. Ask your health care provider if you may take showers. You may only be allowed to take sponge baths.  · Take over-the-counter and prescription medicines  only as told by your health care provider.  · Keep all follow-up visits as told by your health care provider. This is important.  Contact a health care provider if:  · You have a fever.  · You have redness, swelling, or pain at the puncture site that lasts longer than a few days.  · You have fluid, blood, or pus coming from your puncture site.  · Your puncture site feels warm to the touch.  Get help right away if:  · You have severe bleeding from the puncture site.  Summary  · After the procedure, it is common to have soreness, bruising, or mild pain at the puncture site. This should go away in a few days.  · Check your puncture site every day for signs of infection, such as redness, swelling, or pain.  · Get help right away if you have severe bleeding from your puncture site.    DIET: To avoid nausea, slowly advance diet as tolerated, avoiding spicy or greasy foods for the first day.  Add more substantial food to your diet according to your physician's instructions.  Babies can be fed formula or breast milk as soon as they are hungry.  INCREASE FLUIDS AND FIBER TO AVOID CONSTIPATION.    SURGICAL DRESSING/BATHING: Keep dressing dry for 24 hours. May remove dressing and shower after 4pm on 6/2, do not need to replace dressing. Do not submerge in water or bath for 7 days.     FOLLOW-UP APPOINTMENT:  A follow-up appointment should be arranged with your doctor in 1 week with Dr. Burnham [phone]; call to schedule.    You should CALL YOUR PHYSICIAN if you develop:  Fever greater than 101 degrees F.  Pain not relieved by medication, or persistent nausea or vomiting.  Excessive bleeding (blood soaking through dressing) or unexpected drainage from the wound.  Extreme redness or swelling around the incision site, drainage of pus or foul smelling drainage.  Inability to urinate or empty your bladder within 8 hours.  Problems with breathing or chest pain.    You should call 911 if you develop problems with breathing or  chest pain.  If you are unable to contact your doctor or surgical center, you should go to the nearest emergency room or urgent care center.  Physician's telephone #: [email or web address] [phone]    If any questions arise, call your doctor.  If your doctor is not available, please feel free to call the Surgical Center at [phone]. The Contact Center is open Monday through Friday 7AM to 5PM and may speak to a nurse at [phone], or toll free at [phone].     A registered nurse may call you a few days after your surgery to see how you are doing after your procedure.    MEDICATIONS: Resume taking daily medication.  Take prescribed pain medication with food.  If no medication is prescribed, you may take non-aspirin pain medication if needed.  PAIN MEDICATION CAN BE VERY CONSTIPATING.  Take a stool softener or laxative such as senokot, pericolace, or milk of magnesia if needed.    If your physician has prescribed pain medication that includes Acetaminophen (Tylenol), do not take additional Acetaminophen (Tylenol) while taking the prescribed medication.    Depression / Suicide Risk    As you are discharged from this [facility] facility, it is important to learn how to keep safe from harming yourself.    Recognize the warning signs:  · Abrupt changes in personality, positive or negative- including increase in energy   · Giving away possessions  · Change in eating patterns- significant weight changes-  positive or negative  · Change in sleeping patterns- unable to sleep or sleeping all the time   · Unwillingness or inability to communicate  · Depression  · Unusual sadness, discouragement and loneliness  · Talk of wanting to die  · Neglect of personal appearance   · Rebelliousness- reckless behavior  · Withdrawal from people/activities they love  · Confusion- inability to concentrate     If you or a loved one observes any of these behaviors or has concerns about self-harm, here's what you can do:  · Talk about it-  your feelings and reasons for harming yourself  · Remove any means that you might use to hurt yourself (examples: pills, rope, extension cords, firearm)  · Get professional help from the community (Mental Health, Substance Abuse, psychological counseling)  · Do not be alone:Call your Safe Contact- someone whom you trust who will be there for you.  · Call your local CRISIS HOTLINE 402-9308 or 206-622-8250  · Call your local Children's Mobile Crisis Response Team [place] (121) 004-1344 or www.Magine  · Call the toll free National Suicide Prevention Hotlines   · National Suicide Prevention Lifeline 932-429-CKVJ (6655)  · National Hope Line Network 800-SUICIDE (834-5123)

## 2021-06-01 NOTE — PROGRESS NOTES
Interventional Radiology RN Note:     Drainage of liver cyst performed by Dr. Arzola with assistance of CT Tech Bela; Procedure explained to patient and her daughters by MD prior to start and consent obtained, all questions/concerns addressed; Moderate sedation administered per physician request for desired effect, see MAR for details.    2000cc drained from cyst by MD via right abdomen; 20cc cyst drainage sent to lab for analysis; Puncture site covered with gauze and tegaderm upon completion; Dressing C/D/I.     Patient tolerated procedure well; Drowsy and appropriate post-intervention.     Report given to RN Tanya; Patient transported to [facility]; IR RN monitored throughout transport, then care assumed by report RN.

## 2021-06-01 NOTE — OR SURGEON
Immediate Post- Operative Note        PostOp Diagnosis: Large Hepatic Cyst    Procedure(s): CT Drainage      Estimated Blood Loss: Less than 5 ml        Complications: None            6/1/2021     1:22 PM     Nakul Arzola M.D.

## 2021-06-04 LAB
BACTERIA FLD AEROBE CULT: NORMAL
GRAM STN SPEC: NORMAL
SIGNIFICANT IND 70042: NORMAL
SITE SITE: NORMAL
SOURCE SOURCE: NORMAL

## 2021-06-07 NOTE — PROGRESS NOTES
"Subjective:   6/8/2021 11:06 AM  Primary care physician:Meir Thomason M.D.  Referring Provider: Sammy Cordova MD       Chief Complaint:   Chief Complaint   Patient presents with   • Follow-Up     FV LIVER CYST DRAINED      Diagnosis:   1. Hepatic cyst     2. Hernia of abdominal wall     3. Abdominal pain, unspecified abdominal location         History of presenting illness:  Katharine Gee  is a pleasant 80 y.o. year old female who presented with follow-up status post aspiration of a large liver cyst done by interventional radiology.  She states that immediately after her drainage she felt very good.  She denies any fever or chills, nausea or vomiting.  Her pain went away.  She is in good spirits along with her daughter.  She is here to discuss neck steps and plan.      Past Medical History:   Diagnosis Date   • Acquired hypothyroidism 7/18/2017   • Arthritis    • Carotid artery stenosis 10/19/2020   • HTN (hypertension)    • Hypertension    • Kidney stone    • Memory loss 10/4/2017   • Menopause 2/2/2015   • Migraine    • Obesity (BMI 30-39.9) 4/5/2018   • Polymyalgia rheumatica (HCC)      Past Surgical History:   Procedure Laterality Date   • EXPLORATORY LAPAROTOMY  7/3/2014    Performed by Danita Tolbert M.D. at SURGERY [facility]   • BOWEL RESECTION  7/3/2014    Performed by Danita Tolbert M.D. at SURGERY [facility]   • PB REVISE SECONDARY VARICOSITY      \"varicose veins operated on \"    • TONSILLECTOMY     • VAGINAL HYSTERECTOMY TOTAL       No Known Allergies  Outpatient Encounter Medications as of 6/8/2021   Medication Sig Dispense Refill   • levothyroxine (SYNTHROID) 25 MCG Tab TAKE 1 TABLET BY MOUTH EVERY MORNING ON AN EMPTY STOMACH 90 tablet 3   • meclizine (ANTIVERT) 12.5 MG Tab TAKE 1 TAB BY MOUTH 2 TIMES DAILY WITH MEALS AS NEEDED. 60 tablet 1   • amLODIPine (NORVASC) 5 MG Tab TAKE 1 TABLET BY MOUTH EVERY DAY 90 tablet 1   • atorvastatin (LIPITOR) 20 MG Tab TAKE 1 TABLET BY MOUTH EVERYDAY " AT BEDTIME 100 tablet 1   • lisinopril (PRINIVIL) 30 MG tablet TAKE 1 TABLET BY MOUTH EVERY  Tab 1   • ergocalciferol (DRISDOL) 70904 UNIT capsule Take 1 Cap by mouth every 7 days. 15 Cap 3   • tolterodine ER (DETROL LA) 4 MG CAPSULE SR 24 HR Take 4 mg by mouth every day.     • aspirin 81 MG tablet Take 81 mg by mouth every day.       No facility-administered encounter medications on file as of 6/8/2021.     Social History     Socioeconomic History   • Marital status:      Spouse name: Not on file   • Number of children: Not on file   • Years of education: Not on file   • Highest education level: Not on file   Occupational History   • Occupation: retired     Employer: OTHER   Tobacco Use   • Smoking status: Never Smoker   • Smokeless tobacco: Never Used   Vaping Use   • Vaping Use: Never used   Substance and Sexual Activity   • Alcohol use: Yes     Comment: small drink every 2-3 months    • Drug use: No   • Sexual activity: Never   Other Topics Concern   • Not on file   Social History Narrative    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  She lives with her 2 daughters Yolande and Lesia and son-in-law.  She was  2 years ago.  She is quite independent at home.  She does have slight decline in her memory and November 1, 2019 her Mini-Mental status was 25/30.  No hospitalizations this year.  No social or domestic concerns.     Social Determinants of Health     Financial Resource Strain:    • Difficulty of Paying Living Expenses:    Food Insecurity:    • Worried About Running Out of Food in the Last Year:    • Ran Out of Food in the Last Year:    Transportation Needs:    • Lack of Transportation (Medical):    • Lack of Transportation (Non-Medical):    Physical Activity:    • Days of Exercise per Week:    • Minutes of Exercise per Session:    Stress:    • Feeling of Stress :    Social Connections:    • Frequency of Communication with Friends and Family:    • Frequency of Social Gatherings with Friends and  "Family:    • Attends [employer] Services:    • Active Member of Clubs or Organizations:    • Attends Club or Organization Meetings:    • Marital Status:    Intimate Partner Violence:    • Fear of Current or Ex-Partner:    • Emotionally Abused:    • Physically Abused:    • Sexually Abused:       Social History     Tobacco Use   Smoking Status Never Smoker   Smokeless Tobacco Never Used     Social History     Substance and Sexual Activity   Alcohol Use Yes    Comment: small drink every 2-3 months      Social History     Substance and Sexual Activity   Drug Use No        Family History   Problem Relation Age of Onset   • Heart Disease Mother    • Heart Attack Father    • Diabetes Brother    • Dementia Brother    • Diabetes Maternal Grandmother        Review of Systems   Gastrointestinal: Positive for abdominal pain.   All other systems reviewed and are negative.       Objective:   /68 (BP Location: Left arm, Patient Position: Sitting, BP Cuff Size: Adult)   Pulse 80   Temp 36.2 °C (97.2 °F) (Temporal)   Ht 1.626 m (5' 4\")   Wt 92.1 kg (203 lb)   SpO2 92%   BMI 34.84 kg/m²     Physical Exam  Vitals and nursing note reviewed.   HENT:      Head: Normocephalic and atraumatic.      Mouth/Throat:      Mouth: Mucous membranes are moist.   Eyes:      Extraocular Movements: Extraocular movements intact.      Pupils: Pupils are equal, round, and reactive to light.   Cardiovascular:      Rate and Rhythm: Normal rate and regular rhythm.      Pulses: Normal pulses.      Heart sounds: Normal heart sounds.   Pulmonary:      Effort: Pulmonary effort is normal.      Breath sounds: Normal breath sounds.   Abdominal:      General: Abdomen is flat. Bowel sounds are normal.      Palpations: Abdomen is soft.      Comments: Her right upper quadrant pain is gone.   Musculoskeletal:      Cervical back: Normal range of motion and neck supple.   Skin:     General: Skin is warm and dry.   Neurological:      General: No focal deficit " present.      Mental Status: She is oriented to person, place, and time.   Psychiatric:         Mood and Affect: Mood normal.         Behavior: Behavior normal.         Thought Content: Thought content normal.         Judgment: Judgment normal.         Labs:  Results for GUERRERO HOOPER (MRN [id number]) as of 2021 13:47   Ref. Range 2021 11:36   WBC Latest Ref Range: 4.8 - 10.8 K/uL 7.4   RBC Latest Ref Range: 4.20 - 5.40 M/uL 4.88   Hemoglobin Latest Ref Range: 12.0 - 16.0 g/dL 13.3   Hematocrit Latest Ref Range: 37.0 - 47.0 % 42.9   MCV Latest Ref Range: 81.4 - 97.8 fL 87.9   MCH Latest Ref Range: 27.0 - 33.0 pg 27.3   MCHC Latest Ref Range: 33.6 - 35.0 g/dL 31.0 (L)   RDW Latest Ref Range: 35.9 - 50.0 fL 47.8   Platelet Count Latest Ref Range: 164 - 446 K/uL 354   MPV Latest Ref Range: 9.0 - 12.9 fL 9.6   INR Latest Ref Range: 0.87 - 1.13  0.97   PT Latest Ref Range: 12.0 - 14.6 sec 13.2       [facility]   Per my read,     MRCP 5/3/21     5/3/21 HIDA         Pathology: 21    Hepatic fluid:          No malignant cells identified          Blood elements and a few degenerating inflammatory cells           including rare histiocytes     Procedures: 21    Liver Cyst Drainage with CT guidance    Diagnosis:     1. Hepatic cyst     2. Hernia of abdominal wall     3. Abdominal pain, unspecified abdominal location             Medical Decision Making:  Today's Assessment / Status / Plan:     In light of the present findings, the patient has done well since her aspiration.  She has no pain.  Because of her multiple comorbidities my recommendation is that she call me if she develops severe enough pain to go to the ER and we will send her for repeat drainage.  She is not a surgical candidate thus this is her only option.  They agree to the above plan.  She will be seen as needed.    I, Dr. Burnham have entered, reviewed and confirmed the above diagnoses related to this  patient on this date of service, 6/8/2021 11:06 AM.    She agreed and verbalized her agreement and understanding with the current plan. I answered all questions and concerns she has at this time and advised her to call at any time in the interim with questions or concerns in regards to her care.    Thank you for allowing me to participate in her care, I will continue to follow closely.       Please note that this dictation was created using voice recognition software. I have made every reasonable attempt to correct obvious errors, but I expect that there are errors of grammar and possibly content that I did not discover before finalizing the note.     Thank you for this consultation and allowing me to participate in your patient's care. If I can be of further service please contact my office.      I, Dr. Burnham have entered, reviewed and confirmed the above diagnoses related to this patient on this date of service, 6/8/2021 11:06 AM.    She agreed and verbalized her agreement and understanding with the current plan. I answered all questions and concerns she has at this time and advised her to call at any time in the interim with questions or concerns in regards to her care.    Thank you for allowing me to participate in her care, I will continue to follow closely.       Please note that this dictation was created using voice recognition software. I have made every reasonable attempt to correct obvious errors, but I expect that there are errors of grammar and possibly content that I did not discover before finalizing the note.     Thank you for this consultation and allowing me to participate in your patient's care. If I can be of further service please contact my office.

## 2021-06-08 ENCOUNTER — OFFICE VISIT (OUTPATIENT)
Dept: SURGICAL ONCOLOGY | Facility: MEDICAL CENTER | Age: 81
End: 2021-06-08
Payer: MEDICARE

## 2021-06-08 VITALS
OXYGEN SATURATION: 92 % | BODY MASS INDEX: 34.66 KG/M2 | HEART RATE: 80 BPM | WEIGHT: 203 LBS | DIASTOLIC BLOOD PRESSURE: 68 MMHG | HEIGHT: 64 IN | TEMPERATURE: 97.2 F | SYSTOLIC BLOOD PRESSURE: 124 MMHG

## 2021-06-08 DIAGNOSIS — R10.9 ABDOMINAL PAIN, UNSPECIFIED ABDOMINAL LOCATION: ICD-10-CM

## 2021-06-08 DIAGNOSIS — K43.9 HERNIA OF ABDOMINAL WALL: ICD-10-CM

## 2021-06-08 DIAGNOSIS — K76.89 HEPATIC CYST: ICD-10-CM

## 2021-06-08 PROCEDURE — 99213 OFFICE O/P EST LOW 20 MIN: CPT | Performed by: SURGERY

## 2021-06-08 ASSESSMENT — ENCOUNTER SYMPTOMS: ABDOMINAL PAIN: 1

## 2021-06-08 ASSESSMENT — FIBROSIS 4 INDEX: FIB4 SCORE: 1.04

## 2021-07-01 NOTE — PROGRESS NOTES
----- Message from Haider Harry DO sent at 7/1/2021  6:48 AM EDT -----  Please let pt know that urine culture actually did grow some bacteria, this may be contributing to some of her symptoms. I sent bactrim to the pharmacy to treat this  Let me know if questions, thanks! cc: Stage III kidney disease, prediabetes, hyperlipidemia, mild vitamin D deficiency    Subjective:     Katharine Gee is a 79 y.o. female presenting she is here today with her daughter Yolande.  She did bring her blood pressure logs today in clinic and her lowest reading was 110/70 and the highest was 150/70 and average is 140s over 70 and heart rate has been between 60s to 90s.  Not having any chest pain or problems breathing.  Her umbilical hernia is still bothering her and she would like to see the general surgeon regarding this.  This Friday she will be seeing the urologist for her overactive bladder.  January 29 she did lab work and her CBC is within normal limits, CMP within normal limits but BUN increased mildly at 27, GFR 56 and previously it was 55 but creatinine within normal limits, A1c 6.2, TSH thyroid within normal limits, but panel increased with LDL at 138, total cholesterol 214 but HDL and triglycerides within normal limit, vitamin D 31.  She is not having any other joint pain but the main discomfort is through her abdominal wall.    Review of systems:     Constitutional: Negative for fever, chills and negative fatigue.   HENT: Negative for sinus pressure  Eyes: Negative for blurriness  Respiratory: Negative for cough and shortness of breath, negative for exertional shortness of breath  Cardiovascular: Negative for leg swelling, negative for palpitations, negative for chest pain  Gastrointestinal: Negative for nausea, vomiting, abdominal pain, constipation and diarrhea.  Genitourinary: Negative for dysuria and hematuria.   Neurological: Negative for dizziness, focal weakness and headaches.   Endo/Heme/Allergies: Denies bleeding, bruising, and recurrent allergies.  Psychiatric/Behavioral: Negative for depression and anxiety.        Current Outpatient Medications:   •  tolterodine ER (DETROL LA) 4 MG CAPSULE SR 24 HR, Take 4 mg by mouth every day., Disp: , Rfl:   •  atorvastatin (LIPITOR)  "20 MG Tab, Take 1 Tab by mouth every bedtime., Disp: 100 Tab, Rfl: 1  •  Ergocalciferol (VITAMIN D2) 50 MCG (2000 UT) Tab, Take 2,000 Units by mouth every day., Disp: , Rfl:   •  levothyroxine (SYNTHROID) 25 MCG Tab, Take 1 Tab by mouth Every morning on an empty stomach., Disp: 100 Tab, Rfl: 1  •  lisinopril (PRINIVIL) 30 MG tablet, Take 1 Tab by mouth every day., Disp: 100 Tab, Rfl: 0  •  amLODIPine (NORVASC) 5 MG Tab, Take 1 Tab by mouth every day., Disp: 100 Tab, Rfl: 1  •  aspirin 81 MG tablet, Take 81 mg by mouth every day., Disp: , Rfl:     Allergies, past medical history, past surgical history, family history, social history reviewed and updated    Objective:     Vitals: /80 (BP Location: Right arm, Patient Position: Sitting, BP Cuff Size: Adult)   Pulse 83   Temp 36.2 °C (97.1 °F) (Temporal)   Resp 16   Ht 1.626 m (5' 4\")   Wt 99.1 kg (218 lb 6.4 oz)   SpO2 95%   BMI 37.49 kg/m²   General: Alert, pleasant, NAD  HEENT: Normocephalic.  Nontraumatic. EOMI, no icterus or pallor.  Conjunctivae and lids normal. External ears normal.   Heart: Regular rate and rhythm.  S1 and S2 normal.  No murmurs appreciated.  Respiratory: Normal respiratory effort.  Clear to auscultation bilaterally.  Abdomen: Non-distended, soft, mild tenderness in reducibility of the ventral hernia 10 cm x 7 cm with normal color of her skin.  Skin: Warm, dry, no rashes.  Musculoskeletal: Gait is normal.  Moves all extremities well.  Extremities: No leg edema.  Pedal pulses 2+ symmetric.   Psych:  Affect/mood is normal, judgement is good, memory is intact, grooming is appropriate.    Assessment/Plan:     Diagnoses and all orders for this visit:    Hernia of abdominal wall  -     REFERRAL TO GENERAL SURGERY    Overactive bladder    Acquired hypothyroidism  -     levothyroxine (SYNTHROID) 25 MCG Tab; Take 1 Tab by mouth Every morning on an empty stomach.    Essential hypertension  -     atorvastatin (LIPITOR) 20 MG Tab; Take 1 Tab by " mouth every bedtime.  -     lisinopril (PRINIVIL) 30 MG tablet; Take 1 Tab by mouth every day.  -     amLODIPine (NORVASC) 5 MG Tab; Take 1 Tab by mouth every day.    Vitamin D deficiency    CKD (chronic kidney disease) stage 3, GFR 30-59 ml/min ([facility])    Prediabetes  -     atorvastatin (LIPITOR) 20 MG Tab; Take 1 Tab by mouth every bedtime.    Other hyperlipidemia  -     atorvastatin (LIPITOR) 20 MG Tab; Take 1 Tab by mouth every bedtime.    -January 29 she did lab work and her CBC is within normal limits, CMP within normal limits but BUN increased mildly at 27, GFR 56 and previously it was 55 but creatinine within normal limits, A1c 6.2, TSH thyroid within normal limits, but panel increased with LDL at 138, total cholesterol 214 but HDL and triglycerides within normal limit, vitamin D 31. -Due to her heart disease risk factors she will start atorvastatin 20 mg daily and we will increase her lisinopril from 20 mg and now to 30 mg daily for blood pressure goal of less than 130s over 90s and continue amlodipine 5 mg daily for blood pressure and atorvastatin 20 mg for her cholesterol.  Her TSH is doing well so 6 months of levothyroxine 25 mcg refilled.  She has a urology appointment this Friday and will find out if she is going to continue the Detrol LA and get that refilled or adjusted by them.  She will take vitamin D 2000 units additional over-the-counter as a like vitamin D to be above 50.  We did talk about the prediabetes and how to reduce the carbohydrates and can actually control this and could recheck this in about 6 months if you want with your thyroid.  And also check your kidney function then as she does have stage III kidney disease and we talked how to prevent progression by drinking of water, reducing salt, avoiding medications that could damage the kidney and just to monitor this and if it does get worse into stage IV then can consider seeing a nephrologist.  Otherwise she will see her urologist this  Friday.  We will also send a referral to a general surgeon Dr. Tolbert as her ventral hernia is getting larger and more tender and please read patient instruction section on when not to wait and go to the ER if you are having a lot of abdominal pain or if it is changing color so please read the patient instruction section otherwise if you do not hear back from your general surgeon to make an appointment in a week then please call the referrals department to schedule that appointment.  Otherwise we want her blood pressure to be below 130s over 90s and we will see how she does with the lisinopril and her blood pressure could be elevated due to the fact that her umbilical hernia has been bothering her otherwise she will follow-up in about 2 months to establish care with a new provider to see how her blood pressure is doing.  With a new doctor please provide your medication bottles and bring in your blood pressure log below 2-3 times a week.    Return in about 2 months (around 4/5/2020), or establish care with new provider/blood pressure check.

## 2021-07-21 ENCOUNTER — PATIENT MESSAGE (OUTPATIENT)
Dept: HEALTH INFORMATION MANAGEMENT | Facility: OTHER | Age: 81
End: 2021-07-21

## 2021-08-09 DIAGNOSIS — I10 ESSENTIAL HYPERTENSION: ICD-10-CM

## 2021-08-10 RX ORDER — LISINOPRIL 30 MG/1
TABLET ORAL
Qty: 100 TABLET | Refills: 1 | Status: SHIPPED | OUTPATIENT
Start: 2021-08-10 | End: 2021-11-22 | Stop reason: SDUPTHER

## 2021-08-26 ENCOUNTER — OFFICE VISIT (OUTPATIENT)
Dept: NEUROLOGY | Facility: MEDICAL CENTER | Age: 81
End: 2021-08-26
Attending: PSYCHIATRY & NEUROLOGY
Payer: MEDICARE

## 2021-08-26 VITALS
HEART RATE: 78 BPM | HEIGHT: 64 IN | TEMPERATURE: 96.9 F | DIASTOLIC BLOOD PRESSURE: 68 MMHG | WEIGHT: 198.85 LBS | SYSTOLIC BLOOD PRESSURE: 128 MMHG | OXYGEN SATURATION: 95 % | BODY MASS INDEX: 33.95 KG/M2

## 2021-08-26 DIAGNOSIS — F03.90 DEMENTIA WITHOUT BEHAVIORAL DISTURBANCE, UNSPECIFIED DEMENTIA TYPE: Primary | ICD-10-CM

## 2021-08-26 PROCEDURE — 99205 OFFICE O/P NEW HI 60 MIN: CPT | Performed by: PSYCHIATRY & NEUROLOGY

## 2021-08-26 PROCEDURE — 99212 OFFICE O/P EST SF 10 MIN: CPT | Performed by: PSYCHIATRY & NEUROLOGY

## 2021-08-26 RX ORDER — DONEPEZIL HYDROCHLORIDE 5 MG/1
5 TABLET, FILM COATED ORAL EVERY EVENING
Qty: 30 TABLET | Refills: 0 | Status: SHIPPED | OUTPATIENT
Start: 2021-08-26 | End: 2021-10-06

## 2021-08-26 RX ORDER — DONEPEZIL HYDROCHLORIDE 10 MG/1
10 TABLET, FILM COATED ORAL EVERY EVENING
Qty: 30 TABLET | Refills: 6 | Status: SHIPPED | OUTPATIENT
Start: 2021-08-26 | End: 2022-01-06 | Stop reason: SDUPTHER

## 2021-08-26 ASSESSMENT — ENCOUNTER SYMPTOMS
MEMORY LOSS: 1
INSOMNIA: 0
FALLS: 0
DEPRESSION: 0
SENSORY CHANGE: 0
FOCAL WEAKNESS: 0

## 2021-08-26 ASSESSMENT — FIBROSIS 4 INDEX: FIB4 SCORE: 1.04

## 2021-08-26 NOTE — PROGRESS NOTES
"Subjective     Katharine Gee is a 80 y.o. female who presents with her daughter Yolande, for consultation, from the office of Dr. Meir Thomason MD, with a history of progressive memory loss.  Her daughter provides additional history and color commentary.     STACEY Alcantar is a very pleasant 80-year-old right-handed woman whose memory issues started about 10 years ago, but which have been slowly progressed, more noticeably since the passing of her  on June 13, 2017.    The patient herself is not sure why she is here to see me, she then states that it is because her \"medications need to be refilled\".  She thinks she may have some memory difficulties, but she does not believe them to be too severe.  She is also not sure if she has seen me before.    Slowly and steadily, according to her daughter, things are getting worse.  More often she is repeating herself, requires more reminders to do the same thing or remember recent events.  Even with cueing, details of these events are more consistently forgotten.  She will even forget distant events such as the date of her 's passing.  With appointments, it is her daughter who keeps track of them.  Medications require more assistance, she may take them when given to her, but her daughter has to keep them coordinated in the medication tray.    She will complete tasks at home but her organization is becoming curtailed.  Multitasking has become an impossibility.  She has forgotten how to use the washer and dryer, using the remote control for the TV now more problematic.  If distracted, tasks are left incomplete more often.  She is still good with her ADLs.  Bowel and bladder control are maintained though she has been given Detrol to control some nocturia.  She gave up driving on her own simply because she felt \"uncomfortable\", she also was concerned that \"she was not going to the right place\", though she never actually got lost.    She has become more " withdrawn in social circumstance.  A little more irritable at times, the irritability does occur in appropriate circumstance.  She sleeps well.  She denies feeling depressed.  Appetite and weight are stable.  She is a little unsteady as she walks, but uses a cane for stability.  She is not falling with any kind of regularity.    From April of this year, CT scan of the brain was reviewed, revealing some generalized atrophy but no evidence of chronic white matter ischemic change out of proportion nor evidence of NPH/obstructive hydrocephalus.  TSH and B12 from April also were normal.  She has not been treated.    She has a history of migraine as a younger woman, dyslipidemia, hypertension and hypothyroidism.  When directly questions, the patient herself has no recall of any of this.  There is no documented history of neurodegenerative disease, MS, seizure, CAD, CVA, PVD, ISABELLE, COPD, autoimmune disease, psychiatric disease, or blood dyscrasia.    There is no surgical history of note.  She has no history of pacemaker placement.    Her brother suffered from Alzheimer's disease, neither of her parents did.  None of her children suffer from memory loss or diagnosed dementia.    She rarely drinks, has no history of tobacco use.  She has 11th grade education but works full-time for a [employer] company, and also achieve the level of  at a bank.  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  She now lives with her daughter and son-in-law along with her other daughter, all under one roof.    She is on Norvasc, Prinivil, Synthroid, baby aspirin, vitamin D and Detrol LA.    Review of Systems   Musculoskeletal: Negative for falls.   Neurological: Negative for sensory change and focal weakness.   Psychiatric/Behavioral: Positive for memory loss. Negative for depression. The patient does not have insomnia.    All other systems reviewed and are negative.    Objective     /68 (BP Location: Right arm, Patient Position:  "Sitting)   Pulse 78   Temp 36.1 °C (96.9 °F) (Temporal)   Ht 1.626 m (5' 4\")   Wt 90.2 kg (198 lb 13.7 oz)   SpO2 95%   BMI 34.13 kg/m²      Physical Exam    She appears in no acute distress.  She is clean and appropriately dressed, she is cooperative.  She tends to look at her daughter when questioned.  She is pleasant in spirit and demeanor.  Vital signs are stable.  There is no malar rash or jaw claudication.  Her neck is supple, range of motion is full.  Cardiac evaluation is unremarkable.  There is no significant lower extremity edema.     Neurological Exam    She is oriented only to the city, MoCA is 11/30.  She has difficulties with functions in all domains assessed.  Frontal release signs are present, there is no rostrocaudal extinction.  She remembers her present address at her daughter's house.    PERRLA/EOMI, myopia she has limits funduscopic examination.  Visual fields are full to movement detection on confrontation.  Facial movements are symmetric, there is no hypophonia or dysarthria.  Eye blink frequency is maintained.  Sensory exam is intact to temperature and light touch bilaterally.  Shoulder shrug and head rotation are intact, the tongue and uvula are midline.    Musculoskeletal exam reveals normal tone, there is no tremor, asterixis, or drift.  Strength is 5/5 throughout.  Reflexes are present throughout though the ankle jerks are diminished bilaterally.  Both toes are downgoing.    She stands slowly, stride length is diminished though she does not shuffle.  There is a bit of an apractic quality, she looks at the ground quite frequently.  Armswing is symmetric though it is diminished bilaterally.  She requires multiple steps to turn.  There is no appendicular dystaxia with any of the extremities.  Repetitive movements are slowed but still symmetric with good amplitude and frequency maintained bilaterally.    Romberg is absent.  Sensory exam is intact to vibration and temperature. "     Assessment & Plan     1. Dementia without behavioral disturbance, unspecified dementia type (HCC)  She does suffer from dementia, progressive over quite a long time.  This will likely prove to be due to Alzheimer's disease.  There were told that she does not likely suffer from Parkinson's disease or Lewy body disease, structural pathology such as stroke, NPH, etc. will be evaluated through her work-up.  Treatment should be instituted sooner rather than later given its moderate severity.  Additional work-up will include MRI of the brain as well as some blood tests to look for the typical causes of cognitive impairment.    I spent some time with the patient and her daughter explaining what dementia entails and its different causes including Alzheimer's disease.  They were told that not all dementia is due to Alzheimer's, but if somebody has Alzheimer's disease then by definition they have dementia.  They understand that there is no diagnostic test to establish Alzheimer's disease, the testing is done to rule out other causes which can potentially be reversible or treated adequately.  In her circumstance medicines will be used to treat the symptoms, mitigating severity for longer intervals, allowing her to remain independent for as long as possible.    They already have kahn of  and advance directives established.  I asked him to bring in copies for the record.    MRI the brain, ESR, SPEP and sed rate will be checked.  We will call them with test results if significant, otherwise we will follow-up in 6.  In the interim, Aricept 5 mg every evening will be started, increased to 10 mg after 1 month.  Side effects were reviewed.  We will maintain contact via the web to adjust medication.    I encouraged her to remain active.  She is becoming more of a couch potato, this was discouraged.  Being physically active is as important as mentally active.  She is eating well, sleep hygiene is being maintained.    -  MR-BRAIN-W/O; Future  - SPEP W/REFLEX TO CARY, A, G, M; Future  - FOLATE; Future  - Sed Rate; Future  - donepezil (ARICEPT) 5 MG Tab; Take 1 Tablet by mouth every evening.  Dispense: 30 Tablet; Refill: 0  - donepezil (ARICEPT) 10 MG tablet; Take 1 Tablet by mouth every evening.  Dispense: 30 Tablet; Refill: 6    Time: 60 minutes spent face-to-face for exam, review, discussion, and education, of this over 50% of the time spent counseling and coordinating care.

## 2021-10-06 DIAGNOSIS — F03.90 DEMENTIA WITHOUT BEHAVIORAL DISTURBANCE, UNSPECIFIED DEMENTIA TYPE: ICD-10-CM

## 2021-10-06 RX ORDER — DONEPEZIL HYDROCHLORIDE 5 MG/1
5 TABLET, FILM COATED ORAL EVERY EVENING
Qty: 30 TABLET | Refills: 3 | Status: SHIPPED | OUTPATIENT
Start: 2021-10-06 | End: 2022-01-06

## 2021-11-22 DIAGNOSIS — E78.49 OTHER HYPERLIPIDEMIA: ICD-10-CM

## 2021-11-22 DIAGNOSIS — I10 ESSENTIAL HYPERTENSION: ICD-10-CM

## 2021-11-22 DIAGNOSIS — R73.03 PREDIABETES: ICD-10-CM

## 2021-11-22 DIAGNOSIS — E03.9 ACQUIRED HYPOTHYROIDISM: ICD-10-CM

## 2021-11-23 RX ORDER — LISINOPRIL 30 MG/1
30 TABLET ORAL
Qty: 100 TABLET | Refills: 1 | Status: SHIPPED | OUTPATIENT
Start: 2021-11-23 | End: 2022-06-04

## 2021-11-23 RX ORDER — ATORVASTATIN CALCIUM 20 MG/1
TABLET, FILM COATED ORAL
Qty: 100 TABLET | Refills: 1 | Status: SHIPPED | OUTPATIENT
Start: 2021-11-23 | End: 2022-06-04

## 2021-11-23 RX ORDER — AMLODIPINE BESYLATE 5 MG/1
5 TABLET ORAL
Qty: 90 TABLET | Refills: 1 | Status: SHIPPED | OUTPATIENT
Start: 2021-11-23 | End: 2022-05-23

## 2021-11-23 RX ORDER — LEVOTHYROXINE SODIUM 0.03 MG/1
25 TABLET ORAL
Qty: 90 TABLET | Refills: 3 | Status: SHIPPED | OUTPATIENT
Start: 2021-11-23 | End: 2022-06-07 | Stop reason: SDUPTHER

## 2022-01-05 ENCOUNTER — HOSPITAL ENCOUNTER (OUTPATIENT)
Dept: RADIOLOGY | Facility: MEDICAL CENTER | Age: 82
End: 2022-01-05
Attending: PSYCHIATRY & NEUROLOGY
Payer: MEDICARE

## 2022-01-05 DIAGNOSIS — F03.90 DEMENTIA WITHOUT BEHAVIORAL DISTURBANCE, UNSPECIFIED DEMENTIA TYPE: ICD-10-CM

## 2022-01-05 PROCEDURE — 70551 MRI BRAIN STEM W/O DYE: CPT | Mod: MG

## 2022-01-06 ENCOUNTER — OFFICE VISIT (OUTPATIENT)
Dept: NEUROLOGY | Facility: MEDICAL CENTER | Age: 82
End: 2022-01-06
Attending: PSYCHIATRY & NEUROLOGY
Payer: MEDICARE

## 2022-01-06 ENCOUNTER — HOSPITAL ENCOUNTER (OUTPATIENT)
Dept: LAB | Facility: MEDICAL CENTER | Age: 82
End: 2022-01-06
Attending: PSYCHIATRY & NEUROLOGY
Payer: MEDICARE

## 2022-01-06 VITALS
SYSTOLIC BLOOD PRESSURE: 132 MMHG | BODY MASS INDEX: 33.46 KG/M2 | HEIGHT: 64 IN | OXYGEN SATURATION: 95 % | RESPIRATION RATE: 12 BRPM | DIASTOLIC BLOOD PRESSURE: 76 MMHG | TEMPERATURE: 97.4 F | WEIGHT: 195.99 LBS | HEART RATE: 71 BPM

## 2022-01-06 DIAGNOSIS — F03.90 DEMENTIA WITHOUT BEHAVIORAL DISTURBANCE, UNSPECIFIED DEMENTIA TYPE: ICD-10-CM

## 2022-01-06 DIAGNOSIS — F02.80 LATE ONSET ALZHEIMER'S DEMENTIA WITHOUT BEHAVIORAL DISTURBANCE (HCC): ICD-10-CM

## 2022-01-06 DIAGNOSIS — G30.1 LATE ONSET ALZHEIMER'S DEMENTIA WITHOUT BEHAVIORAL DISTURBANCE (HCC): ICD-10-CM

## 2022-01-06 LAB
ERYTHROCYTE [SEDIMENTATION RATE] IN BLOOD BY WESTERGREN METHOD: 18 MM/HOUR (ref 0–25)
FOLATE SERPL-MCNC: 13.2 NG/ML

## 2022-01-06 PROCEDURE — 36415 COLL VENOUS BLD VENIPUNCTURE: CPT

## 2022-01-06 PROCEDURE — 99214 OFFICE O/P EST MOD 30 MIN: CPT | Performed by: PSYCHIATRY & NEUROLOGY

## 2022-01-06 PROCEDURE — 84165 PROTEIN E-PHORESIS SERUM: CPT

## 2022-01-06 PROCEDURE — 85652 RBC SED RATE AUTOMATED: CPT

## 2022-01-06 PROCEDURE — 82746 ASSAY OF FOLIC ACID SERUM: CPT

## 2022-01-06 PROCEDURE — 99212 OFFICE O/P EST SF 10 MIN: CPT | Performed by: PSYCHIATRY & NEUROLOGY

## 2022-01-06 PROCEDURE — 84155 ASSAY OF PROTEIN SERUM: CPT

## 2022-01-06 RX ORDER — DONEPEZIL HYDROCHLORIDE 10 MG/1
10 TABLET, FILM COATED ORAL EVERY EVENING
Qty: 90 TABLET | Refills: 3 | Status: SHIPPED | OUTPATIENT
Start: 2022-01-06 | End: 2022-05-26

## 2022-01-06 ASSESSMENT — FIBROSIS 4 INDEX: FIB4 SCORE: 1.05

## 2022-01-06 ASSESSMENT — PATIENT HEALTH QUESTIONNAIRE - PHQ9: CLINICAL INTERPRETATION OF PHQ2 SCORE: 0

## 2022-01-06 ASSESSMENT — ENCOUNTER SYMPTOMS
SEIZURES: 0
INSOMNIA: 0
MEMORY LOSS: 1
FALLS: 0
LOSS OF CONSCIOUSNESS: 0
WEIGHT LOSS: 0

## 2022-01-06 NOTE — PROGRESS NOTES
Subjective     Mireille Gee is a 81 y.o. female who presents for follow-up with her daughter Yolande, with a diagnosis of dementia, likely Alzheimer's disease of moderate severity.  She has now been on Aricept 10 mg every evening for 3 months.    STACEY Alcantar (her preferred salutation) is doing well.  She is aware that she has memory problems, she thinks that she is doing well despite this.  She insists that she does take her medications on her own, Yolande confirms this, she just puts them out in the medication tray.  The patient is still doing kaley.  Her personal organization is actually quite good, she enjoys her routines such as emptying the , but then has difficulty putting things away in the kitchen.  She still has difficulty playing with controls for some of the electronic appliances.  She does remain a bit more withdrawn than usual though she does enjoy socializing with family.  She still has some short-term memory problems, it is quite frustrating for her.  Her daughter states that even with cueing, it is typically in 1 year and out the other.  She does have some difficulty remembering names of people who she has not seen in a while, familiar family members she knows well.  She also remembers no relationships.    At times she will need to be reminded to eat dinner, but she has a routine of getting up in the morning, eating breakfast after showering, etc.  She remains independent with her ADLs.  There have been no problems with incontinence, she walks with a cane only on long distances.  She has not been falling with any kind of regularity.  She sleeps well, they have not had problems with afternoon agitation or delirium, hallucinosis, etc. language function seems to be intact.    On Aricept 10 mg every evening, she is tolerating the drug well, but neither she nor then they will recognize a real change 1 way or the other.    She did undergo MRI scan of the brain yesterday, showing nonspecific  "atherosclerotic changes in the cerebral hemispheres, but otherwise no significant structural pathology.  Unfortunately, they had no opportunity to obtain ESR, CRP and SPEP.    Medical, surgical and family histories are reviewed, there are no new drug allergies.  She is on Synthroid, Norvasc, Prinivil, Lipitor, vitamin D with calcium, baby aspirin, Detrol LA and her Aricept 10 mg every evening.    Review of Systems   Constitutional: Negative for malaise/fatigue and weight loss.   Musculoskeletal: Negative for falls.   Neurological: Negative for seizures and loss of consciousness.   Psychiatric/Behavioral: Positive for memory loss. The patient does not have insomnia.    All other systems reviewed and are negative.    Objective     /76 (BP Location: Right arm, Patient Position: Sitting, BP Cuff Size: Adult)   Pulse 71   Temp 36.3 °C (97.4 °F) (Temporal)   Resp 12   Ht 1.626 m (5' 4\")   Wt 88.9 kg (195 lb 15.8 oz)   SpO2 95%   BMI 33.64 kg/m²      Physical Exam    She appears in no acute distress.  She is clean and appropriately dressed.  She is quite cooperative.  Vital signs are stable.  She engages easily with the examiner when questioned.  She still tends to look at her daughter for answers to many questions, but otherwise she does maintain good eye contact.     Neurological Exam    She is fully oriented, she knows her birthday as well as her age.  There is no aphasia, apraxia, or inattention.  Frontal release signs are absent.    In quick and cursory fashion, with observation, cranial nerve, musculoskeletal and coordination evaluations remain intact.    Assessment & Plan     1. Late onset Alzheimer's dementia without behavioral disturbance (HCC)  We will continue her present regimen.  They were both told that the use of Aricept may not show real improvement initially, we are looking for a sustained benefit over a longer interval of time, symptoms progressing in severity at a slower pace.  We talked " about the results of the MRI scan and the fact that it shows nothing related to the memory problems that she does have.  Then I will bring her mother into get the blood work drawn.    The patient was again told that she has dementia, that the memory problems she has will progress over time.  She was also congratulated on how well she is doing.  She was encouraged to remain active, especially with her crocheting.  She was also encouraged to do other kinds of cognitive exercises as well as becoming more physically active.  We will follow-up in about 7 months.    - donepezil (ARICEPT) 10 MG tablet; Take 1 Tablet by mouth every evening.  Dispense: 90 Tablet; Refill: 3    Time: 30 minutes in total was spent in patient care including precharting, record review, discussions with healthcare staff and documentation.  This included face-to-face time with the patient for exam, review, education, as well as counseling and coordinating care with the patient and her daughter.

## 2022-01-10 LAB
ALBUMIN SERPL ELPH-MCNC: 3.33 G/DL (ref 3.75–5.01)
ALPHA1 GLOB SERPL ELPH-MCNC: 0.36 G/DL (ref 0.19–0.46)
ALPHA2 GLOB SERPL ELPH-MCNC: 0.74 G/DL (ref 0.48–1.05)
B-GLOBULIN SERPL ELPH-MCNC: 0.85 G/DL (ref 0.48–1.1)
GAMMA GLOB SERPL ELPH-MCNC: 1.22 G/DL (ref 0.62–1.51)
INTERPRETATION SERPL IFE-IMP: ABNORMAL
MONOCLON BAND OBS SERPL: ABNORMAL
PATHOLOGY STUDY: ABNORMAL
PROT SERPL-MCNC: 6.5 G/DL (ref 6.3–8.2)

## 2022-06-07 ENCOUNTER — OFFICE VISIT (OUTPATIENT)
Dept: MEDICAL GROUP | Facility: PHYSICIAN GROUP | Age: 82
End: 2022-06-07
Payer: MEDICARE

## 2022-06-07 VITALS
HEART RATE: 69 BPM | OXYGEN SATURATION: 96 % | BODY MASS INDEX: 32.61 KG/M2 | WEIGHT: 191 LBS | TEMPERATURE: 97.2 F | SYSTOLIC BLOOD PRESSURE: 120 MMHG | HEIGHT: 64 IN | DIASTOLIC BLOOD PRESSURE: 64 MMHG | RESPIRATION RATE: 16 BRPM

## 2022-06-07 DIAGNOSIS — N32.81 OVERACTIVE BLADDER: Chronic | ICD-10-CM

## 2022-06-07 DIAGNOSIS — E66.01 SEVERE OBESITY (HCC): ICD-10-CM

## 2022-06-07 DIAGNOSIS — E78.49 OTHER HYPERLIPIDEMIA: ICD-10-CM

## 2022-06-07 DIAGNOSIS — I10 ESSENTIAL HYPERTENSION: ICD-10-CM

## 2022-06-07 DIAGNOSIS — F02.80 LATE ONSET ALZHEIMER'S DEMENTIA WITHOUT BEHAVIORAL DISTURBANCE (HCC): ICD-10-CM

## 2022-06-07 DIAGNOSIS — G30.1 LATE ONSET ALZHEIMER'S DEMENTIA WITHOUT BEHAVIORAL DISTURBANCE (HCC): ICD-10-CM

## 2022-06-07 DIAGNOSIS — E03.9 ACQUIRED HYPOTHYROIDISM: ICD-10-CM

## 2022-06-07 DIAGNOSIS — N18.31 STAGE 3A CHRONIC KIDNEY DISEASE: ICD-10-CM

## 2022-06-07 DIAGNOSIS — I10 PRIMARY HYPERTENSION: ICD-10-CM

## 2022-06-07 DIAGNOSIS — E55.9 VITAMIN D DEFICIENCY: ICD-10-CM

## 2022-06-07 DIAGNOSIS — E78.5 DYSLIPIDEMIA: ICD-10-CM

## 2022-06-07 DIAGNOSIS — I65.23 BILATERAL CAROTID ARTERY STENOSIS: ICD-10-CM

## 2022-06-07 DIAGNOSIS — R73.03 PREDIABETES: ICD-10-CM

## 2022-06-07 PROBLEM — I65.29 CAROTID ARTERY STENOSIS: Chronic | Status: ACTIVE | Noted: 2020-10-19

## 2022-06-07 PROCEDURE — G0439 PPPS, SUBSEQ VISIT: HCPCS | Performed by: INTERNAL MEDICINE

## 2022-06-07 PROCEDURE — 8041 PR SCP AHA: Performed by: INTERNAL MEDICINE

## 2022-06-07 RX ORDER — DONEPEZIL HYDROCHLORIDE 10 MG/1
10 TABLET, FILM COATED ORAL EVERY EVENING
Qty: 90 TABLET | Refills: 3 | Status: SHIPPED | OUTPATIENT
Start: 2022-06-07 | End: 2023-01-12 | Stop reason: SDUPTHER

## 2022-06-07 RX ORDER — ATORVASTATIN CALCIUM 20 MG/1
TABLET, FILM COATED ORAL
Qty: 90 TABLET | Refills: 3 | Status: SHIPPED | OUTPATIENT
Start: 2022-06-07 | End: 2022-09-19

## 2022-06-07 RX ORDER — AMLODIPINE BESYLATE 5 MG/1
5 TABLET ORAL
Qty: 90 TABLET | Refills: 3 | Status: SHIPPED | OUTPATIENT
Start: 2022-06-07 | End: 2023-06-21 | Stop reason: SDUPTHER

## 2022-06-07 RX ORDER — LEVOTHYROXINE SODIUM 0.03 MG/1
25 TABLET ORAL
Qty: 90 TABLET | Refills: 3 | Status: SHIPPED | OUTPATIENT
Start: 2022-06-07 | End: 2022-12-05

## 2022-06-07 RX ORDER — LISINOPRIL 30 MG/1
30 TABLET ORAL
Qty: 90 TABLET | Refills: 3 | Status: SHIPPED | OUTPATIENT
Start: 2022-06-07 | End: 2022-09-19

## 2022-06-07 RX ORDER — ERGOCALCIFEROL 1.25 MG/1
50000 CAPSULE ORAL
Qty: 15 CAPSULE | Refills: 3 | Status: SHIPPED | OUTPATIENT
Start: 2022-06-07 | End: 2023-01-12

## 2022-06-07 ASSESSMENT — PATIENT HEALTH QUESTIONNAIRE - PHQ9: CLINICAL INTERPRETATION OF PHQ2 SCORE: 0

## 2022-06-07 ASSESSMENT — ENCOUNTER SYMPTOMS: GENERAL WELL-BEING: GOOD

## 2022-06-07 ASSESSMENT — ACTIVITIES OF DAILY LIVING (ADL): BATHING_REQUIRES_ASSISTANCE: 0

## 2022-06-07 NOTE — ASSESSMENT & PLAN NOTE
This is a chronic condition.  The patient presently taking Aricept daily.  No significant side effects reported.  Stable condition according to patient's daughter who is here accompanied the patient

## 2022-06-07 NOTE — ASSESSMENT & PLAN NOTE
Chronic condition.  Previous GFR in the 50s.  Lab tests ordered for follow-up.  Advised the patient to avoid NSAIDs

## 2022-06-07 NOTE — ASSESSMENT & PLAN NOTE
Previous ultrasound completed 2020.  I have ordered a repeat carotid ultrasound to be done for follow-up.  Patient is presently asymptomatic.

## 2022-06-07 NOTE — ASSESSMENT & PLAN NOTE
Chronic condition.  The patient is on amlodipine and lisinopril.  No significant side effects reported.  Blood pressure stable: The patient's report

## 2022-06-07 NOTE — ASSESSMENT & PLAN NOTE
Body mass index is 32.79 kg/m².   Brief discussion with the patient regarding diet, exercise, and lifestyle modification to help achieve and maintain healthy weight

## 2022-06-23 ENCOUNTER — TELEPHONE (OUTPATIENT)
Dept: HEALTH INFORMATION MANAGEMENT | Facility: OTHER | Age: 82
End: 2022-06-23

## 2022-08-11 ENCOUNTER — OFFICE VISIT (OUTPATIENT)
Dept: NEUROLOGY | Facility: MEDICAL CENTER | Age: 82
End: 2022-08-11
Attending: PSYCHIATRY & NEUROLOGY
Payer: MEDICARE

## 2022-08-11 VITALS
SYSTOLIC BLOOD PRESSURE: 142 MMHG | DIASTOLIC BLOOD PRESSURE: 64 MMHG | WEIGHT: 186.51 LBS | OXYGEN SATURATION: 93 % | HEIGHT: 64 IN | BODY MASS INDEX: 31.84 KG/M2 | HEART RATE: 64 BPM | TEMPERATURE: 97 F

## 2022-08-11 DIAGNOSIS — G30.1 LATE ONSET ALZHEIMER'S DEMENTIA WITHOUT BEHAVIORAL DISTURBANCE (HCC): ICD-10-CM

## 2022-08-11 DIAGNOSIS — F02.80 LATE ONSET ALZHEIMER'S DEMENTIA WITHOUT BEHAVIORAL DISTURBANCE (HCC): ICD-10-CM

## 2022-08-11 PROCEDURE — 99212 OFFICE O/P EST SF 10 MIN: CPT | Performed by: PSYCHIATRY & NEUROLOGY

## 2022-08-11 PROCEDURE — 99213 OFFICE O/P EST LOW 20 MIN: CPT | Performed by: PSYCHIATRY & NEUROLOGY

## 2022-08-11 ASSESSMENT — ENCOUNTER SYMPTOMS
NERVOUS/ANXIOUS: 0
MEMORY LOSS: 1
INSOMNIA: 0
FALLS: 0
WEIGHT LOSS: 0
HALLUCINATIONS: 0

## 2022-08-11 NOTE — PROGRESS NOTES
Subjective     Katharine Gee is a 81 y.o. female who presents with her daughter Yolande, as always, for follow-up, with a history of Alzheimer's disease.  The patient gives portions of the history, her daughter adds color.    HPI    Katharine states that she is doing well, she does recognize that she has memory problems, but does not recall the name of the illness that she has.  The forgetfulness is still there, the family has gotten used to the frequent reminders that her part of her life.  She is complete with her daily routines, she has not gotten into trouble with leaving the stove on, water running, etc. This year she actually forgot both her daughters birthdays.  Language function seems to be maintained well, there is some word finding hesitancy, she is a little bit more perseverative in conversation.    They have noted she is becoming a bit more anxious in public gatherings, at home she will simply retire to her room, if they are out and about, she will often asked to leave earlier.  There have been no problems with agitation, hallucination, etc.  She is not paranoid.  She sleeps well.  She is also forgetting to eat lunch, her daughter is making her sandwich and leaving it out for her.  At least this way they have a check to see if the patient has actually eaten.    Balance is not affected.  Bowel and bladder control are maintained.  She is still independent with her ADLs.  In fact, she is for the most part taking care of all of her medications appropriately.  Mood is stable, Katharine denies feelings of depression or anxiety.    Medical, surgical and family histories are reviewed, there are no new drug allergies.  She remains on Aricept 10 mg daily, taking all of her other medications in the morning, which include Synthroid, Norvasc, Prinivil, Lipitor, baby aspirin, Detrol LA and vitamin D.    Review of Systems   Constitutional:  Negative for weight loss.   Musculoskeletal:  Negative for falls.  "  Psychiatric/Behavioral:  Positive for memory loss. Negative for hallucinations. The patient is not nervous/anxious and does not have insomnia.    All other systems reviewed and are negative.    Objective     BP (!) 142/64   Pulse 64   Temp 36.1 °C (97 °F) (Temporal)   Ht 1.626 m (5' 4\")   Wt 84.6 kg (186 lb 8.2 oz)   SpO2 93%   BMI 32.01 kg/m²      Physical Exam    She appears in no acute distress.  Clean and appropriately dressed, she is quite cooperative.  Vital signs are stable.  There is no malar rash.  The neck is supple.  Cardiac evaluation is unremarkable.     Neurological Exam    She is partially oriented, she actually has difficulty juggling today's date which is a day after her daughter's birthday on August 10.  She does know the year, month, day of the week as well as our location.  With reminder she still has difficulty remembering the actual date.  She names and repeats easily, there is no apraxia but there is now some hand-object substitution.  Frontal release signs are absent.    PERRLA/EOMI, visual fields are grossly full, facial movements are symmetric, there is no hypophonia or dysarthria.  Sensory exam is intact to temperature and light touch.  Shoulder shrug and head rotation are normal.    Musculoskeletal exam reveals intact strength, there is no tremor.  Tone is normal.    She stands easily, gait is normal and station and stride length, though she does walk a bit more slowly and cautiously.  There is no apraxia.  There is no appendicular dystaxia.  Fine motor control with the hands is intact.    Sensory exam is intact to vibration.    Assessment & Plan     1. 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  The family is accommodating well with the patient's difficulties.  They were told about the increased anxiety with larger groups, itself not unheard of, and usually easily dealt with by " distracting or removing the patient from the circumstances.  Forgetting to eat meals is also a common symptom of the disorder, they seem to be adapting well to this.  We will continue Aricept.  Follow-up in 6 months.     Time: 20 minutes in total spent on patient care including precharting, record review, discussion with healthcare staff and documentation.  This includes face-to-face time for exam, review, discussion, as well as counseling and coordinating care.

## 2022-09-19 DIAGNOSIS — R73.03 PREDIABETES: ICD-10-CM

## 2022-09-19 DIAGNOSIS — E78.49 OTHER HYPERLIPIDEMIA: ICD-10-CM

## 2022-09-19 DIAGNOSIS — I10 ESSENTIAL HYPERTENSION: ICD-10-CM

## 2022-09-19 RX ORDER — LISINOPRIL 30 MG/1
30 TABLET ORAL
Qty: 100 TABLET | Refills: 3 | Status: SHIPPED | OUTPATIENT
Start: 2022-09-19 | End: 2023-11-10 | Stop reason: SDUPTHER

## 2022-09-19 RX ORDER — ATORVASTATIN CALCIUM 20 MG/1
TABLET, FILM COATED ORAL
Qty: 100 TABLET | Refills: 3 | Status: SHIPPED | OUTPATIENT
Start: 2022-09-19 | End: 2023-10-16

## 2022-09-27 ENCOUNTER — APPOINTMENT (OUTPATIENT)
Dept: RADIOLOGY | Facility: MEDICAL CENTER | Age: 82
End: 2022-09-27
Attending: EMERGENCY MEDICINE
Payer: MEDICARE

## 2022-09-27 ENCOUNTER — HOSPITAL ENCOUNTER (EMERGENCY)
Facility: MEDICAL CENTER | Age: 82
End: 2022-09-27
Attending: EMERGENCY MEDICINE
Payer: MEDICARE

## 2022-09-27 VITALS
OXYGEN SATURATION: 93 % | RESPIRATION RATE: 14 BRPM | HEIGHT: 64 IN | HEART RATE: 59 BPM | TEMPERATURE: 97.5 F | WEIGHT: 189.82 LBS | DIASTOLIC BLOOD PRESSURE: 67 MMHG | BODY MASS INDEX: 32.41 KG/M2 | SYSTOLIC BLOOD PRESSURE: 149 MMHG

## 2022-09-27 DIAGNOSIS — N39.0 ACUTE UTI: ICD-10-CM

## 2022-09-27 DIAGNOSIS — R41.0 CONFUSION: ICD-10-CM

## 2022-09-27 LAB
AMMONIA PLAS-SCNC: 28 UMOL/L (ref 11–45)
ANION GAP SERPL CALC-SCNC: 10 MMOL/L (ref 7–16)
APPEARANCE UR: CLEAR
BACTERIA #/AREA URNS HPF: NEGATIVE /HPF
BASOPHILS # BLD AUTO: 0.5 % (ref 0–1.8)
BASOPHILS # BLD: 0.04 K/UL (ref 0–0.12)
BILIRUB UR QL STRIP.AUTO: NEGATIVE
BUN SERPL-MCNC: 20 MG/DL (ref 8–22)
CALCIUM SERPL-MCNC: 9.8 MG/DL (ref 8.5–10.5)
CHLORIDE SERPL-SCNC: 108 MMOL/L (ref 96–112)
CO2 SERPL-SCNC: 23 MMOL/L (ref 20–33)
COLOR UR: YELLOW
CREAT SERPL-MCNC: 0.76 MG/DL (ref 0.5–1.4)
EOSINOPHIL # BLD AUTO: 0.09 K/UL (ref 0–0.51)
EOSINOPHIL NFR BLD: 1.2 % (ref 0–6.9)
EPI CELLS #/AREA URNS HPF: ABNORMAL /HPF
ERYTHROCYTE [DISTWIDTH] IN BLOOD BY AUTOMATED COUNT: 49.5 FL (ref 35.9–50)
GFR SERPLBLD CREATININE-BSD FMLA CKD-EPI: 77 ML/MIN/1.73 M 2
GLUCOSE SERPL-MCNC: 93 MG/DL (ref 65–99)
GLUCOSE UR STRIP.AUTO-MCNC: NEGATIVE MG/DL
HCT VFR BLD AUTO: 42.4 % (ref 37–47)
HGB BLD-MCNC: 13.6 G/DL (ref 12–16)
HYALINE CASTS #/AREA URNS LPF: ABNORMAL /LPF
IMM GRANULOCYTES # BLD AUTO: 0.03 K/UL (ref 0–0.11)
IMM GRANULOCYTES NFR BLD AUTO: 0.4 % (ref 0–0.9)
KETONES UR STRIP.AUTO-MCNC: ABNORMAL MG/DL
LEUKOCYTE ESTERASE UR QL STRIP.AUTO: ABNORMAL
LYMPHOCYTES # BLD AUTO: 2.39 K/UL (ref 1–4.8)
LYMPHOCYTES NFR BLD: 31.5 % (ref 22–41)
MCH RBC QN AUTO: 28.5 PG (ref 27–33)
MCHC RBC AUTO-ENTMCNC: 32.1 G/DL (ref 33.6–35)
MCV RBC AUTO: 88.7 FL (ref 81.4–97.8)
MICRO URNS: ABNORMAL
MONOCYTES # BLD AUTO: 0.57 K/UL (ref 0–0.85)
MONOCYTES NFR BLD AUTO: 7.5 % (ref 0–13.4)
NEUTROPHILS # BLD AUTO: 4.46 K/UL (ref 2–7.15)
NEUTROPHILS NFR BLD: 58.9 % (ref 44–72)
NITRITE UR QL STRIP.AUTO: NEGATIVE
NRBC # BLD AUTO: 0 K/UL
NRBC BLD-RTO: 0 /100 WBC
PH UR STRIP.AUTO: 6.5 [PH] (ref 5–8)
PLATELET # BLD AUTO: 252 K/UL (ref 164–446)
PMV BLD AUTO: 9.3 FL (ref 9–12.9)
POTASSIUM SERPL-SCNC: 4.5 MMOL/L (ref 3.6–5.5)
PROT UR QL STRIP: NEGATIVE MG/DL
RBC # BLD AUTO: 4.78 M/UL (ref 4.2–5.4)
RBC # URNS HPF: ABNORMAL /HPF
RBC UR QL AUTO: NEGATIVE
SODIUM SERPL-SCNC: 141 MMOL/L (ref 135–145)
SP GR UR STRIP.AUTO: 1.02
TSH SERPL DL<=0.005 MIU/L-ACNC: 4.64 UIU/ML (ref 0.38–5.33)
UROBILINOGEN UR STRIP.AUTO-MCNC: 1 MG/DL
WBC # BLD AUTO: 7.6 K/UL (ref 4.8–10.8)
WBC #/AREA URNS HPF: ABNORMAL /HPF

## 2022-09-27 PROCEDURE — 80048 BASIC METABOLIC PNL TOTAL CA: CPT

## 2022-09-27 PROCEDURE — 81001 URINALYSIS AUTO W/SCOPE: CPT

## 2022-09-27 PROCEDURE — 36415 COLL VENOUS BLD VENIPUNCTURE: CPT

## 2022-09-27 PROCEDURE — 99284 EMERGENCY DEPT VISIT MOD MDM: CPT

## 2022-09-27 PROCEDURE — 700102 HCHG RX REV CODE 250 W/ 637 OVERRIDE(OP): Performed by: EMERGENCY MEDICINE

## 2022-09-27 PROCEDURE — 85025 COMPLETE CBC W/AUTO DIFF WBC: CPT

## 2022-09-27 PROCEDURE — 84443 ASSAY THYROID STIM HORMONE: CPT

## 2022-09-27 PROCEDURE — A9270 NON-COVERED ITEM OR SERVICE: HCPCS | Performed by: EMERGENCY MEDICINE

## 2022-09-27 PROCEDURE — 82140 ASSAY OF AMMONIA: CPT

## 2022-09-27 PROCEDURE — 70450 CT HEAD/BRAIN W/O DYE: CPT

## 2022-09-27 RX ORDER — CEFDINIR 300 MG/1
300 CAPSULE ORAL 2 TIMES DAILY
Qty: 20 CAPSULE | Refills: 0 | Status: SHIPPED | OUTPATIENT
Start: 2022-09-27 | End: 2022-12-14

## 2022-09-27 RX ORDER — CEFDINIR 300 MG/1
300 CAPSULE ORAL ONCE
Status: COMPLETED | OUTPATIENT
Start: 2022-09-27 | End: 2022-09-27

## 2022-09-27 RX ADMIN — CEFDINIR 300 MG: 300 CAPSULE ORAL at 23:27

## 2022-09-28 DIAGNOSIS — G30.1 LATE ONSET ALZHEIMER'S DEMENTIA WITH BEHAVIORAL DISTURBANCE (HCC): ICD-10-CM

## 2022-09-28 DIAGNOSIS — F02.818 LATE ONSET ALZHEIMER'S DEMENTIA WITH BEHAVIORAL DISTURBANCE (HCC): ICD-10-CM

## 2022-09-28 RX ORDER — QUETIAPINE FUMARATE 25 MG/1
TABLET, FILM COATED ORAL
Qty: 70 TABLET | Refills: 0 | Status: SHIPPED | OUTPATIENT
Start: 2022-09-28 | End: 2022-11-09

## 2022-09-28 NOTE — ED PROVIDER NOTES
ED Provider Note    CHIEF COMPLAINT  Chief Complaint   Patient presents with    Altered Mental Status    Failure to Thrive       HPI  Katharine Gee is a 81 y.o. female who presents to the emergency room for altar middle status. Past medical history as document below. Patient with known dementia which is seemingly worsening more progressively than had previously been anticipated over the last month. Family at bedside explained they been working with primary care physician as well as neurology. Due to the more rapid decline over the last month they were ultimately prompted to go to the ER to rule out any further alternative possibilities of altered mental status before assuming that it was worsening dementia. Family note that the patient has otherwise been at her baseline other than the increased confusion. She has not had any known or obvious illness or infections. No recent medication changes or additions or subtractions. No trauma. No falls.    REVIEW OF SYSTEMS  See HPI for further details. All other systems are negative.     PAST MEDICAL HISTORY   has a past medical history of Acquired hypothyroidism (7/18/2017), Arthritis, Carotid artery stenosis (10/19/2020), HTN (hypertension), Hypertension, Kidney stone, Memory loss (10/4/2017), Menopause (2/2/2015), Migraine, Obesity (BMI 30-39.9) (4/5/2018), and Polymyalgia rheumatica (HCC).    SOCIAL HISTORY  Social History     Tobacco Use    Smoking status: Never    Smokeless tobacco: Never   Vaping Use    Vaping Use: Never used   Substance and Sexual Activity    Alcohol use: Yes     Comment: very seldomly drinks    Drug use: No    Sexual activity: Not Currently       SURGICAL HISTORY   has a past surgical history that includes revise secondary varicosity; exploratory laparotomy (7/3/2014); bowel resection (7/3/2014); tonsillectomy; and vaginal hysterectomy total.    CURRENT MEDICATIONS  Home Medications    **Home medications have not yet been reviewed for  "this encounter**         ALLERGIES  No Known Allergies    PHYSICAL EXAM  VITAL SIGNS: BP (!) 159/60   Pulse 77   Temp 36.3 °C (97.4 °F) (Oral)   Resp 16   Ht 1.626 m (5' 4\")   Wt 86.1 kg (189 lb 13.1 oz)   SpO2 96%   BMI 32.58 kg/m²  @[email or web address][[id number]::@  Pulse ox interpretation: I interpret this pulse ox as normal.  Constitutional: Alert in no apparent distress.  HENT: Normocephalic, Atraumatic, Bilateral external ears normal. Nose normal.   Eyes: Pupils are equal and reactive. Conjunctiva normal, non-icteric.   Heart: Regular rate and rythm, no murmurs.    Lungs: Clear to auscultation bilaterally.  Skin: Warm, Dry, No erythema, No rash.   Neurologic: Alert, Grossly non-focal. AOx3  Psychiatric: Affect normal, Judgment normal, Mood normal, Appears appropriate and not intoxicated.     Results for orders placed or performed during the hospital encounter of 09/27/22   CBC WITH DIFFERENTIAL   Result Value Ref Range    WBC 7.6 4.8 - 10.8 K/uL    RBC 4.78 4.20 - 5.40 M/uL    Hemoglobin 13.6 12.0 - 16.0 g/dL    Hematocrit 42.4 37.0 - 47.0 %    MCV 88.7 81.4 - 97.8 fL    MCH 28.5 27.0 - 33.0 pg    MCHC 32.1 (L) 33.6 - 35.0 g/dL    RDW 49.5 35.9 - 50.0 fL    Platelet Count 252 164 - 446 K/uL    MPV 9.3 9.0 - 12.9 fL    Neutrophils-Polys 58.90 44.00 - 72.00 %    Lymphocytes 31.50 22.00 - 41.00 %    Monocytes 7.50 0.00 - 13.40 %    Eosinophils 1.20 0.00 - 6.90 %    Basophils 0.50 0.00 - 1.80 %    Immature Granulocytes 0.40 0.00 - 0.90 %    Nucleated RBC 0.00 /100 WBC    Neutrophils (Absolute) 4.46 2.00 - 7.15 K/uL    Lymphs (Absolute) 2.39 1.00 - 4.80 K/uL    Monos (Absolute) 0.57 0.00 - 0.85 K/uL    Eos (Absolute) 0.09 0.00 - 0.51 K/uL    Baso (Absolute) 0.04 0.00 - 0.12 K/uL    Immature Granulocytes (abs) 0.03 0.00 - 0.11 K/uL    NRBC (Absolute) 0.00 K/uL   BASIC METABOLIC PANEL   Result Value Ref Range    Sodium 141 135 - 145 mmol/L    Potassium 4.5 3.6 - 5.5 mmol/L    Chloride 108 96 - 112 mmol/L    Co2 23 20 - 33 mmol/L "    Glucose 93 65 - 99 mg/dL    Bun 20 8 - 22 mg/dL    Creatinine 0.76 0.50 - 1.40 mg/dL    Calcium 9.8 8.5 - 10.5 mg/dL    Anion Gap 10.0 7.0 - 16.0   TSH WITH REFLEX TO FT4   Result Value Ref Range    TSH 4.640 0.380 - 5.330 uIU/mL   AMMONIA   Result Value Ref Range    Ammonia 28 11 - 45 umol/L   URINALYSIS    Specimen: Urine, Clean Catch   Result Value Ref Range    Color Yellow     Character Clear     Specific Gravity 1.022 <1.035    Ph 6.5 5.0 - 8.0    Glucose Negative Negative mg/dL    Ketones Trace (A) Negative mg/dL    Protein Negative Negative mg/dL    Bilirubin Negative Negative    Urobilinogen, Urine 1.0 Negative    Nitrite Negative Negative    Leukocyte Esterase Small (A) Negative    Occult Blood Negative Negative    Micro Urine Req Microscopic    URINE MICROSCOPIC (W/UA)   Result Value Ref Range    WBC 5-10 (A) /hpf    RBC 2-5 (A) /hpf    Bacteria Negative None /hpf    Epithelial Cells Few /hpf    Hyaline Cast 0-2 /lpf   ESTIMATED GFR   Result Value Ref Range    GFR (CKD-EPI) 77 >60 mL/min/1.73 m 2           COURSE & MEDICAL DECISION MAKING  Pertinent Labs & Imaging studies reviewed. (See chart for details)    81-year-old female presented the emergency department at the direction of PCP and neurology. Family at bedside providing primary history. Patient is currently however oriented times three and appropriate. No acute complaints. There is scant evidence of urinary tract infection which I doubt is the causative factor of her overall clinical decline over the last 3 to 4 weeks by will initiate empiric therapy for this in the interim. I strongly recommend that the family continue outpatient care workup with PCP and neurology and I do believe that she is likely had worsening in her underlying dementia as previously diagnosed.      The patient will return for worsening symptoms and is stable at the time of discharge. The patient verbalizes understanding and will comply.    FINAL IMPRESSION  1. Acute UTI                Electronically signed by: Hermes Mata M.D., 9/27/2022 10:57 PM

## 2022-09-28 NOTE — ED NOTES
Pt ambulated to GREEN 25 with a slow and steady gait. C/C is Altered mental status reported by Pt's daughter. Pt is on the monitor and call light within reach. Chart up for ERP.

## 2022-09-28 NOTE — ED TRIAGE NOTES
Amb to triage w/ daughter.  Daughter advised to bring her mother into the ER by her Neurologist and PMD.  Patient has a hx of dementia, diagnosed 1 year ago.  Daughter reports that over the last 4-5 weeks there has been a rapid decline w/ patients confusion.  States patient isn't recognizing family members, recalling family members that have , sometimes her words make no sense, patient is becoming angry. Patient has no c/o at this time.

## 2022-09-28 NOTE — ED NOTES
"Pt medicated per MAR with abx    Pt discharged home with daughters. Pt in possession of belongings. Pt provided discharge education and information pertaining to medications and follow up appointments. Pt received copy of discharge instructions and verbalized understanding. BP (!) 149/67   Pulse (!) 59   Temp 36.4 °C (97.5 °F) (Temporal)   Resp 14   Ht 1.626 m (5' 4\")   Wt 86.1 kg (189 lb 13.1 oz)   SpO2 93%   BMI 32.58 kg/m²     "

## 2022-10-03 ENCOUNTER — OFFICE VISIT (OUTPATIENT)
Dept: MEDICAL GROUP | Facility: PHYSICIAN GROUP | Age: 82
End: 2022-10-03
Payer: MEDICARE

## 2022-10-03 VITALS
BODY MASS INDEX: 32.1 KG/M2 | TEMPERATURE: 97.9 F | DIASTOLIC BLOOD PRESSURE: 64 MMHG | OXYGEN SATURATION: 96 % | WEIGHT: 188 LBS | HEART RATE: 74 BPM | SYSTOLIC BLOOD PRESSURE: 124 MMHG | RESPIRATION RATE: 14 BRPM | HEIGHT: 64 IN

## 2022-10-03 DIAGNOSIS — N32.81 OVERACTIVE BLADDER: Chronic | ICD-10-CM

## 2022-10-03 DIAGNOSIS — E78.5 DYSLIPIDEMIA: ICD-10-CM

## 2022-10-03 DIAGNOSIS — F02.818 LATE ONSET ALZHEIMER'S DEMENTIA WITH BEHAVIORAL DISTURBANCE (HCC): ICD-10-CM

## 2022-10-03 DIAGNOSIS — E03.9 ACQUIRED HYPOTHYROIDISM: Chronic | ICD-10-CM

## 2022-10-03 DIAGNOSIS — E55.9 VITAMIN D DEFICIENCY: ICD-10-CM

## 2022-10-03 DIAGNOSIS — I10 PRIMARY HYPERTENSION: Chronic | ICD-10-CM

## 2022-10-03 DIAGNOSIS — N18.31 STAGE 3A CHRONIC KIDNEY DISEASE: Chronic | ICD-10-CM

## 2022-10-03 DIAGNOSIS — G30.1 LATE ONSET ALZHEIMER'S DEMENTIA WITH BEHAVIORAL DISTURBANCE (HCC): ICD-10-CM

## 2022-10-03 DIAGNOSIS — N30.00 ACUTE CYSTITIS WITHOUT HEMATURIA: ICD-10-CM

## 2022-10-03 PROBLEM — N39.0 UTI (URINARY TRACT INFECTION): Status: ACTIVE | Noted: 2022-10-03

## 2022-10-03 PROCEDURE — 99215 OFFICE O/P EST HI 40 MIN: CPT | Performed by: INTERNAL MEDICINE

## 2022-10-04 NOTE — ASSESSMENT & PLAN NOTE
This is a chronic condition.  The patient is taking atorvastatin.  The patient is due for blood test

## 2022-10-04 NOTE — ASSESSMENT & PLAN NOTE
The patient was taken to the ER recently due to a rather sudden change in her condition  CT scan of the brain was done in ER.  Result discussed with the patient and her 2 daughters who accompany the patient today 1 of whom is the caregiver    CT-HEAD W/O  Narrative: 9/27/2022 9:33 PM    HISTORY/REASON FOR EXAM: aloc    TECHNIQUE/EXAM DESCRIPTION:  CT of the head without contrast.    Sequential axial images were obtained from the vertex to the skull base without contrast.    Up to date radiation dose reduction adjustments have been utilized to meet ALARA standards for radiation dose reduction.    COMPARISON: April 1, 2021    FINDINGS:    The brain appears normal in volume and morphology. The ventricles are normal in caliber and configuration. No space occupying lesions or areas of acute vascular territory infarctions are identified. There are no abnormal extra axial fluid collections or   extra axial hemorrhage identified.    The visualized paranasal sinuses and mastoid air cells are well aerated bilaterally. No depressed calvarial fractures are identified. The visualized globes and retrobulbar soft tissues appear within normal limits.  Atherosclerotic intracranial   calcifications are seen.  Impression: 1.  No acute intracranial abnormality.  2.  Atherosclerosis.    Recent lab test including CBC chemistry which were unremarkable.  UA mildly abnormal.  Patient was started on antibiotic cefdinir.    Of note the caregiver informed me that patient received a prescription for Seroquel recently however the patient has not started think the medication as the daughter was concerned about the possible side effects.  The patients daughter stated that she will discuss with the neurologist with the upcoming appointment

## 2022-10-04 NOTE — PROGRESS NOTES
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  Result discussed with the patient and her 2 daughters who accompany the patient today 1 of whom is the caregiver    CT-HEAD W/O  Narrative: 9/27/2022 9:33 PM    HISTORY/REASON FOR EXAM: aloc    TECHNIQUE/EXAM DESCRIPTION:  CT of the head without contrast.    Sequential axial images were obtained from the vertex to the skull base without contrast.    Up to date radiation dose reduction adjustments have been utilized to meet ALARA standards for radiation dose reduction.    COMPARISON: April 1, 2021    FINDINGS:    The brain appears normal in volume and morphology. The ventricles are normal in caliber and configuration. No space occupying lesions or areas of acute vascular territory infarctions are identified. There are no abnormal extra axial fluid collections or   extra axial hemorrhage identified.    The visualized paranasal sinuses and mastoid air cells are well aerated bilaterally. No depressed calvarial fractures are identified. The visualized globes and retrobulbar soft tissues appear within normal limits.  Atherosclerotic intracranial   calcifications are seen.  Impression: 1.  No acute intracranial abnormality.  2.  Atherosclerosis.    Recent lab test including CBC chemistry which were unremarkable.  UA mildly abnormal.  Patient was started on antibiotic cefdinir.    Of note the caregiver informed me that patient received a prescription for Seroquel recently however the patient has not started think the medication as the daughter was concerned about the possible side effects.  The patients daughter stated that she will discuss with the neurologist with the upcoming  appointment      Acquired hypothyroidism  Chronic condition.  The patient is presently taking levothyroxine.  Recent TSH is within the normal range.    CKD (chronic kidney disease) stage 3, GFR 30-59 ml/min ([facility])  This is a chronic condition.  Recent blood test in the ER showed improved GFR 77    Dyslipidemia  This is a chronic condition.  The patient is taking atorvastatin.  The patient is due for blood test    HTN (hypertension)  Chronic condition patient is taking amlodipine and lisinopril.  Her blood pressure has been well controlled.  No significant side effects reported.    Overactive bladder  Chronic stable condition.  The patient followed by urology.  She takes Detrol LA.  No significant side effects reported.    Current Outpatient Medications on File Prior to Visit   Medication Sig Dispense Refill    QUEtiapine (SEROQUEL) 25 MG Tab Take 1 Tablet by mouth every evening for 14 days, THEN 2 Tablets every evening for 28 days. 70 Tablet 0    cefdinir (OMNICEF) 300 MG Cap Take 1 Capsule by mouth 2 times a day. 20 Capsule 0    lisinopril (PRINIVIL) 30 MG tablet TAKE 1 TABLET BY MOUTH EVERY  Tablet 3    atorvastatin (LIPITOR) 20 MG Tab TAKE 1 TABLET BY MOUTH EVERY DAY AT BEDTIME 100 Tablet 3    amLODIPine (NORVASC) 5 MG Tab Take 1 Tablet by mouth every day. 90 Tablet 3    donepezil (ARICEPT) 10 MG tablet Take 1 Tablet by mouth every evening. 90 Tablet 3    ergocalciferol (DRISDOL) 39133 UNIT capsule Take 1 Capsule by mouth every 7 days. 15 Capsule 3    levothyroxine (SYNTHROID) 25 MCG Tab Take 1 Tablet by mouth every morning on an empty stomach. 90 Tablet 3    tolterodine ER (DETROL LA) 4 MG CAPSULE SR 24 HR Take 4 mg by mouth every day.      aspirin 81 MG tablet Take 81 mg by mouth every day.       No current facility-administered medications on file prior to visit.        Allergies: Patient has no known allergies.    ROS  As per HPI above. All other systems reviewed and negative.      Past Medical, Social,  "and Family history reviewed and updated in EPIC     Objective     /64 (BP Location: Left arm, Patient Position: Sitting, BP Cuff Size: Adult)   Pulse 74   Temp 36.6 °C (97.9 °F) (Temporal)   Resp 14   Ht 1.626 m (5' 4\")   Wt 85.3 kg (188 lb)   SpO2 96%    Body mass index is 32.27 kg/m².    General: alert in no apparent distress.  Cardiovascular: regular rate and rhythm  Pulmonary: lungs : no wheezing   Gastrointestinal: BS present. No obvious mass noted          Assessment and Plan     1. Acute cystitis without hematuria  Advised the patient to complete the antibiotic that repeat the urine culture to ensure the infection is completely treated.  Advised to stay well-hydrated.  - URINE CULTURE(NEW); Future    2. Vitamin D deficiency  - VITAMIN D,25 HYDROXY (DEFICIENCY); Future    3. Dyslipidemia  - Lipid Profile; Future  Continue atorvastatin.  4. 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  They will discuss with neurologist with the upcoming appointment.    5. Acquired hypothyroidism  Chronic stable condition.  Continue with levothyroxine.  6. Stage 3a chronic kidney disease (HCC)  GFR has improved.  Continue to monitor.  7. Primary hypertension  BP stable.  Continue with amlodipine and lisinopril.  8. Overactive bladder  This is a chronic and stable condition.  Continue with Detrol       Total time:   42    min -  That includes chart review before the visit, the actual patient visit, and time spent on documentation after the visit.  Chart review/prep, review of other providers' records, imaging/lab review, face-to-face time for history/examination, ordering, prescribing,  review of results/meds/ treatment plan with patient, documentation in EMR, care coordination.     Total time:  42  min -  That " includes chart review before the visit, the actual patient visit, and time spent on documentation after the visit.  Chart review/prep, review of other providers' records, imaging/lab review, face-to-face time for history/examination, ordering, prescribing,  review of results/meds/ treatment plan with patient, documentation in EMR, care coordination.               Healthcare Maintenance     Health Maintenance Due   Topic Date Due    IMM INFLUENZA (1) 09/01/2022     Recommend influenza vaccine.  Caregiver declined          Please note that this dictation was created using voice recognition software. I have made every reasonable attempt to correct obvious errors, but I expect that there are errors of grammar and possibly content that I did not discover before finalizing the note.    Meir Thomason MD  Internal Medicine  [facility]

## 2022-10-04 NOTE — ASSESSMENT & PLAN NOTE
Chronic condition.  The patient is presently taking levothyroxine.  Recent TSH is within the normal range.

## 2022-10-04 NOTE — ASSESSMENT & PLAN NOTE
Chronic condition patient is taking amlodipine and lisinopril.  Her blood pressure has been well controlled.  No significant side effects reported.

## 2022-10-04 NOTE — ASSESSMENT & PLAN NOTE
Chronic stable condition.  The patient followed by urology.  She takes Detrol LA.  No significant side effects reported.

## 2022-11-09 DIAGNOSIS — G30.1 LATE ONSET ALZHEIMER'S DEMENTIA WITH BEHAVIORAL DISTURBANCE (HCC): ICD-10-CM

## 2022-11-09 DIAGNOSIS — F02.818 LATE ONSET ALZHEIMER'S DEMENTIA WITH BEHAVIORAL DISTURBANCE (HCC): ICD-10-CM

## 2022-11-09 RX ORDER — QUETIAPINE FUMARATE 25 MG/1
TABLET, FILM COATED ORAL
Qty: 70 TABLET | Refills: 0 | Status: SHIPPED | OUTPATIENT
Start: 2022-11-09 | End: 2022-12-28 | Stop reason: SDUPTHER

## 2022-11-09 NOTE — TELEPHONE ENCOUNTER
Received request via: Pharmacy    Was the patient seen in the last year in this department? Yes    Does the patient have an active prescription (recently filled or refills available) for medication(s) requested? No    Called Patient to see what the patient is taking now. She is taking tablets every evening for 28 days.

## 2022-12-04 DIAGNOSIS — E03.9 ACQUIRED HYPOTHYROIDISM: ICD-10-CM

## 2022-12-05 RX ORDER — LEVOTHYROXINE SODIUM 0.03 MG/1
25 TABLET ORAL
Qty: 90 TABLET | Refills: 3 | Status: SHIPPED | OUTPATIENT
Start: 2022-12-05 | End: 2023-11-10 | Stop reason: SDUPTHER

## 2022-12-05 NOTE — TELEPHONE ENCOUNTER
Received request via: Pharmacy    Was the patient seen in the last year in this department? Yes    Does the patient have an active prescription (recently filled or refills available) for medication(s) requested? No    Does the patient have residential Plus and need 100 day supply (blood pressure, diabetes and cholesterol meds only)? Medication is not for cholesterol, blood pressure or diabetes

## 2022-12-14 ENCOUNTER — OFFICE VISIT (OUTPATIENT)
Dept: MEDICAL GROUP | Facility: PHYSICIAN GROUP | Age: 82
End: 2022-12-14
Payer: MEDICARE

## 2022-12-14 VITALS
TEMPERATURE: 97.4 F | HEIGHT: 64 IN | SYSTOLIC BLOOD PRESSURE: 136 MMHG | RESPIRATION RATE: 16 BRPM | DIASTOLIC BLOOD PRESSURE: 72 MMHG | OXYGEN SATURATION: 96 % | HEART RATE: 72 BPM | WEIGHT: 203 LBS | BODY MASS INDEX: 34.66 KG/M2

## 2022-12-14 DIAGNOSIS — E55.9 VITAMIN D DEFICIENCY: Chronic | ICD-10-CM

## 2022-12-14 DIAGNOSIS — R73.03 PREDIABETES: Chronic | ICD-10-CM

## 2022-12-14 DIAGNOSIS — L84 CALLUS: ICD-10-CM

## 2022-12-14 DIAGNOSIS — N18.31 STAGE 3A CHRONIC KIDNEY DISEASE: Chronic | ICD-10-CM

## 2022-12-14 DIAGNOSIS — E66.01 SEVERE OBESITY (HCC): ICD-10-CM

## 2022-12-14 DIAGNOSIS — E78.5 DYSLIPIDEMIA: Chronic | ICD-10-CM

## 2022-12-14 DIAGNOSIS — I10 PRIMARY HYPERTENSION: Chronic | ICD-10-CM

## 2022-12-14 DIAGNOSIS — G30.1 LATE ONSET ALZHEIMER'S DEMENTIA WITH BEHAVIORAL DISTURBANCE (HCC): ICD-10-CM

## 2022-12-14 DIAGNOSIS — E03.9 ACQUIRED HYPOTHYROIDISM: Chronic | ICD-10-CM

## 2022-12-14 DIAGNOSIS — M79.675 TOE PAIN, CHRONIC, LEFT: ICD-10-CM

## 2022-12-14 DIAGNOSIS — G89.29 TOE PAIN, CHRONIC, LEFT: ICD-10-CM

## 2022-12-14 DIAGNOSIS — F02.818 LATE ONSET ALZHEIMER'S DEMENTIA WITH BEHAVIORAL DISTURBANCE (HCC): ICD-10-CM

## 2022-12-14 PROCEDURE — 99214 OFFICE O/P EST MOD 30 MIN: CPT | Performed by: INTERNAL MEDICINE

## 2022-12-14 NOTE — ASSESSMENT & PLAN NOTE
Chronic condition.  Patient is being treated with atorvastatin.  Lipid panel ordered for follow-up.  Significant side effects reported.

## 2022-12-14 NOTE — PROGRESS NOTES
Annual Health Assessment Questions:    1.  Are you currently engaging in any exercise or physical activity? No    2.  How would you describe your mood or emotional well-being today? anxious    3.  Have you had any falls in the last year? No    4.  Have you noticed any problems with your balance or had difficulty walking? Yes    5.  In the last six months have you experienced any leakage of urine? No    6. DPA/Advanced Directive: Patient has Advance Directive on file.

## 2022-12-14 NOTE — ASSESSMENT & PLAN NOTE
This is a new condition   patient reported pain in the left fifth toe  Patient denies history of trauma or injury.  Pain is noted with wearing his shoes.  She denies fever or chills.

## 2022-12-14 NOTE — ASSESSMENT & PLAN NOTE
Chronic condition for the patient followed by neurology service  Patient presently taking Aricept.  Neurologist recently added Seroquel to her regimen.  No significant side effects reported.

## 2022-12-14 NOTE — ASSESSMENT & PLAN NOTE
This is a chronic condition.  The patient is taking levothyroxine.  Lab tests ordered for follow-up

## 2022-12-14 NOTE — PROGRESS NOTES
PRIMARY CARE CLINIC VISIT    Chief complaint:    Toe pain follow-up hypothyroidism, CKD cholesterol condition.  Follow-up hypertension and prediabetes  Med review        History of Present Illness     Severe obesity (HCC)  Body mass index is 34.84 kg/m².   Brief discussion with the patient regarding diet, exercise, and lifestyle modification to help achieve and maintain healthy weight              Late onset Alzheimer's dementia with behavioral disturbance (HCC)  Chronic condition for the patient followed by neurology service  Patient presently taking Aricept.  Neurologist recently added Seroquel to her regimen.  No significant side effects reported.    Acquired hypothyroidism  This is a chronic condition.  The patient is taking levothyroxine.  Lab tests ordered for follow-up    CKD (chronic kidney disease) stage 3, GFR 30-59 ml/min (HCC)  Chronic condition.  Previous GFR in the 50s.  Recommend patient to avoid NSAIDs.  Recommend lab test to be done for follow-up.    Dyslipidemia  Chronic condition.  Patient is being treated with atorvastatin.  Lipid panel ordered for follow-up.  Significant side effects reported.    HTN (hypertension)  Chronic stable condition.  The patient is taking lisinopril.  Her blood pressure has been well controlled at home.  Patient denies significant side effects.    Prediabetes  Chronic condition.  The patient diet therapy.  Lab tests ordered for follow-up.    Vitamin D deficiency  This is a chronic condition.  The patient currently not taking any vitamin patient currently taking vitamin D supplementation.  Lab tests ordered for follow-up.    Current Outpatient Medications on File Prior to Visit   Medication Sig Dispense Refill    levothyroxine (SYNTHROID) 25 MCG Tab TAKE 1 TABLET BY MOUTH EVERY MORNING ON AN EMPTY STOMACH 90 Tablet 3    QUEtiapine (SEROQUEL) 25 MG Tab TAKE 1 TABLET BY MOUTH EVERY EVENING FOR 14 DAYS THEN TAKE 2 TABLETS BY MOUTH EVERY EVENING FOR 28 DAYS 70 Tablet 0     "lisinopril (PRINIVIL) 30 MG tablet TAKE 1 TABLET BY MOUTH EVERY  Tablet 3    atorvastatin (LIPITOR) 20 MG Tab TAKE 1 TABLET BY MOUTH EVERY DAY AT BEDTIME 100 Tablet 3    amLODIPine (NORVASC) 5 MG Tab Take 1 Tablet by mouth every day. 90 Tablet 3    donepezil (ARICEPT) 10 MG tablet Take 1 Tablet by mouth every evening. 90 Tablet 3    ergocalciferol (DRISDOL) 05101 UNIT capsule Take 1 Capsule by mouth every 7 days. 15 Capsule 3    tolterodine ER (DETROL LA) 4 MG CAPSULE SR 24 HR Take 4 mg by mouth every day.      aspirin 81 MG tablet Take 81 mg by mouth every day.       No current facility-administered medications on file prior to visit.        Allergies: Patient has no known allergies.    ROS  As per HPI above. All other systems reviewed and negative.      Past Medical, Social, and Family history reviewed and updated in EPIC     Objective     /72 (BP Location: Left arm, Patient Position: Sitting, BP Cuff Size: Adult)   Pulse 72   Temp 36.3 °C (97.4 °F) (Temporal)   Resp 16   Ht 1.626 m (5' 4\")   Wt 92.1 kg (203 lb)   SpO2 96%    Body mass index is 34.84 kg/m².    General: alert in no apparent distress.  Cardiovascular: regular rate and rhythm  Pulmonary: lungs : no wheezing   Gastrointestinal: BS present. No obvious mass noted  Left foot there is a callus measuring approximately 5 x 6 mm in size left fifth toe.  There is no redness discharge or any signs of acute infection      Lab Results   Component Value Date/Time    HBA1C 6.0 (H) 04/20/2021 09:03 AM    HBA1C 6.2 (H) 01/29/2020 12:18 PM       Lab Results   Component Value Date/Time    WBC 7.6 09/27/2022 09:05 PM    HEMOGLOBIN 13.6 09/27/2022 09:05 PM    HEMATOCRIT 42.4 09/27/2022 09:05 PM    MCV 88.7 09/27/2022 09:05 PM    PLATELETCT 252 09/27/2022 09:05 PM         Lab Results   Component Value Date/Time    SODIUM 141 09/27/2022 09:05 PM    POTASSIUM 4.5 09/27/2022 09:05 PM    GLUCOSE 93 09/27/2022 09:05 PM    BUN 20 09/27/2022 09:05 PM    " CREATININE 0.76 09/27/2022 09:05 PM       Lab Results   Component Value Date/Time    ALKPHOSPHAT 74 01/29/2020 12:18 PM    ASTSGOT 19 01/29/2020 12:18 PM    ALTSGPT 18 04/20/2021 09:03 AM    TBILIRUBIN 0.5 01/29/2020 12:18 PM       Lab Results   Component Value Date/Time    CHOLSTRLTOT 189 04/20/2021 09:03 AM     (H) 04/20/2021 09:03 AM    HDL 49 04/20/2021 09:03 AM    TRIGLYCERIDE 94 04/20/2021 09:03 AM         Assessment and Plan     1. Toe pain, left  2. Callus  - Referral to Podiatry  But the patient to consider getting wider shoes callus pads which can be purchased over-the-counter  reCommend the use of the callus pads which can be purchased over-the-counter    3. Late onset Alzheimer's dementia with behavioral disturbance (HCC)  Stable condition.  Continue Aricept and Seroquel as recommended by neurologist.      4. Severe obesity (HCC)  Chronic stable condition.  Advised the patient regarding diet and exercise.  The patient will try to lose weight    5. Acquired hypothyroidism  - TSH; Future  Chronic stable condition.  Continue with levothyroxine.  Lab tests ordered.    6. Stage 3a chronic kidney disease (HCC)  - Basic Metabolic Panel; Future  Chronic stable condition.  Lab tests requested.  Recommend patient to avoid NSAIDs   continue to monitor.    7. Dyslipidemia  - Lipid Profile; Future  Chronic condition.  Continue with atorvastatin.  Lab tests ordered.  Recommend low-fat low-cholesterol diet      8. Primary hypertension  Chronic stable condition.  Continue with lisinopril.  BP is stable.  Continue to monitor her blood pressure regular basis at home.  Sodium restriction advised.    9. Vitamin D deficiency  - VITAMIN D,25 HYDROXY (DEFICIENCY); Future  Chronic condition.  Lab tests requested.  Continue with vitamin D supplementation once a week 50,000 units    10. Prediabetes  - HEMOGLOBIN A1C; Future  Chronic condition.  Recommend diet and exercise.  Continue to  monitor.                        Please note that this dictation was created using voice recognition software. I have made every reasonable attempt to correct obvious errors, but I expect that there are errors of grammar and possibly content that I did not discover before finalizing the note.    Meir Thomason MD  Internal Medicine  [facility]

## 2022-12-14 NOTE — ASSESSMENT & PLAN NOTE
Chronic stable condition.  The patient is taking lisinopril.  Her blood pressure has been well controlled at home.  Patient denies significant side effects.

## 2022-12-14 NOTE — ASSESSMENT & PLAN NOTE
This is a chronic condition.  The patient currently not taking any vitamin patient currently taking vitamin D supplementation.  Lab tests ordered for follow-up.

## 2022-12-14 NOTE — ASSESSMENT & PLAN NOTE
Chronic condition.  Previous GFR in the 50s.  Recommend patient to avoid NSAIDs.  Recommend lab test to be done for follow-up.

## 2022-12-14 NOTE — ASSESSMENT & PLAN NOTE
Body mass index is 34.84 kg/m².   Brief discussion with the patient regarding diet, exercise, and lifestyle modification to help achieve and maintain healthy weight

## 2022-12-28 ENCOUNTER — TELEPHONE (OUTPATIENT)
Dept: NEUROLOGY | Facility: MEDICAL CENTER | Age: 82
End: 2022-12-28
Payer: MEDICARE

## 2022-12-28 DIAGNOSIS — E03.9 ACQUIRED HYPOTHYROIDISM: ICD-10-CM

## 2022-12-28 DIAGNOSIS — G30.1 LATE ONSET ALZHEIMER'S DEMENTIA WITH BEHAVIORAL DISTURBANCE (HCC): ICD-10-CM

## 2022-12-28 DIAGNOSIS — F02.818 LATE ONSET ALZHEIMER'S DEMENTIA WITH BEHAVIORAL DISTURBANCE (HCC): ICD-10-CM

## 2022-12-28 RX ORDER — QUETIAPINE FUMARATE 25 MG/1
50 TABLET, FILM COATED ORAL EVERY EVENING
Qty: 180 TABLET | Refills: 1 | Status: SHIPPED | OUTPATIENT
Start: 2022-12-28 | End: 2023-01-12 | Stop reason: SDUPTHER

## 2022-12-28 NOTE — TELEPHONE ENCOUNTER
Talked to patient about her med's she is on  2 tabls at night .       Message from 12/27/2022  This is a titration schedule of the pharmacy is requesting.  Call the patient and find out what dose of quetiapine she is actually taking so I can renew appropriately.

## 2023-01-12 ENCOUNTER — OFFICE VISIT (OUTPATIENT)
Dept: NEUROLOGY | Facility: MEDICAL CENTER | Age: 83
End: 2023-01-12
Attending: PSYCHIATRY & NEUROLOGY
Payer: MEDICARE

## 2023-01-12 VITALS
BODY MASS INDEX: 33.99 KG/M2 | WEIGHT: 199.08 LBS | SYSTOLIC BLOOD PRESSURE: 142 MMHG | HEIGHT: 64 IN | TEMPERATURE: 97.1 F | HEART RATE: 91 BPM | DIASTOLIC BLOOD PRESSURE: 56 MMHG | OXYGEN SATURATION: 95 %

## 2023-01-12 DIAGNOSIS — F02.818 LATE ONSET ALZHEIMER'S DEMENTIA WITH BEHAVIORAL DISTURBANCE (HCC): ICD-10-CM

## 2023-01-12 DIAGNOSIS — G30.1 LATE ONSET ALZHEIMER'S DEMENTIA WITH BEHAVIORAL DISTURBANCE (HCC): ICD-10-CM

## 2023-01-12 PROCEDURE — 99215 OFFICE O/P EST HI 40 MIN: CPT | Performed by: PSYCHIATRY & NEUROLOGY

## 2023-01-12 PROCEDURE — 99212 OFFICE O/P EST SF 10 MIN: CPT | Performed by: PSYCHIATRY & NEUROLOGY

## 2023-01-12 RX ORDER — DONEPEZIL HYDROCHLORIDE 10 MG/1
10 TABLET, FILM COATED ORAL EVERY EVENING
Qty: 90 TABLET | Refills: 3 | Status: SHIPPED | OUTPATIENT
Start: 2023-01-12 | End: 2023-11-10 | Stop reason: SDUPTHER

## 2023-01-12 RX ORDER — QUETIAPINE FUMARATE 25 MG/1
TABLET, FILM COATED ORAL
Qty: 120 TABLET | Refills: 4 | Status: SHIPPED | OUTPATIENT
Start: 2023-01-12 | End: 2023-02-21 | Stop reason: SDUPTHER

## 2023-01-12 ASSESSMENT — PATIENT HEALTH QUESTIONNAIRE - PHQ9: CLINICAL INTERPRETATION OF PHQ2 SCORE: 0

## 2023-01-12 NOTE — PROGRESS NOTES
Subjective     Katharine Gee is a 82 y.o. female who presents with her daughter Yolande and her son-in-law, for follow-up, with a history of moderate Alzheimer's disease but his behavior has taken a notable downturn over the last couple of months.     HPI    Last seen in August, 2022, at that time her behavior actually was fairly well contained.  She did not require any kind of pharmacologic means of controlling it.  She was still to a degree independent with her ADLs, there have not been major problems with incontinence, personal hygiene, etc.  She slept well, was not pacing, etc.    There was a sudden change with behavior just a couple weeks later, more agitated, she was hallucinating, pacing at night, etc.  At my direction she was brought to the emergency room here.  I reviewed the records, CT of the brain revealed generalized atrophy only, nothing acute.  Urinalysis was remarkable for UTI though there was no pleocytosis and her CMP was normal.  Ammonia was normal.  She was placed on Augmentin and discharged.    Since then, the behavior has improved slightly but she is still having real difficulties.  She is hallucinating, is having conversations with family members over the phone, none of which is actually having.  She lives with both daughters, yet continues to confused to the ER.  She is no longer showering consistently, they are having to monitor her much more closely.  She does take her medicines were given to her.  Toileting habits are becoming more difficult and consistently problematic.    She is also pacing incessantly.  She is not sleeping consistently though Seroquel 50 mg added to her regimen, this is helped.  The drug has also helped with the daytime behavioral problems but only to a degree.    Herself, Katharine states that she has some memory problems but nothing is unusual.  She should to recall who I am, but states that she knew me when she was in her 20s!    She is on Seroquel 50 mg  "every evening, Aricept 10 mg every evening, Synthroid, Norvasc, Prinivil, Lipitor, and Detrol LA 4 mg.    Review of Systems   Unable to perform ROS: Dementia     Objective     BP (!) 142/56 (BP Location: Right arm, Patient Position: Sitting, BP Cuff Size: Adult)   Pulse 91   Temp 36.2 °C (97.1 °F) (Temporal)   Ht 1.626 m (5' 4\")   Wt 90.3 kg (199 lb 1.2 oz)   SpO2 95%   BMI 34.17 kg/m²      Physical Exam    She appears in no acute distress.  She is clean and appropriately dressed.  She is cooperative.  Vital signs are stable.  There is no malar rash.  Her neck is supple.  Cardiac evaluation is unremarkable.     Neurological Exam    She knows she is in the doctor's office, but has no clue as to the portions of the date, even the name of the hospital itself.  She does not know our city location.  She names and repeats, there is no apraxia though there is hand-object substitution.  She has difficulty remembering her daughter's name, confusing Yolande with her sister.  She has actually no clue as to the nature of the difficulties she is having right now.  Within less than 5 minutes of asking her for the date in our location, she still looks at her daughter for answers, having no ability to remember what she was just told.    PERRLA/EOMI, visual fields are grossly full, facial movements are symmetric and there is no bulbar dysfunction.    Musculoskeletal exam reveals her to move all 4 extremities symmetrically.  There is no obvious hemiparesis.    There is no appendicular dystaxia, she stands easily, gait is normal and station and stride length.  Fine motor control of the hands is intact.    Sensory exam is deferred.    Assessment & Plan     1. Late onset Alzheimer's dementia with behavioral disturbance (HCC)  The behavioral changes certainly not unheard of in patients with this disease, hers did seem to occur a bit more acutely.  Urinary tract infection did not help matters but that has now been treated.  Seroquel " will be increased, adding 25 mg to the morning with 50 mg in the evening for 1 week and then 50 mg twice daily.  Side effects were reviewed.  We may be able to alter the dosing schedule if needed moving forward.    Unfortunately, some of the behaviors including psychosis can be easily treated if present, real problems with pacing and motor behavioral changes which are more problematic.  This is not always triggered by anxiety, the patients themselves certainly have no insight.  If we can at least regulate day and night sleep cycling, remove the hallucinations and delusional thoughts, we might be able to control some of the agitation indirectly.    Part of the reason to bring  Yolande's  along his to have him truly understand the severity of his mother-in-law's condition.  He has become keenly aware of this.  I also recommended that they try to obtain some type of respite care for Katharine, if available to them. Yolande certainly understands the need, she states he has been tried but has had no luck so far.  I will follow-up with her in 6 months.  We will communicate via [employer] bards.    - QUEtiapine (SEROQUEL) 25 MG Tab; 1 tab qAM and 2 tab PM for 1 week, then 2 tab bid  Dispense: 120 Tablet; Refill: 4  - donepezil (ARICEPT) 10 MG tablet; Take 1 Tablet by mouth every evening.  Dispense: 90 Tablet; Refill: 3    Time: 40 minutes in total spent on patient care including per charting, record review, discussion with healthcare staff and documentation.  This includes face-to-face time for exam, review, discussion, as well as counseling and coordinating care.

## 2023-03-07 ENCOUNTER — TELEPHONE (OUTPATIENT)
Dept: NEUROLOGY | Facility: MEDICAL CENTER | Age: 83
End: 2023-03-07
Payer: MEDICARE

## 2023-03-07 DIAGNOSIS — G30.1 LATE ONSET ALZHEIMER'S DEMENTIA WITH BEHAVIORAL DISTURBANCE (HCC): ICD-10-CM

## 2023-03-07 DIAGNOSIS — F02.818 LATE ONSET ALZHEIMER'S DEMENTIA WITH BEHAVIORAL DISTURBANCE (HCC): ICD-10-CM

## 2023-03-07 RX ORDER — QUETIAPINE FUMARATE 25 MG/1
50 TABLET, FILM COATED ORAL 2 TIMES DAILY
Qty: 120 TABLET | Refills: 4 | Status: SHIPPED | OUTPATIENT
Start: 2023-03-07 | End: 2023-07-06

## 2023-03-07 RX ORDER — QUETIAPINE FUMARATE 25 MG/1
50 TABLET, FILM COATED ORAL 2 TIMES DAILY
Qty: 120 TABLET | Refills: 4 | Status: CANCELLED | OUTPATIENT
Start: 2023-03-07

## 2023-03-07 NOTE — TELEPHONE ENCOUNTER
Received request via: Pharmacy    Was the patient seen in the last year in this department? Yes    Does the patient have an active prescription (recently filled or refills available) for medication(s) requested? Yes.     Does the patient have shelter Plus and need 100 day supply (blood pressure, diabetes and cholesterol meds only)? No

## 2023-05-24 ENCOUNTER — DOCUMENTATION (OUTPATIENT)
Dept: HEALTH INFORMATION MANAGEMENT | Facility: OTHER | Age: 83
End: 2023-05-24
Payer: MEDICARE

## 2023-05-24 ENCOUNTER — PATIENT MESSAGE (OUTPATIENT)
Dept: HEALTH INFORMATION MANAGEMENT | Facility: OTHER | Age: 83
End: 2023-05-24

## 2023-06-21 DIAGNOSIS — I10 ESSENTIAL HYPERTENSION: ICD-10-CM

## 2023-06-21 RX ORDER — AMLODIPINE BESYLATE 5 MG/1
5 TABLET ORAL
Qty: 100 TABLET | Refills: 2 | Status: SHIPPED | OUTPATIENT
Start: 2023-06-21 | End: 2023-11-10 | Stop reason: SDUPTHER

## 2023-06-30 ENCOUNTER — TELEPHONE (OUTPATIENT)
Dept: NEUROLOGY | Facility: MEDICAL CENTER | Age: 83
End: 2023-06-30
Payer: MEDICARE

## 2023-06-30 NOTE — TELEPHONE ENCOUNTER
NEUROLOGY PATIENT PRE-VISIT PLANNING     Patient was NOT contacted to complete PVP.    Patient Appointment is scheduled as: Established Patient     Is visit type and length scheduled correctly? Yes    [facility] is checked in Patient Demographics? Yes    3.   Is referral attached to visit? Yes    4. Were records received from referring provider? Yes    4. Patient was NOT contacted to have someone accompany them to visit.     5. Is this appointment scheduled as a Hospital Follow-Up?  No    6. Does the patient require any pre procedure or post procedure follow up? No    7. If any orders were placed at last visit or intended to be done for this visit do we have Results/Consult Notes? Yes  Labs - Labs were not ordered at last office visit.  Imaging - Imaging was not ordered at last office visit.  Referrals - No referrals were ordered at last office visit.    8. If patient appointment is for Botox - is order pended for provider? N/A

## 2023-07-06 ENCOUNTER — OFFICE VISIT (OUTPATIENT)
Dept: NEUROLOGY | Facility: MEDICAL CENTER | Age: 83
End: 2023-07-06
Attending: PSYCHIATRY & NEUROLOGY
Payer: MEDICARE

## 2023-07-06 VITALS
HEIGHT: 64 IN | OXYGEN SATURATION: 92 % | SYSTOLIC BLOOD PRESSURE: 120 MMHG | TEMPERATURE: 97 F | WEIGHT: 208.78 LBS | BODY MASS INDEX: 35.64 KG/M2 | HEART RATE: 83 BPM | DIASTOLIC BLOOD PRESSURE: 58 MMHG

## 2023-07-06 DIAGNOSIS — F02.818 LATE ONSET ALZHEIMER'S DEMENTIA WITH BEHAVIORAL DISTURBANCE (HCC): ICD-10-CM

## 2023-07-06 DIAGNOSIS — G30.1 LATE ONSET ALZHEIMER'S DEMENTIA WITH BEHAVIORAL DISTURBANCE (HCC): ICD-10-CM

## 2023-07-06 PROCEDURE — 3078F DIAST BP <80 MM HG: CPT | Performed by: PSYCHIATRY & NEUROLOGY

## 2023-07-06 PROCEDURE — 99215 OFFICE O/P EST HI 40 MIN: CPT | Performed by: PSYCHIATRY & NEUROLOGY

## 2023-07-06 PROCEDURE — 3074F SYST BP LT 130 MM HG: CPT | Performed by: PSYCHIATRY & NEUROLOGY

## 2023-07-06 PROCEDURE — 99212 OFFICE O/P EST SF 10 MIN: CPT | Performed by: PSYCHIATRY & NEUROLOGY

## 2023-07-06 RX ORDER — LANOLIN ALCOHOL/MO/W.PET/CERES
3 CREAM (GRAM) TOPICAL
Qty: 30 TABLET | Refills: 0 | COMMUNITY
Start: 2023-07-06 | End: 2024-03-01

## 2023-07-06 RX ORDER — QUETIAPINE FUMARATE 50 MG/1
50 TABLET, FILM COATED ORAL 2 TIMES DAILY
Qty: 180 TABLET | Refills: 3 | Status: SHIPPED | OUTPATIENT
Start: 2023-07-06 | End: 2023-11-10 | Stop reason: SDUPTHER

## 2023-07-06 NOTE — PROGRESS NOTES
Subjective     Katharine Gee is a 82 y.o. female who presents with both her daughters, Gerri and Yolande, for 6-month follow-up, with a history of moderate Alzheimer's dementia.  History is still the most part derived from review of the records as well as discussions with the patient's daughters.    HPI    Since last seen, Katharine feels that she is doing well.  Infections not sure why she is in my office at all.  When directly asked, she seems to that she has some kind of illness but she is not sure what it is or its effect on her life.    Her behavior has come under improved control.  We increased the Seroquel from a 50 mg nightly dosing regimen to twice daily, the hallucinations and overall anxiety have improved.  For her pacing, they added melatonin 3 mg taken every evening, this has helped significantly.  She now for the most part sleeps the night through, if she gets up, it is going to the bathroom and nothing more.    She still requires assistance when taking her medicines, the need to watch to make sure she takes them.  Though she does perform her ADLs, she requires some cueing and reminders such as when to take a shower.  She recognizes she has a calendar that she needs to check off every time she does bathe, the problems that she is forgetting to do so and check off after bathing.  She walks in steady fashion without falls though she looks unsteady.  She has been given a cane but she tends to leave it behind her when she gets up and moves around, goes out, etc.    She eats regularly, in fact she fixes her own breakfast every morning.  She can feed herself without issue.  There is urinary incontinence only in the mornings when she gets up.  They have given her depends diapers to wear, she simply refuses to use them.  She is confusing family members by name though she is comfortable when she sees them.  Even the 2 dogs that they have are often confused by name.    The house is safe, they can  "leave her for an hour, unattended, if both her daughters have to go out and shop.  For the most part she does need to be monitored.    Medical, surgical and family histories are reviewed, there are no new drug allergies.  She is on melatonin 3 mg every evening, donepezil 10 mg every evening, Seroquel 50 mg, twice daily, Detrol LA, baby aspirin, Lipitor, Synthroid, Norvasc, Prinivil, and Lipitor.    Review of Systems   Unable to perform ROS: Dementia     Objective     /58 (BP Location: Right arm, Patient Position: Sitting, BP Cuff Size: Adult)   Pulse 83   Temp 36.1 °C (97 °F) (Temporal)   Ht 1.626 m (5' 4\")   Wt 94.7 kg (208 lb 12.4 oz)   SpO2 92%   BMI 35.84 kg/m²      Physical Exam    She appears in no acute distress.  She is clean and appropriately dressed.  She is quite cooperative.  She easily engages with the examiner when questioned, though she still looks at her daughters for answers when questions are asked of her.  Vital signs are stable.  There is no malar rash or sialorrhea.  Her neck is supple.  Cardiac evaluation is unremarkable.     Neurological Exam    She knows that she is at a hospital, if given enough time she knows the hospital located in [place].  Still she mistakenly states that they live in [place], in fact they moved to [place].  She has no concept of the date itself.  When told by name and why she is seeing me, she forgets both within a matter of minutes.  She does name and repeat, there is no apraxia.  She names both her daughters appropriately.    PERRLA/EOMI, visual fields are full, facial movements are symmetric, there is no sensory loss to temperature.  Frontal release signs are absent.  There is no bulbar dysfunction, the tongue and uvula are midline.  Shoulder shrug and head rotation are normal.    Musculoskeletal exam reveals normal tone without tremor or drift.  Strength is intact throughout.  There are no pathologic reflexes.    She stands slowly but does so independently, " there is a slight apractic quality as she walks.  Tandem walking was not assessed.  Station is slightly increased.  There is no appendicular dystaxia.  Fine motor control is intact with the hands and fingers.    Sensory exam is intact to vibration.    Assessment & Plan     1. Late onset Alzheimer's dementia with behavioral disturbance (HCC)  Despite the responsibilities and encumbrances that are part of living with the patient suffering from dementia, Gerri and Julia are doing incredibly well.  The house is still stable and safe for the patient to be left alone for short interval of time.  She is compliant with her medicines, they have found an appropriate drug cocktail to control of a years she has been suffering from.  Hopefully they will not need to be increased.  She is eating regularly.  Sleep hygiene has been improved.  We can simply see each other in 6 months.    - QUEtiapine (SEROQUEL) 50 MG tablet; Take 1 Tablet by mouth 2 times a day.  Dispense: 180 Tablet; Refill: 3  - melatonin 3 MG Tab; Take 1 Tablet by mouth at bedtime.  Dispense: 30 Tablet; Refill: 0     Time: 40 minutes in total spent on patient care including pre-charting, record review, discussion with healthcare staff and documentation.  This includes face-to-face time for exam, review, discussion, as well as counseling and coordinating care.

## 2023-07-22 DIAGNOSIS — G30.1 LATE ONSET ALZHEIMER'S DEMENTIA WITH BEHAVIORAL DISTURBANCE (HCC): ICD-10-CM

## 2023-07-22 DIAGNOSIS — F02.818 LATE ONSET ALZHEIMER'S DEMENTIA WITH BEHAVIORAL DISTURBANCE (HCC): ICD-10-CM

## 2023-07-24 RX ORDER — QUETIAPINE FUMARATE 25 MG/1
50 TABLET, FILM COATED ORAL 2 TIMES DAILY
Qty: 120 TABLET | Refills: 4 | Status: SHIPPED | OUTPATIENT
Start: 2023-07-24 | End: 2023-10-09

## 2023-10-09 ENCOUNTER — APPOINTMENT (OUTPATIENT)
Dept: RADIOLOGY | Facility: MEDICAL CENTER | Age: 83
DRG: 871 | End: 2023-10-09
Attending: EMERGENCY MEDICINE
Payer: MEDICARE

## 2023-10-09 ENCOUNTER — HOSPITAL ENCOUNTER (INPATIENT)
Facility: MEDICAL CENTER | Age: 83
LOS: 5 days | DRG: 871 | End: 2023-10-14
Attending: EMERGENCY MEDICINE | Admitting: STUDENT IN AN ORGANIZED HEALTH CARE EDUCATION/TRAINING PROGRAM
Payer: MEDICARE

## 2023-10-09 DIAGNOSIS — J96.01 SEPSIS WITH ACUTE HYPOXIC RESPIRATORY FAILURE WITHOUT SEPTIC SHOCK, DUE TO UNSPECIFIED ORGANISM (HCC): ICD-10-CM

## 2023-10-09 DIAGNOSIS — R65.20 SEPSIS WITH ACUTE HYPOXIC RESPIRATORY FAILURE WITHOUT SEPTIC SHOCK, DUE TO UNSPECIFIED ORGANISM (HCC): ICD-10-CM

## 2023-10-09 DIAGNOSIS — J69.0 ASPIRATION PNEUMONIA OF RIGHT LUNG, UNSPECIFIED ASPIRATION PNEUMONIA TYPE, UNSPECIFIED PART OF LUNG (HCC): ICD-10-CM

## 2023-10-09 DIAGNOSIS — J18.9 PNEUMONIA OF RIGHT LOWER LOBE DUE TO INFECTIOUS ORGANISM: ICD-10-CM

## 2023-10-09 DIAGNOSIS — J96.01 ACUTE HYPOXEMIC RESPIRATORY FAILURE (HCC): ICD-10-CM

## 2023-10-09 DIAGNOSIS — R73.9 HYPERGLYCEMIA: ICD-10-CM

## 2023-10-09 DIAGNOSIS — A41.9 SEPSIS WITH ACUTE HYPOXIC RESPIRATORY FAILURE WITHOUT SEPTIC SHOCK, DUE TO UNSPECIFIED ORGANISM (HCC): ICD-10-CM

## 2023-10-09 LAB
ALBUMIN SERPL BCP-MCNC: 3.3 G/DL (ref 3.2–4.9)
ALBUMIN/GLOB SERPL: 0.8 G/DL
ALP SERPL-CCNC: 105 U/L (ref 30–99)
ALT SERPL-CCNC: 15 U/L (ref 2–50)
ANION GAP SERPL CALC-SCNC: 14 MMOL/L (ref 7–16)
APPEARANCE UR: CLEAR
AST SERPL-CCNC: 21 U/L (ref 12–45)
BACTERIA #/AREA URNS HPF: NEGATIVE /HPF
BASOPHILS # BLD AUTO: 0.4 % (ref 0–1.8)
BASOPHILS # BLD: 0.06 K/UL (ref 0–0.12)
BILIRUB SERPL-MCNC: 0.4 MG/DL (ref 0.1–1.5)
BILIRUB UR QL STRIP.AUTO: NEGATIVE
BUN SERPL-MCNC: 37 MG/DL (ref 8–22)
CALCIUM ALBUM COR SERPL-MCNC: 10.4 MG/DL (ref 8.5–10.5)
CALCIUM SERPL-MCNC: 9.8 MG/DL (ref 8.5–10.5)
CHLORIDE SERPL-SCNC: 106 MMOL/L (ref 96–112)
CO2 SERPL-SCNC: 23 MMOL/L (ref 20–33)
COLOR UR: YELLOW
CREAT SERPL-MCNC: 1.4 MG/DL (ref 0.5–1.4)
CRP SERPL HS-MCNC: 33.15 MG/DL (ref 0–0.75)
EKG IMPRESSION: NORMAL
EOSINOPHIL # BLD AUTO: 0.02 K/UL (ref 0–0.51)
EOSINOPHIL NFR BLD: 0.1 % (ref 0–6.9)
EPI CELLS #/AREA URNS HPF: NEGATIVE /HPF
ERYTHROCYTE [DISTWIDTH] IN BLOOD BY AUTOMATED COUNT: 51.2 FL (ref 35.9–50)
ERYTHROCYTE [SEDIMENTATION RATE] IN BLOOD BY WESTERGREN METHOD: 73 MM/HOUR (ref 0–25)
FLUAV RNA SPEC QL NAA+PROBE: NEGATIVE
FLUBV RNA SPEC QL NAA+PROBE: NEGATIVE
GFR SERPLBLD CREATININE-BSD FMLA CKD-EPI: 37 ML/MIN/1.73 M 2
GLOBULIN SER CALC-MCNC: 4.2 G/DL (ref 1.9–3.5)
GLUCOSE SERPL-MCNC: 145 MG/DL (ref 65–99)
GLUCOSE UR STRIP.AUTO-MCNC: NEGATIVE MG/DL
HCT VFR BLD AUTO: 41.2 % (ref 37–47)
HGB BLD-MCNC: 13.2 G/DL (ref 12–16)
HYALINE CASTS #/AREA URNS LPF: ABNORMAL /LPF
IMM GRANULOCYTES # BLD AUTO: 0.08 K/UL (ref 0–0.11)
IMM GRANULOCYTES NFR BLD AUTO: 0.5 % (ref 0–0.9)
KETONES UR STRIP.AUTO-MCNC: ABNORMAL MG/DL
LACTATE SERPL-SCNC: 2.1 MMOL/L (ref 0.5–2)
LEUKOCYTE ESTERASE UR QL STRIP.AUTO: ABNORMAL
LYMPHOCYTES # BLD AUTO: 0.88 K/UL (ref 1–4.8)
LYMPHOCYTES NFR BLD: 5.3 % (ref 22–41)
MCH RBC QN AUTO: 28 PG (ref 27–33)
MCHC RBC AUTO-ENTMCNC: 32 G/DL (ref 32.2–35.5)
MCV RBC AUTO: 87.5 FL (ref 81.4–97.8)
MICRO URNS: ABNORMAL
MONOCYTES # BLD AUTO: 0.82 K/UL (ref 0–0.85)
MONOCYTES NFR BLD AUTO: 5 % (ref 0–13.4)
NEUTROPHILS # BLD AUTO: 14.67 K/UL (ref 1.82–7.42)
NEUTROPHILS NFR BLD: 88.7 % (ref 44–72)
NITRITE UR QL STRIP.AUTO: NEGATIVE
NRBC # BLD AUTO: 0 K/UL
NRBC BLD-RTO: 0 /100 WBC (ref 0–0.2)
PH UR STRIP.AUTO: 5 [PH] (ref 5–8)
PLATELET # BLD AUTO: 225 K/UL (ref 164–446)
PMV BLD AUTO: 9.8 FL (ref 9–12.9)
POTASSIUM SERPL-SCNC: 3.8 MMOL/L (ref 3.6–5.5)
PROCALCITONIN SERPL-MCNC: 0.53 NG/ML
PROT SERPL-MCNC: 7.5 G/DL (ref 6–8.2)
PROT UR QL STRIP: 30 MG/DL
RBC # BLD AUTO: 4.71 M/UL (ref 4.2–5.4)
RBC # URNS HPF: ABNORMAL /HPF
RBC UR QL AUTO: ABNORMAL
RSV RNA SPEC QL NAA+PROBE: NEGATIVE
SARS-COV-2 RNA RESP QL NAA+PROBE: NOTDETECTED
SODIUM SERPL-SCNC: 143 MMOL/L (ref 135–145)
SP GR UR STRIP.AUTO: 1.03
SPECIMEN SOURCE: NORMAL
UROBILINOGEN UR STRIP.AUTO-MCNC: 1 MG/DL
WBC # BLD AUTO: 16.5 K/UL (ref 4.8–10.8)
WBC #/AREA URNS HPF: ABNORMAL /HPF

## 2023-10-09 PROCEDURE — 93005 ELECTROCARDIOGRAM TRACING: CPT

## 2023-10-09 PROCEDURE — 0241U HCHG SARS-COV-2 COVID-19 NFCT DS RESP RNA 4 TRGT MIC: CPT

## 2023-10-09 PROCEDURE — 770020 HCHG ROOM/CARE - TELE (206)

## 2023-10-09 PROCEDURE — 94640 AIRWAY INHALATION TREATMENT: CPT

## 2023-10-09 PROCEDURE — 85025 COMPLETE CBC W/AUTO DIFF WBC: CPT

## 2023-10-09 PROCEDURE — 700102 HCHG RX REV CODE 250 W/ 637 OVERRIDE(OP): Performed by: EMERGENCY MEDICINE

## 2023-10-09 PROCEDURE — 84145 PROCALCITONIN (PCT): CPT

## 2023-10-09 PROCEDURE — 700102 HCHG RX REV CODE 250 W/ 637 OVERRIDE(OP): Performed by: STUDENT IN AN ORGANIZED HEALTH CARE EDUCATION/TRAINING PROGRAM

## 2023-10-09 PROCEDURE — 99285 EMERGENCY DEPT VISIT HI MDM: CPT

## 2023-10-09 PROCEDURE — 99497 ADVNCD CARE PLAN 30 MIN: CPT | Mod: 25 | Performed by: STUDENT IN AN ORGANIZED HEALTH CARE EDUCATION/TRAINING PROGRAM

## 2023-10-09 PROCEDURE — 700105 HCHG RX REV CODE 258: Performed by: EMERGENCY MEDICINE

## 2023-10-09 PROCEDURE — A9270 NON-COVERED ITEM OR SERVICE: HCPCS | Performed by: EMERGENCY MEDICINE

## 2023-10-09 PROCEDURE — 700111 HCHG RX REV CODE 636 W/ 250 OVERRIDE (IP): Performed by: STUDENT IN AN ORGANIZED HEALTH CARE EDUCATION/TRAINING PROGRAM

## 2023-10-09 PROCEDURE — 87040 BLOOD CULTURE FOR BACTERIA: CPT

## 2023-10-09 PROCEDURE — 86140 C-REACTIVE PROTEIN: CPT

## 2023-10-09 PROCEDURE — 96365 THER/PROPH/DIAG IV INF INIT: CPT

## 2023-10-09 PROCEDURE — A9270 NON-COVERED ITEM OR SERVICE: HCPCS | Performed by: STUDENT IN AN ORGANIZED HEALTH CARE EDUCATION/TRAINING PROGRAM

## 2023-10-09 PROCEDURE — 81001 URINALYSIS AUTO W/SCOPE: CPT

## 2023-10-09 PROCEDURE — 700111 HCHG RX REV CODE 636 W/ 250 OVERRIDE (IP): Mod: JZ | Performed by: EMERGENCY MEDICINE

## 2023-10-09 PROCEDURE — 71045 X-RAY EXAM CHEST 1 VIEW: CPT

## 2023-10-09 PROCEDURE — 80053 COMPREHEN METABOLIC PANEL: CPT

## 2023-10-09 PROCEDURE — 99223 1ST HOSP IP/OBS HIGH 75: CPT | Mod: 25,AI | Performed by: STUDENT IN AN ORGANIZED HEALTH CARE EDUCATION/TRAINING PROGRAM

## 2023-10-09 PROCEDURE — 83605 ASSAY OF LACTIC ACID: CPT

## 2023-10-09 PROCEDURE — 93005 ELECTROCARDIOGRAM TRACING: CPT | Performed by: EMERGENCY MEDICINE

## 2023-10-09 PROCEDURE — 700101 HCHG RX REV CODE 250: Performed by: STUDENT IN AN ORGANIZED HEALTH CARE EDUCATION/TRAINING PROGRAM

## 2023-10-09 PROCEDURE — 36415 COLL VENOUS BLD VENIPUNCTURE: CPT

## 2023-10-09 PROCEDURE — 85652 RBC SED RATE AUTOMATED: CPT

## 2023-10-09 PROCEDURE — C9803 HOPD COVID-19 SPEC COLLECT: HCPCS | Performed by: EMERGENCY MEDICINE

## 2023-10-09 PROCEDURE — 87086 URINE CULTURE/COLONY COUNT: CPT

## 2023-10-09 RX ORDER — SODIUM CHLORIDE, SODIUM LACTATE, POTASSIUM CHLORIDE, AND CALCIUM CHLORIDE .6; .31; .03; .02 G/100ML; G/100ML; G/100ML; G/100ML
500 INJECTION, SOLUTION INTRAVENOUS
Status: DISCONTINUED | OUTPATIENT
Start: 2023-10-09 | End: 2023-10-14 | Stop reason: HOSPADM

## 2023-10-09 RX ORDER — QUETIAPINE FUMARATE 25 MG/1
50 TABLET, FILM COATED ORAL 2 TIMES DAILY
Status: DISCONTINUED | OUTPATIENT
Start: 2023-10-09 | End: 2023-10-14 | Stop reason: HOSPADM

## 2023-10-09 RX ORDER — BISACODYL 10 MG
10 SUPPOSITORY, RECTAL RECTAL
Status: DISCONTINUED | OUTPATIENT
Start: 2023-10-09 | End: 2023-10-14 | Stop reason: HOSPADM

## 2023-10-09 RX ORDER — AMOXICILLIN 250 MG
2 CAPSULE ORAL 2 TIMES DAILY
Status: DISCONTINUED | OUTPATIENT
Start: 2023-10-10 | End: 2023-10-14 | Stop reason: HOSPADM

## 2023-10-09 RX ORDER — IPRATROPIUM BROMIDE AND ALBUTEROL SULFATE 2.5; .5 MG/3ML; MG/3ML
3 SOLUTION RESPIRATORY (INHALATION)
Status: DISCONTINUED | OUTPATIENT
Start: 2023-10-09 | End: 2023-10-10

## 2023-10-09 RX ORDER — LEVOTHYROXINE SODIUM 0.03 MG/1
25 TABLET ORAL
Status: DISCONTINUED | OUTPATIENT
Start: 2023-10-10 | End: 2023-10-14 | Stop reason: HOSPADM

## 2023-10-09 RX ORDER — HEPARIN SODIUM 5000 [USP'U]/ML
5000 INJECTION, SOLUTION INTRAVENOUS; SUBCUTANEOUS EVERY 8 HOURS
Status: DISCONTINUED | OUTPATIENT
Start: 2023-10-09 | End: 2023-10-14 | Stop reason: HOSPADM

## 2023-10-09 RX ORDER — AZITHROMYCIN 250 MG/1
500 TABLET, FILM COATED ORAL ONCE
Status: COMPLETED | OUTPATIENT
Start: 2023-10-09 | End: 2023-10-09

## 2023-10-09 RX ORDER — UREA 10 %
3 LOTION (ML) TOPICAL
Status: DISCONTINUED | OUTPATIENT
Start: 2023-10-09 | End: 2023-10-10

## 2023-10-09 RX ORDER — INSULIN LISPRO 100 [IU]/ML
3-14 INJECTION, SOLUTION INTRAVENOUS; SUBCUTANEOUS EVERY 6 HOURS
Status: DISCONTINUED | OUTPATIENT
Start: 2023-10-10 | End: 2023-10-10

## 2023-10-09 RX ORDER — POLYETHYLENE GLYCOL 3350 17 G/17G
1 POWDER, FOR SOLUTION ORAL
Status: DISCONTINUED | OUTPATIENT
Start: 2023-10-09 | End: 2023-10-14 | Stop reason: HOSPADM

## 2023-10-09 RX ORDER — SODIUM CHLORIDE, SODIUM LACTATE, POTASSIUM CHLORIDE, AND CALCIUM CHLORIDE .6; .31; .03; .02 G/100ML; G/100ML; G/100ML; G/100ML
30 INJECTION, SOLUTION INTRAVENOUS ONCE
Status: COMPLETED | OUTPATIENT
Start: 2023-10-09 | End: 2023-10-09

## 2023-10-09 RX ORDER — ONDANSETRON 4 MG/1
4 TABLET, ORALLY DISINTEGRATING ORAL EVERY 4 HOURS PRN
Status: DISCONTINUED | OUTPATIENT
Start: 2023-10-09 | End: 2023-10-14 | Stop reason: HOSPADM

## 2023-10-09 RX ORDER — LABETALOL HYDROCHLORIDE 5 MG/ML
10 INJECTION, SOLUTION INTRAVENOUS EVERY 4 HOURS PRN
Status: DISCONTINUED | OUTPATIENT
Start: 2023-10-09 | End: 2023-10-14 | Stop reason: HOSPADM

## 2023-10-09 RX ORDER — ACETAMINOPHEN 325 MG/1
650 TABLET ORAL EVERY 6 HOURS PRN
Status: DISCONTINUED | OUTPATIENT
Start: 2023-10-09 | End: 2023-10-14 | Stop reason: HOSPADM

## 2023-10-09 RX ORDER — IPRATROPIUM BROMIDE AND ALBUTEROL SULFATE 2.5; .5 MG/3ML; MG/3ML
3 SOLUTION RESPIRATORY (INHALATION)
Status: DISCONTINUED | OUTPATIENT
Start: 2023-10-09 | End: 2023-10-14 | Stop reason: HOSPADM

## 2023-10-09 RX ORDER — DONEPEZIL HYDROCHLORIDE 5 MG/1
10 TABLET, FILM COATED ORAL EVERY EVENING
Status: DISCONTINUED | OUTPATIENT
Start: 2023-10-09 | End: 2023-10-14 | Stop reason: HOSPADM

## 2023-10-09 RX ORDER — DEXTROSE MONOHYDRATE 25 G/50ML
25 INJECTION, SOLUTION INTRAVENOUS
Status: DISCONTINUED | OUTPATIENT
Start: 2023-10-09 | End: 2023-10-10

## 2023-10-09 RX ORDER — ATORVASTATIN CALCIUM 20 MG/1
20 TABLET, FILM COATED ORAL
Status: DISCONTINUED | OUTPATIENT
Start: 2023-10-09 | End: 2023-10-14 | Stop reason: HOSPADM

## 2023-10-09 RX ORDER — ONDANSETRON 2 MG/ML
4 INJECTION INTRAMUSCULAR; INTRAVENOUS EVERY 4 HOURS PRN
Status: DISCONTINUED | OUTPATIENT
Start: 2023-10-09 | End: 2023-10-14 | Stop reason: HOSPADM

## 2023-10-09 RX ORDER — AMLODIPINE BESYLATE 5 MG/1
5 TABLET ORAL DAILY
Status: DISCONTINUED | OUTPATIENT
Start: 2023-10-10 | End: 2023-10-14 | Stop reason: HOSPADM

## 2023-10-09 RX ORDER — HALOPERIDOL 5 MG/ML
1 INJECTION INTRAMUSCULAR EVERY 4 HOURS PRN
Status: DISCONTINUED | OUTPATIENT
Start: 2023-10-09 | End: 2023-10-14 | Stop reason: HOSPADM

## 2023-10-09 RX ORDER — ASPIRIN 81 MG/1
81 TABLET ORAL DAILY
Status: DISCONTINUED | OUTPATIENT
Start: 2023-10-10 | End: 2023-10-14 | Stop reason: HOSPADM

## 2023-10-09 RX ADMIN — ATORVASTATIN CALCIUM 20 MG: 20 TABLET, FILM COATED ORAL at 22:02

## 2023-10-09 RX ADMIN — IPRATROPIUM BROMIDE AND ALBUTEROL SULFATE 3 ML: 2.5; .5 SOLUTION RESPIRATORY (INHALATION) at 21:47

## 2023-10-09 RX ADMIN — DONEPEZIL HYDROCHLORIDE 10 MG: 5 TABLET, FILM COATED ORAL at 22:02

## 2023-10-09 RX ADMIN — HEPARIN SODIUM 5000 UNITS: 5000 INJECTION, SOLUTION INTRAVENOUS; SUBCUTANEOUS at 22:02

## 2023-10-09 RX ADMIN — QUETIAPINE FUMARATE 50 MG: 25 TABLET ORAL at 22:02

## 2023-10-09 RX ADMIN — Medication 3 MG: at 23:22

## 2023-10-09 RX ADMIN — AZITHROMYCIN DIHYDRATE 500 MG: 250 TABLET, FILM COATED ORAL at 19:55

## 2023-10-09 RX ADMIN — AMPICILLIN AND SULBACTAM 3 G: 1; 2 INJECTION, POWDER, FOR SOLUTION INTRAMUSCULAR; INTRAVENOUS at 19:55

## 2023-10-09 RX ADMIN — SODIUM CHLORIDE, POTASSIUM CHLORIDE, SODIUM LACTATE AND CALCIUM CHLORIDE 1641 ML: 600; 310; 30; 20 INJECTION, SOLUTION INTRAVENOUS at 19:55

## 2023-10-09 ASSESSMENT — LIFESTYLE VARIABLES
DO YOU DRINK ALCOHOL: NO
EVER FELT BAD OR GUILTY ABOUT YOUR DRINKING: NO
HAVE YOU EVER FELT YOU SHOULD CUT DOWN ON YOUR DRINKING: NO
TOTAL SCORE: 0
CONSUMPTION TOTAL: INCOMPLETE
EVER HAD A DRINK FIRST THING IN THE MORNING TO STEADY YOUR NERVES TO GET RID OF A HANGOVER: NO
HAVE PEOPLE ANNOYED YOU BY CRITICIZING YOUR DRINKING: NO

## 2023-10-09 NOTE — ED TRIAGE NOTES
".  Chief Complaint   Patient presents with    Cold Symptoms     Since Thursday, cough, fever, sob, hypoxia on RA.     Shortness of Breath       83 yo wheeled to triage for above complaint. Sepsis score 4. Protocol ordered. COVID swabbed in triage.      Pt is alert and confused at baseline per daughters.      Patient placed back for labs and educated on triage process. Asked to inform RN of any changes or concerns.     BP (!) 150/130   Pulse (!) 113   Temp 36.1 °C (96.9 °F) (Temporal)   Resp 18   Ht 1.626 m (5' 4\")   Wt 95.3 kg (210 lb)   SpO2 92%   BMI 36.05 kg/m²     "

## 2023-10-10 ENCOUNTER — APPOINTMENT (OUTPATIENT)
Dept: RADIOLOGY | Facility: MEDICAL CENTER | Age: 83
DRG: 871 | End: 2023-10-10
Attending: INTERNAL MEDICINE
Payer: MEDICARE

## 2023-10-10 ENCOUNTER — APPOINTMENT (OUTPATIENT)
Dept: RADIOLOGY | Facility: MEDICAL CENTER | Age: 83
DRG: 871 | End: 2023-10-10
Payer: MEDICARE

## 2023-10-10 ENCOUNTER — APPOINTMENT (OUTPATIENT)
Dept: CARDIOLOGY | Facility: MEDICAL CENTER | Age: 83
DRG: 871 | End: 2023-10-10
Attending: STUDENT IN AN ORGANIZED HEALTH CARE EDUCATION/TRAINING PROGRAM
Payer: MEDICARE

## 2023-10-10 PROBLEM — E66.9 CLASS 2 OBESITY IN ADULT: Status: ACTIVE | Noted: 2020-01-08

## 2023-10-10 PROBLEM — Z71.89 ACP (ADVANCE CARE PLANNING): Status: ACTIVE | Noted: 2019-11-01

## 2023-10-10 PROBLEM — J69.0 ASPIRATION PNEUMONIA (HCC): Status: ACTIVE | Noted: 2023-10-10

## 2023-10-10 PROBLEM — A41.9 SEPSIS (HCC): Status: ACTIVE | Noted: 2023-10-10

## 2023-10-10 LAB
ALBUMIN SERPL BCP-MCNC: 2.8 G/DL (ref 3.2–4.9)
ALBUMIN/GLOB SERPL: 0.8 G/DL
ALP SERPL-CCNC: 86 U/L (ref 30–99)
ALT SERPL-CCNC: 12 U/L (ref 2–50)
ANION GAP SERPL CALC-SCNC: 10 MMOL/L (ref 7–16)
AST SERPL-CCNC: 21 U/L (ref 12–45)
BASOPHILS # BLD AUTO: 0.3 % (ref 0–1.8)
BASOPHILS # BLD: 0.04 K/UL (ref 0–0.12)
BILIRUB SERPL-MCNC: 0.4 MG/DL (ref 0.1–1.5)
BUN SERPL-MCNC: 36 MG/DL (ref 8–22)
CALCIUM ALBUM COR SERPL-MCNC: 10.1 MG/DL (ref 8.5–10.5)
CALCIUM SERPL-MCNC: 9.1 MG/DL (ref 8.5–10.5)
CHLORIDE SERPL-SCNC: 105 MMOL/L (ref 96–112)
CO2 SERPL-SCNC: 25 MMOL/L (ref 20–33)
CORTIS SERPL-MCNC: 14.5 UG/DL (ref 0–23)
CREAT SERPL-MCNC: 1.17 MG/DL (ref 0.5–1.4)
D DIMER PPP IA.FEU-MCNC: 6.87 UG/ML (FEU) (ref 0–0.5)
EOSINOPHIL # BLD AUTO: 0 K/UL (ref 0–0.51)
EOSINOPHIL NFR BLD: 0 % (ref 0–6.9)
ERYTHROCYTE [DISTWIDTH] IN BLOOD BY AUTOMATED COUNT: 52.2 FL (ref 35.9–50)
EST. AVERAGE GLUCOSE BLD GHB EST-MCNC: 134 MG/DL
GFR SERPLBLD CREATININE-BSD FMLA CKD-EPI: 46 ML/MIN/1.73 M 2
GLOBULIN SER CALC-MCNC: 3.6 G/DL (ref 1.9–3.5)
GLUCOSE BLD STRIP.AUTO-MCNC: 108 MG/DL (ref 65–99)
GLUCOSE BLD STRIP.AUTO-MCNC: 116 MG/DL (ref 65–99)
GLUCOSE BLD STRIP.AUTO-MCNC: 127 MG/DL (ref 65–99)
GLUCOSE BLD STRIP.AUTO-MCNC: 135 MG/DL (ref 65–99)
GLUCOSE BLD STRIP.AUTO-MCNC: 93 MG/DL (ref 65–99)
GLUCOSE SERPL-MCNC: 118 MG/DL (ref 65–99)
HBA1C MFR BLD: 6.3 % (ref 4–5.6)
HCT VFR BLD AUTO: 35.5 % (ref 37–47)
HGB BLD-MCNC: 11.4 G/DL (ref 12–16)
IMM GRANULOCYTES # BLD AUTO: 0.09 K/UL (ref 0–0.11)
IMM GRANULOCYTES NFR BLD AUTO: 0.6 % (ref 0–0.9)
INR PPP: 1.3 (ref 0.87–1.13)
LACTATE SERPL-SCNC: 0.7 MMOL/L (ref 0.5–2)
LYMPHOCYTES # BLD AUTO: 1.98 K/UL (ref 1–4.8)
LYMPHOCYTES NFR BLD: 12.7 % (ref 22–41)
MAGNESIUM SERPL-MCNC: 2.1 MG/DL (ref 1.5–2.5)
MCH RBC QN AUTO: 28.6 PG (ref 27–33)
MCHC RBC AUTO-ENTMCNC: 32.1 G/DL (ref 32.2–35.5)
MCV RBC AUTO: 89 FL (ref 81.4–97.8)
MONOCYTES # BLD AUTO: 1.2 K/UL (ref 0–0.85)
MONOCYTES NFR BLD AUTO: 7.7 % (ref 0–13.4)
NEUTROPHILS # BLD AUTO: 12.32 K/UL (ref 1.82–7.42)
NEUTROPHILS NFR BLD: 78.7 % (ref 44–72)
NRBC # BLD AUTO: 0 K/UL
NRBC BLD-RTO: 0 /100 WBC (ref 0–0.2)
PHOSPHATE SERPL-MCNC: 3 MG/DL (ref 2.5–4.5)
PLATELET # BLD AUTO: 201 K/UL (ref 164–446)
PMV BLD AUTO: 10 FL (ref 9–12.9)
POTASSIUM SERPL-SCNC: 3.6 MMOL/L (ref 3.6–5.5)
PROT SERPL-MCNC: 6.4 G/DL (ref 6–8.2)
PROTHROMBIN TIME: 16.4 SEC (ref 12–14.6)
RBC # BLD AUTO: 3.99 M/UL (ref 4.2–5.4)
SODIUM SERPL-SCNC: 140 MMOL/L (ref 135–145)
WBC # BLD AUTO: 15.6 K/UL (ref 4.8–10.8)

## 2023-10-10 PROCEDURE — 85379 FIBRIN DEGRADATION QUANT: CPT

## 2023-10-10 PROCEDURE — 85610 PROTHROMBIN TIME: CPT

## 2023-10-10 PROCEDURE — 94640 AIRWAY INHALATION TREATMENT: CPT

## 2023-10-10 PROCEDURE — 92611 MOTION FLUOROSCOPY/SWALLOW: CPT

## 2023-10-10 PROCEDURE — 71275 CT ANGIOGRAPHY CHEST: CPT

## 2023-10-10 PROCEDURE — 94760 N-INVAS EAR/PLS OXIMETRY 1: CPT

## 2023-10-10 PROCEDURE — 85025 COMPLETE CBC W/AUTO DIFF WBC: CPT

## 2023-10-10 PROCEDURE — 700111 HCHG RX REV CODE 636 W/ 250 OVERRIDE (IP): Mod: JZ | Performed by: STUDENT IN AN ORGANIZED HEALTH CARE EDUCATION/TRAINING PROGRAM

## 2023-10-10 PROCEDURE — 700105 HCHG RX REV CODE 258: Performed by: STUDENT IN AN ORGANIZED HEALTH CARE EDUCATION/TRAINING PROGRAM

## 2023-10-10 PROCEDURE — 74230 X-RAY XM SWLNG FUNCJ C+: CPT

## 2023-10-10 PROCEDURE — 82962 GLUCOSE BLOOD TEST: CPT

## 2023-10-10 PROCEDURE — 770006 HCHG ROOM/CARE - MED/SURG/GYN SEMI*

## 2023-10-10 PROCEDURE — 80053 COMPREHEN METABOLIC PANEL: CPT

## 2023-10-10 PROCEDURE — 82533 TOTAL CORTISOL: CPT

## 2023-10-10 PROCEDURE — A9270 NON-COVERED ITEM OR SERVICE: HCPCS | Performed by: STUDENT IN AN ORGANIZED HEALTH CARE EDUCATION/TRAINING PROGRAM

## 2023-10-10 PROCEDURE — 83735 ASSAY OF MAGNESIUM: CPT

## 2023-10-10 PROCEDURE — 99497 ADVNCD CARE PLAN 30 MIN: CPT | Performed by: INTERNAL MEDICINE

## 2023-10-10 PROCEDURE — 700102 HCHG RX REV CODE 250 W/ 637 OVERRIDE(OP): Performed by: STUDENT IN AN ORGANIZED HEALTH CARE EDUCATION/TRAINING PROGRAM

## 2023-10-10 PROCEDURE — 92610 EVALUATE SWALLOWING FUNCTION: CPT

## 2023-10-10 PROCEDURE — 83036 HEMOGLOBIN GLYCOSYLATED A1C: CPT

## 2023-10-10 PROCEDURE — 700117 HCHG RX CONTRAST REV CODE 255

## 2023-10-10 PROCEDURE — 700102 HCHG RX REV CODE 250 W/ 637 OVERRIDE(OP): Performed by: INTERNAL MEDICINE

## 2023-10-10 PROCEDURE — 99233 SBSQ HOSP IP/OBS HIGH 50: CPT | Mod: 25 | Performed by: INTERNAL MEDICINE

## 2023-10-10 PROCEDURE — A9270 NON-COVERED ITEM OR SERVICE: HCPCS | Performed by: INTERNAL MEDICINE

## 2023-10-10 PROCEDURE — 36415 COLL VENOUS BLD VENIPUNCTURE: CPT

## 2023-10-10 PROCEDURE — 700101 HCHG RX REV CODE 250: Performed by: INTERNAL MEDICINE

## 2023-10-10 PROCEDURE — 84100 ASSAY OF PHOSPHORUS: CPT

## 2023-10-10 PROCEDURE — 83605 ASSAY OF LACTIC ACID: CPT

## 2023-10-10 RX ORDER — IPRATROPIUM BROMIDE AND ALBUTEROL SULFATE 2.5; .5 MG/3ML; MG/3ML
3 SOLUTION RESPIRATORY (INHALATION)
Status: DISCONTINUED | OUTPATIENT
Start: 2023-10-10 | End: 2023-10-11

## 2023-10-10 RX ORDER — CHOLECALCIFEROL (VITAMIN D3) 125 MCG
5 CAPSULE ORAL NIGHTLY
Status: DISCONTINUED | OUTPATIENT
Start: 2023-10-10 | End: 2023-10-14 | Stop reason: HOSPADM

## 2023-10-10 RX ORDER — AZITHROMYCIN 250 MG/1
500 TABLET, FILM COATED ORAL DAILY
Status: COMPLETED | OUTPATIENT
Start: 2023-10-10 | End: 2023-10-11

## 2023-10-10 RX ORDER — FUROSEMIDE 10 MG/ML
20 INJECTION INTRAMUSCULAR; INTRAVENOUS
Status: DISCONTINUED | OUTPATIENT
Start: 2023-10-10 | End: 2023-10-10

## 2023-10-10 RX ORDER — SODIUM CHLORIDE 9 MG/ML
INJECTION, SOLUTION INTRAVENOUS CONTINUOUS
Status: DISCONTINUED | OUTPATIENT
Start: 2023-10-10 | End: 2023-10-11

## 2023-10-10 RX ORDER — DEXTROSE MONOHYDRATE 25 G/50ML
25 INJECTION, SOLUTION INTRAVENOUS
Status: DISCONTINUED | OUTPATIENT
Start: 2023-10-10 | End: 2023-10-14 | Stop reason: HOSPADM

## 2023-10-10 RX ORDER — OMEPRAZOLE 20 MG/1
20 CAPSULE, DELAYED RELEASE ORAL 2 TIMES DAILY
Status: DISCONTINUED | OUTPATIENT
Start: 2023-10-10 | End: 2023-10-14 | Stop reason: HOSPADM

## 2023-10-10 RX ADMIN — HEPARIN SODIUM 5000 UNITS: 5000 INJECTION, SOLUTION INTRAVENOUS; SUBCUTANEOUS at 21:31

## 2023-10-10 RX ADMIN — DONEPEZIL HYDROCHLORIDE 10 MG: 5 TABLET, FILM COATED ORAL at 17:23

## 2023-10-10 RX ADMIN — OMEPRAZOLE 20 MG: 20 CAPSULE, DELAYED RELEASE ORAL at 06:07

## 2023-10-10 RX ADMIN — AZITHROMYCIN 500 MG: 250 TABLET, FILM COATED ORAL at 17:23

## 2023-10-10 RX ADMIN — IOHEXOL 40 ML: 350 INJECTION, SOLUTION INTRAVENOUS at 05:27

## 2023-10-10 RX ADMIN — OMEPRAZOLE 20 MG: 20 CAPSULE, DELAYED RELEASE ORAL at 17:23

## 2023-10-10 RX ADMIN — Medication 5 MG: at 21:31

## 2023-10-10 RX ADMIN — DOCUSATE SODIUM 50 MG AND SENNOSIDES 8.6 MG 2 TABLET: 8.6; 5 TABLET, FILM COATED ORAL at 06:07

## 2023-10-10 RX ADMIN — AMPICILLIN AND SULBACTAM 3 G: 1; 2 INJECTION, POWDER, FOR SOLUTION INTRAMUSCULAR; INTRAVENOUS at 14:02

## 2023-10-10 RX ADMIN — ATORVASTATIN CALCIUM 20 MG: 20 TABLET, FILM COATED ORAL at 21:31

## 2023-10-10 RX ADMIN — ASPIRIN 81 MG: 81 TABLET, COATED ORAL at 06:07

## 2023-10-10 RX ADMIN — IPRATROPIUM BROMIDE AND ALBUTEROL SULFATE 3 ML: 2.5; .5 SOLUTION RESPIRATORY (INHALATION) at 11:48

## 2023-10-10 RX ADMIN — QUETIAPINE FUMARATE 50 MG: 25 TABLET ORAL at 06:08

## 2023-10-10 RX ADMIN — HEPARIN SODIUM 5000 UNITS: 5000 INJECTION, SOLUTION INTRAVENOUS; SUBCUTANEOUS at 06:07

## 2023-10-10 RX ADMIN — AMPICILLIN AND SULBACTAM 3 G: 1; 2 INJECTION, POWDER, FOR SOLUTION INTRAMUSCULAR; INTRAVENOUS at 07:49

## 2023-10-10 RX ADMIN — LEVOTHYROXINE SODIUM 25 MCG: 0.03 TABLET ORAL at 06:07

## 2023-10-10 RX ADMIN — DOCUSATE SODIUM 50 MG AND SENNOSIDES 8.6 MG 2 TABLET: 8.6; 5 TABLET, FILM COATED ORAL at 17:23

## 2023-10-10 RX ADMIN — AMPICILLIN AND SULBACTAM 3 G: 1; 2 INJECTION, POWDER, FOR SOLUTION INTRAMUSCULAR; INTRAVENOUS at 21:31

## 2023-10-10 RX ADMIN — THIAMINE HYDROCHLORIDE 100 MG: 100 INJECTION, SOLUTION INTRAMUSCULAR; INTRAVENOUS at 05:59

## 2023-10-10 RX ADMIN — IPRATROPIUM BROMIDE AND ALBUTEROL SULFATE 3 ML: 2.5; .5 SOLUTION RESPIRATORY (INHALATION) at 23:33

## 2023-10-10 RX ADMIN — HEPARIN SODIUM 5000 UNITS: 5000 INJECTION, SOLUTION INTRAVENOUS; SUBCUTANEOUS at 14:03

## 2023-10-10 RX ADMIN — SODIUM CHLORIDE: 9 INJECTION, SOLUTION INTRAVENOUS at 01:45

## 2023-10-10 RX ADMIN — QUETIAPINE FUMARATE 50 MG: 25 TABLET ORAL at 17:23

## 2023-10-10 RX ADMIN — AMPICILLIN AND SULBACTAM 3 G: 1; 2 INJECTION, POWDER, FOR SOLUTION INTRAMUSCULAR; INTRAVENOUS at 01:46

## 2023-10-10 ASSESSMENT — LIFESTYLE VARIABLES
DOES PATIENT WANT TO STOP DRINKING: NO
EVER FELT BAD OR GUILTY ABOUT YOUR DRINKING: NO
TOTAL SCORE: 0
CONSUMPTION TOTAL: NEGATIVE
ON A TYPICAL DAY WHEN YOU DRINK ALCOHOL HOW MANY DRINKS DO YOU HAVE: 0
SUBSTANCE_ABUSE: 0
HOW MANY TIMES IN THE PAST YEAR HAVE YOU HAD 5 OR MORE DRINKS IN A DAY: 0
HAVE PEOPLE ANNOYED YOU BY CRITICIZING YOUR DRINKING: NO
TOTAL SCORE: 0
EVER HAD A DRINK FIRST THING IN THE MORNING TO STEADY YOUR NERVES TO GET RID OF A HANGOVER: NO
ALCOHOL_USE: NO
HAVE YOU EVER FELT YOU SHOULD CUT DOWN ON YOUR DRINKING: NO
TOTAL SCORE: 0
AVERAGE NUMBER OF DAYS PER WEEK YOU HAVE A DRINK CONTAINING ALCOHOL: 0

## 2023-10-10 ASSESSMENT — COGNITIVE AND FUNCTIONAL STATUS - GENERAL
SUGGESTED CMS G CODE MODIFIER MOBILITY: CL
STANDING UP FROM CHAIR USING ARMS: A LOT
TURNING FROM BACK TO SIDE WHILE IN FLAT BAD: A LOT
CLIMB 3 TO 5 STEPS WITH RAILING: A LOT
DRESSING REGULAR LOWER BODY CLOTHING: A LOT
MOVING FROM LYING ON BACK TO SITTING ON SIDE OF FLAT BED: A LOT
MOVING TO AND FROM BED TO CHAIR: A LOT
PERSONAL GROOMING: A LITTLE
DRESSING REGULAR UPPER BODY CLOTHING: A LOT
HELP NEEDED FOR BATHING: A LOT
TOILETING: A LOT
WALKING IN HOSPITAL ROOM: A LOT
SUGGESTED CMS G CODE MODIFIER DAILY ACTIVITY: CK
DAILY ACTIVITIY SCORE: 15
MOBILITY SCORE: 12

## 2023-10-10 ASSESSMENT — PATIENT HEALTH QUESTIONNAIRE - PHQ9
1. LITTLE INTEREST OR PLEASURE IN DOING THINGS: NOT AT ALL
2. FEELING DOWN, DEPRESSED, IRRITABLE, OR HOPELESS: NOT AT ALL
SUM OF ALL RESPONSES TO PHQ9 QUESTIONS 1 AND 2: 0

## 2023-10-10 ASSESSMENT — ENCOUNTER SYMPTOMS
DOUBLE VISION: 0
SPEECH CHANGE: 0
WEIGHT LOSS: 0
ORTHOPNEA: 0
CHILLS: 1
HEMOPTYSIS: 0
COUGH: 0
FEVER: 0
PHOTOPHOBIA: 0
PALPITATIONS: 0
CONSTIPATION: 0
DIZZINESS: 0
MYALGIAS: 0
BRUISES/BLEEDS EASILY: 0
FEVER: 1
HEARTBURN: 1
BLURRED VISION: 0
HEADACHES: 0
WEAKNESS: 0
SPUTUM PRODUCTION: 1
COUGH: 1
CLAUDICATION: 0
DIARRHEA: 0
WHEEZING: 1
NAUSEA: 0
DEPRESSION: 0
NAUSEA: 1
CHILLS: 0
SHORTNESS OF BREATH: 1
NECK PAIN: 0
WEAKNESS: 1
VOMITING: 0
ABDOMINAL PAIN: 0

## 2023-10-10 ASSESSMENT — FIBROSIS 4 INDEX
FIB4 SCORE: 2.53

## 2023-10-10 ASSESSMENT — PAIN DESCRIPTION - PAIN TYPE: TYPE: ACUTE PAIN

## 2023-10-10 NOTE — ASSESSMENT & PLAN NOTE
Came with leukocytosis and infiltrates on the chest x-ray  Patient is on high risk for aspiration due to advanced dementia  Speech on board, appreciate the recommendations  Antibiotic: Unasyn, azithromycin  Follow cultures  Discussed the risk of recurrent aspiration pneumonia with family.  Speech evaluation

## 2023-10-10 NOTE — THERAPY
Speech Language Therapy Contact Note    Patient Name: Katharine Gee  Age:  82 y.o., Sex:  female  Medical Record #: [id number]  Today's Date: 10/10/2023    Attempted to perform bedside FEES. Unable to pass scope through L & R nares; pt w/ increasing restlessness/pain w/ attempts. Discontinued attempts d/t same. Will change order to MBS and schedule as able. Discussed w/ RN.

## 2023-10-10 NOTE — THERAPY
Speech Language Pathology   Videofluoroscopic Swallow Study Evaluation     Patient Name: Katharine Gee  AGE:  82 y.o., SEX:  female  Medical Record #: [id number]  Date of Service: 10/10/2023      History of Present Illness  81 y/o F admit 10/9 w/ URI symptoms including hypoxia. Admit CXR with right sided pneumonia. Pt requiring 4L O2. No records of ST service/dysphagia found in EMR     PMHx: dementia, HTN, hypothyroidism    Pertinent Information  Current Method of Nutrition: NPO  Patient Behaviors: Confused  Dentition: Natural dentition  Secretion Management: Adequate  Feeding Tube: None      Factor(s) Affecting Performance: None    Discussed the risks, benefits, and alternatives of the VFSS procedure. Patient/family acknowledged and agreed to proceed.    Assessment  Videofluoroscopic Swallow Study was conducted in the Lateral projection(s) to evaluate oropharyngeal swallow function. A radiology tech was present to assist with the procedure.  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; it did not impede bolus flow across any consistencies  Bolus Administration: SLP, Patient  PO Barium Contrast Trials: Varibar thin, Varibar pudding, Soft & Bite sized coated with Varibar powder, Regular solid coated with Varibar pudding    Consistency PAS Score Timing Residue Comments   Thin Liquid 2 During swallow, N/A Vallecular Residue: None (0%)  Pyriform Sinus Residue: None (0%) Max PAS 2, Modal PAS 1  Tsp, cup and consecutive straw sips trialed   Pudding 1 N/A Vallecular Residue: None (0%)  Pyriform Sinus Residue: None (0%)    Soft & Bite Sized 1 N/A Vallecular Residue: None (0%)  Pyriform Sinus Residue: None (0%)    Regular Solid 1 N/A Vallecular Residue: None (0%)  Pyriform Sinus Residue: None (0%)      Penetration-Aspiration Scale (PAS)  1     No contrast enters airway  2     Contrast enters the  airway, remains above the vocal folds, and is ejected from the airway (not seen in the airway at the end of the swallow).  3     Contrast enters the airway, remains above the vocal folds, and is not ejected from the airway (is seen in the airway after the swallow).  4     Contrast enters the airway, contacts the vocal folds, and is ejected from the airway.  5     Contrast enters the airway, contacts the vocal folds, and is not ejected from the airway  6     Contrast enters the airway, crosses the plane of the vocal folds, and is ejected from the airway.  7     Contrast enters the airway, crosses the plane of the vocal folds, and is not ejected from the airway despite effort.  8     Contrast enters the airway, crosses the plane of the vocal folds, is not ejected from the airway and there is no response to aspiration.     Oral phase: Functional across all textures, no oral residue post swallow    Pharyngeal phase: Timely swallow initiation w/ adequate airway protection. There was intermittent flash penetration, which would be considered a WNL finding. Pharyngeal constriction was good and no gross pharyngeal residue was seen after the swallow.    Of note, pt demo'd congested coughing before, during and after PO trials during the study- this did not correlate with any airway invasion.     Compensatory Strategies: N/A    Severity Rating:   Severity Rating: SISSY     SISSY: Normal    Clinical Impressions  The pt p/w oropharyngeal swallow function WNL. She appears appropriate for a regular diet with thin/all liquids. Silent aspiration has been ruled out. No further ST services are indicated at this time.     Recommendations  Diet Consistency: Thin/all Liquids, Regular Solids  Medication: As tolerated  Supervision: Assist with meal tray set up  Positioning: Fully upright and midline during oral intake, Meals sitting upright in a chair, as tolerated  Strategies: None  Oral Care: BID  Additional Instrumentation: None     SLP  Treatment Plan  Treatment Plan: None Indicated  SLP Frequency: N/A - Evaluation Only  Estimated Duration: N/A - Evaluation Only    Anticipated Discharge Needs  Discharge Recommendations: Anticipate that the patient will have no further speech therapy needs after discharge from the hospital        Patient / Family Goals  Patient / Family Goal #1: I am thirsty  Goal #1 Outcome: Goal met  Short Term Goal # 1: Pt will participate in instrumentation to define swallow physiology and determine ST POC  Goal Outcome # 1: Goal met  Short Term Goal # 2: Pt will tolerate the safest, least restrictive diet textures with no signs of aspiration related pulmonary complications  Goal Outcome # 2 : Goal met    Bina Taylor, SLP

## 2023-10-10 NOTE — ED PROVIDER NOTES
ED Provider Note    CHIEF COMPLAINT  Chief Complaint   Patient presents with    Cold Symptoms     Since Thursday, cough, fever, sob, hypoxia on RA.     Shortness of Breath       EXTERNAL RECORDS REVIEWED  Outpatient Notes multiple outpatient notes for routine medical screening, medication refills as well as neurologic follow-up for Alzheimer's evaluation    HPI/ROS  LIMITATION TO HISTORY   Select: : None  OUTSIDE HISTORIAN(S):  Family at bedside providing majority of history    Katharine Gee is a 82 y.o. female who presents to the emergency department with increased cough, shortness of breath and fever.  No known sick contacts.  Currently is at home with family members.  Due to worsening symptoms over the last couple of days ultimately brought in by family for further care and work-up.  Does not take home oxygen.  No chest pain.  No abdominal pain.  No back pain.    Home oxygen saturations reportedly in the 80s    PAST MEDICAL HISTORY   has a past medical history of Acquired hypothyroidism (7/18/2017), Arthritis, Carotid artery stenosis (10/19/2020), HTN (hypertension), Hypertension, Kidney stone, Memory loss (10/4/2017), Menopause (2/2/2015), Migraine, Obesity (BMI 30-39.9) (4/5/2018), and Polymyalgia rheumatica (HCC).    SURGICAL HISTORY   has a past surgical history that includes revise secondary varicosity; exploratory laparotomy (7/3/2014); bowel resection (7/3/2014); tonsillectomy; and vaginal hysterectomy total.    FAMILY HISTORY  Family History   Problem Relation Age of Onset    Heart Disease Mother     Heart Attack Father     Diabetes Brother     Dementia Brother     Diabetes Maternal Grandmother        SOCIAL HISTORY  Social History     Tobacco Use    Smoking status: Never    Smokeless tobacco: Never   Vaping Use    Vaping Use: Never used   Substance and Sexual Activity    Alcohol use: Yes     Comment: very seldomly drinks    Drug use: No    Sexual activity: Not Currently       CURRENT  "MEDICATIONS  Home Medications       Reviewed by Vitaliy Sadler (Pharmacy Tech) on 10/09/23 at 2022  Med List Status: Complete     Medication Last Dose Status   amLODIPine (NORVASC) 5 MG Tab 10/8/2023 Active   aspirin 81 MG tablet 10/8/2023 Active   atorvastatin (LIPITOR) 20 MG Tab 10/8/2023 Active   donepezil (ARICEPT) 10 MG tablet 10/8/2023 Active   levothyroxine (SYNTHROID) 25 MCG Tab 10/8/2023 Active   lisinopril (PRINIVIL) 30 MG tablet 10/8/2023 Active   melatonin 3 MG Tab 10/8/2023 Active   QUEtiapine (SEROQUEL) 50 MG tablet 10/8/2023 Active   tolterodine ER (DETROL LA) 4 MG CAPSULE SR 24 HR 10/8/2023 Active                    ALLERGIES  No Known Allergies    PHYSICAL EXAM  VITAL SIGNS: /52   Pulse 82   Temp 36.1 °C (97 °F) (Temporal)   Resp 16   Ht 1.626 m (5' 4\")   Wt 94.2 kg (207 lb 10.8 oz)   SpO2 97%   BMI 35.65 kg/m²      Pulse ox interpretation: I interpret this pulse ox as normal.  Constitutional: Alert in no apparent distress.  Oxygen mask in place  HENT: No signs of trauma, Bilateral external ears normal, Nose normal.   Eyes: Pupils are equal and reactive  Neck: Normal range of motion, No tenderness, Supple  Cardiovascular: Regular rate and rhythm, no murmurs.   Thorax & Lungs: Diminished breath sounds and tachypneic  Abdomen: Bowel sounds normal, Soft, No tenderness, No masses, No pulsatile masses. No peritoneal signs.  Skin: Warm, Dry, No erythema, No rash.   Extremities: Intact distal pulses, trace edema, No tenderness  Musculoskeletal: Good range of motion in all major joints. No tenderness to palpation or major deformities noted.   Neurologic: Alert , Normal motor function, Normal sensory function, No focal deficits noted.   Psychiatric: Affect normal, Judgment normal, Mood normal.         DIAGNOSTIC STUDIES / PROCEDURES    LABS  Results for orders placed or performed during the hospital encounter of 10/09/23   Lactic acid (lactate)   Result Value Ref Range    Lactic Acid 2.1 " (H) 0.5 - 2.0 mmol/L   CBC With Differential   Result Value Ref Range    WBC 16.5 (H) 4.8 - 10.8 K/uL    RBC 4.71 4.20 - 5.40 M/uL    Hemoglobin 13.2 12.0 - 16.0 g/dL    Hematocrit 41.2 37.0 - 47.0 %    MCV 87.5 81.4 - 97.8 fL    MCH 28.0 27.0 - 33.0 pg    MCHC 32.0 (L) 32.2 - 35.5 g/dL    RDW 51.2 (H) 35.9 - 50.0 fL    Platelet Count 225 164 - 446 K/uL    MPV 9.8 9.0 - 12.9 fL    Neutrophils-Polys 88.70 (H) 44.00 - 72.00 %    Lymphocytes 5.30 (L) 22.00 - 41.00 %    Monocytes 5.00 0.00 - 13.40 %    Eosinophils 0.10 0.00 - 6.90 %    Basophils 0.40 0.00 - 1.80 %    Immature Granulocytes 0.50 0.00 - 0.90 %    Nucleated RBC 0.00 0.00 - 0.20 /100 WBC    Neutrophils (Absolute) 14.67 (H) 1.82 - 7.42 K/uL    Lymphs (Absolute) 0.88 (L) 1.00 - 4.80 K/uL    Monos (Absolute) 0.82 0.00 - 0.85 K/uL    Eos (Absolute) 0.02 0.00 - 0.51 K/uL    Baso (Absolute) 0.06 0.00 - 0.12 K/uL    Immature Granulocytes (abs) 0.08 0.00 - 0.11 K/uL    NRBC (Absolute) 0.00 K/uL   Comp Metabolic Panel   Result Value Ref Range    Sodium 143 135 - 145 mmol/L    Potassium 3.8 3.6 - 5.5 mmol/L    Chloride 106 96 - 112 mmol/L    Co2 23 20 - 33 mmol/L    Anion Gap 14.0 7.0 - 16.0    Glucose 145 (H) 65 - 99 mg/dL    Bun 37 (H) 8 - 22 mg/dL    Creatinine 1.40 0.50 - 1.40 mg/dL    Calcium 9.8 8.5 - 10.5 mg/dL    Correct Calcium 10.4 8.5 - 10.5 mg/dL    AST(SGOT) 21 12 - 45 U/L    ALT(SGPT) 15 2 - 50 U/L    Alkaline Phosphatase 105 (H) 30 - 99 U/L    Total Bilirubin 0.4 0.1 - 1.5 mg/dL    Albumin 3.3 3.2 - 4.9 g/dL    Total Protein 7.5 6.0 - 8.2 g/dL    Globulin 4.2 (H) 1.9 - 3.5 g/dL    A-G Ratio 0.8 g/dL   Urinalysis    Specimen: Urine   Result Value Ref Range    Color Yellow     Character Clear     Specific Gravity 1.028 <1.035    Ph 5.0 5.0 - 8.0    Glucose Negative Negative mg/dL    Ketones Trace (A) Negative mg/dL    Protein 30 (A) Negative mg/dL    Bilirubin Negative Negative    Urobilinogen, Urine 1.0 Negative    Nitrite Negative Negative    Leukocyte  Esterase Trace (A) Negative    Occult Blood Trace (A) Negative    Micro Urine Req Microscopic    Urine Culture (New)    Specimen: Urine   Result Value Ref Range    Significant Indicator NEG     Source UR     Site -     Culture Result No growth at 24 hours.    Blood Culture - Draw one set from central line if present    Specimen: Peripheral; Blood   Result Value Ref Range    Significant Indicator NEG     Source BLD     Site PERIPHERAL     Culture Result       No Growth  Note: Blood cultures are incubated for 5 days and  are monitored continuously.Positive blood cultures  are called to the RN and reported as soon as  they are identified.     Blood Culture - Draw one set from central line if present    Specimen: Line; Blood   Result Value Ref Range    Significant Indicator NEG     Source BLD     Site Peripheral     Culture Result       No Growth  Note: Blood cultures are incubated for 5 days and  are monitored continuously.Positive blood cultures  are called to the RN and reported as soon as  they are identified.     CoV-2, FLU A/B, and RSV by PCR (2-4 Hours CEPHEID) : Collect NP swab in VTM    Specimen: Nasopharyngeal; Respirate   Result Value Ref Range    Influenza virus A RNA Negative Negative    Influenza virus B, PCR Negative Negative    RSV, PCR Negative Negative    SARS-CoV-2 by PCR NotDetected     SARS-CoV-2 Source NP Swab    ESTIMATED GFR   Result Value Ref Range    GFR (CKD-EPI) 37 (A) >60 mL/min/1.73 m 2   Procalcitonin   Result Value Ref Range    Procalcitonin 0.53 (H) <0.25 ng/mL   CRP QUANTITIVE (NON-CARDIAC)   Result Value Ref Range    Stat C-Reactive Protein 33.15 (H) 0.00 - 0.75 mg/dL   Sed Rate   Result Value Ref Range    Sed Rate Westergren 73 (H) 0 - 25 mm/hour   URINE MICROSCOPIC (W/UA)   Result Value Ref Range    WBC 0-2 /hpf    RBC 5-10 (A) /hpf    Bacteria Negative None /hpf    Epithelial Cells Negative /hpf    Hyaline Cast 3-5 (A) /lpf   CBC WITH DIFFERENTIAL   Result Value Ref Range    WBC  15.6 (H) 4.8 - 10.8 K/uL    RBC 3.99 (L) 4.20 - 5.40 M/uL    Hemoglobin 11.4 (L) 12.0 - 16.0 g/dL    Hematocrit 35.5 (L) 37.0 - 47.0 %    MCV 89.0 81.4 - 97.8 fL    MCH 28.6 27.0 - 33.0 pg    MCHC 32.1 (L) 32.2 - 35.5 g/dL    RDW 52.2 (H) 35.9 - 50.0 fL    Platelet Count 201 164 - 446 K/uL    MPV 10.0 9.0 - 12.9 fL    Neutrophils-Polys 78.70 (H) 44.00 - 72.00 %    Lymphocytes 12.70 (L) 22.00 - 41.00 %    Monocytes 7.70 0.00 - 13.40 %    Eosinophils 0.00 0.00 - 6.90 %    Basophils 0.30 0.00 - 1.80 %    Immature Granulocytes 0.60 0.00 - 0.90 %    Nucleated RBC 0.00 0.00 - 0.20 /100 WBC    Neutrophils (Absolute) 12.32 (H) 1.82 - 7.42 K/uL    Lymphs (Absolute) 1.98 1.00 - 4.80 K/uL    Monos (Absolute) 1.20 (H) 0.00 - 0.85 K/uL    Eos (Absolute) 0.00 0.00 - 0.51 K/uL    Baso (Absolute) 0.04 0.00 - 0.12 K/uL    Immature Granulocytes (abs) 0.09 0.00 - 0.11 K/uL    NRBC (Absolute) 0.00 K/uL   Comp Metabolic Panel   Result Value Ref Range    Sodium 140 135 - 145 mmol/L    Potassium 3.6 3.6 - 5.5 mmol/L    Chloride 105 96 - 112 mmol/L    Co2 25 20 - 33 mmol/L    Anion Gap 10.0 7.0 - 16.0    Glucose 118 (H) 65 - 99 mg/dL    Bun 36 (H) 8 - 22 mg/dL    Creatinine 1.17 0.50 - 1.40 mg/dL    Calcium 9.1 8.5 - 10.5 mg/dL    Correct Calcium 10.1 8.5 - 10.5 mg/dL    AST(SGOT) 21 12 - 45 U/L    ALT(SGPT) 12 2 - 50 U/L    Alkaline Phosphatase 86 30 - 99 U/L    Total Bilirubin 0.4 0.1 - 1.5 mg/dL    Albumin 2.8 (L) 3.2 - 4.9 g/dL    Total Protein 6.4 6.0 - 8.2 g/dL    Globulin 3.6 (H) 1.9 - 3.5 g/dL    A-G Ratio 0.8 g/dL   MAGNESIUM   Result Value Ref Range    Magnesium 2.1 1.5 - 2.5 mg/dL   PHOSPHORUS   Result Value Ref Range    Phosphorus 3.0 2.5 - 4.5 mg/dL   HEMOGLOBIN A1C   Result Value Ref Range    Glycohemoglobin 6.3 (H) 4.0 - 5.6 %    Est Avg Glucose 134 mg/dL   D-DIMER   Result Value Ref Range    D-Dimer 6.87 (H) 0.00 - 0.50 ug/mL (FEU)   ESTIMATED GFR   Result Value Ref Range    GFR (CKD-EPI) 46 (A) >60 mL/min/1.73 m 2    Cortisol   Result Value Ref Range    Cortisol 14.5 0.0 - 23.0 ug/dL   Prothrombin time (INR)   Result Value Ref Range    PT 16.4 (H) 12.0 - 14.6 sec    INR 1.30 (H) 0.87 - 1.13   LACTIC ACID   Result Value Ref Range    Lactic Acid 0.7 0.5 - 2.0 mmol/L   CBC WITH DIFFERENTIAL   Result Value Ref Range    WBC 8.3 4.8 - 10.8 K/uL    RBC 4.00 (L) 4.20 - 5.40 M/uL    Hemoglobin 11.2 (L) 12.0 - 16.0 g/dL    Hematocrit 36.3 (L) 37.0 - 47.0 %    MCV 90.8 81.4 - 97.8 fL    MCH 28.0 27.0 - 33.0 pg    MCHC 30.9 (L) 32.2 - 35.5 g/dL    RDW 53.8 (H) 35.9 - 50.0 fL    Platelet Count 197 164 - 446 K/uL    MPV 10.2 9.0 - 12.9 fL    Neutrophils-Polys 73.40 (H) 44.00 - 72.00 %    Lymphocytes 15.90 (L) 22.00 - 41.00 %    Monocytes 7.00 0.00 - 13.40 %    Eosinophils 1.30 0.00 - 6.90 %    Basophils 0.70 0.00 - 1.80 %    Immature Granulocytes 1.70 (H) 0.00 - 0.90 %    Nucleated RBC 0.00 0.00 - 0.20 /100 WBC    Neutrophils (Absolute) 6.07 1.82 - 7.42 K/uL    Lymphs (Absolute) 1.32 1.00 - 4.80 K/uL    Monos (Absolute) 0.58 0.00 - 0.85 K/uL    Eos (Absolute) 0.11 0.00 - 0.51 K/uL    Baso (Absolute) 0.06 0.00 - 0.12 K/uL    Immature Granulocytes (abs) 0.14 (H) 0.00 - 0.11 K/uL    NRBC (Absolute) 0.00 K/uL   EKG   Result Value Ref Range    Report       [facility] Emergency Dept.    Test Date:  2023-10-09  Pt Name:    GUERRERO HOOPER               Department: ER  MRN:        [id number]                      Room:  Gender:     Female                       Technician: [id number]  :        [id number]                   Requested By:ER TRIAGE PROTOCOL  Order #:    [id number]                    Reading MD:    Measurements  Intervals                                Axis  Rate:       113                          P:          88  AR:         163                          QRS:        105  QRSD:       102                          T:          -57  QT:         318  QTc:        436    Interpretive Statements  Sinus tachycardia  Right axis  deviation  Repol abnrm suggests ischemia, diffuse leads  Compared to ECG 07/03/2014 13:43:20  Right-axis deviation now present  Possible ischemia now present  Sinus rhythm no longer present     POCT glucose device results   Result Value Ref Range    POC Glucose, Blood 116 (H) 65 - 99 mg/dL   POCT glucose device results   Result Value Ref Range    POC Glucose, Blood 108 (H) 65 - 99 mg/dL   POCT glucose device results   Result Value Ref Range    POC Glucose, Blood 93 65 - 99 mg/dL   POCT glucose device results   Result Value Ref Range    POC Glucose, Blood 135 (H) 65 - 99 mg/dL   POCT glucose device results   Result Value Ref Range    POC Glucose, Blood 127 (H) 65 - 99 mg/dL         RADIOLOGY  I have independently interpreted the diagnostic imaging associated with this visit and am waiting the final reading from the radiologist.   My preliminary interpretation is as follows: Right sided consolidative process  Radiologist interpretation:   DX-ESOPHAGUS - [id number]   Final Result      CT-CTA CHEST PULMONARY ARTERY W/ RECONS   Final Result         1. No pulmonary embolus.   2. Patchy bilateral infiltrates, consistent with pneumonia. Recommend follow-up to ensure resolution.   3. Mild mediastinal and hilar adenopathy, likely reactive. Consider follow-up chest CT in 3 months to ensure resolution.   4. Small, 2 similar sliding hiatal hernia.   5. Simple liver cysts, which do not require follow-up.         DX-CHEST-PORTABLE (1 VIEW)   Final Result      Ill-defined opacities in the right mid and lower lungs. They may represent activity pneumonia.      EC-ECHOCARDIOGRAM COMPLETE W/O CONT    (Results Pending)   US-EXTREMITY VENOUS LOWER BILAT    (Results Pending)         COURSE & MEDICAL DECISION MAKING    ED Observation Status? No; Patient does not meet criteria for ED Observation.     INITIAL ASSESSMENT, COURSE AND PLAN  Care Narrative: Patient presenting emerged part with respiratory failure, hypoxia and concern for  infectious etiology.  Viral or bacterial.  We will evaluate accordingly.  Sepsis protocol be followed.    DISPOSITION AND DISCUSSIONS  I have discussed management of the patient with the following physicians and KEAGAN's: Hospitalist    Discussion of management with other Q or appropriate source(s): Pharmacy for medication verification      82-year-old female presenting the emergency department with the above presentation.  Findings suggest sepsis secondary to right-sided pneumonia.  Additionally the patient is hyperglycemic today.  This is probably driven by infectious etiology to be higher than typical.  At this point the patient is requiring ongoing oxygen supplementation.  She will be started on IV antibiotics, fluids as per sepsis protocol.  She does not appear to be in profound septic shock.  I have discussed the case with the hospitalist was agreeable to ongoing inpatient care.        FINAL DIAGNOSIS  1. Sepsis with acute hypoxic respiratory failure without septic shock, due to unspecified organism (HCC)    2. Pneumonia of right lower lobe due to infectious organism    3. Hyperglycemia           Electronically signed by: Hermes Mata M.D., 10/9/2023 7:13 PM

## 2023-10-10 NOTE — HOSPITAL COURSE
82-year-old female with history of moderate to advanced dementia, history of hypertension, and hypothyroidism who presented 10/9 with shortness of breath.  Patient has dementia not able to provide a good history however patient was found to have hypoxia with shortness of breath, patient lives with her daughters who are the caregiver.  On admission patient was found to have leukocytosis with elevated lactic acid, chest x-ray showed right-sided pneumonia, CT scan did not show PE however showed signs of pneumonia.  IV antibiotics Unasyn with the fluid were started.  Speech evaluated the patient.    The goal of care was discussed in detail with the patient's daughter, agreed for DNR/DNI, continue following.

## 2023-10-10 NOTE — PROGRESS NOTES
Report received from Maggy RN, assumed care of patient. Patient up to new room from ED. Patient on monitor, tech confirmed patient is sinus rhythm 80's HR. Daughters at  bedside, patient oriented to new room, call light and belongings within reach, bed locked and in lowest position, bed alarm on and working. Fall precautions in place. Patient is A&Ox3, disoriented to time. All questions answered, no further needs at this time.     10/10/23  0315: Patient's D-Dimer at 6.87, JOYCE Gayle notified. CT scan to be ordered.

## 2023-10-10 NOTE — PROGRESS NOTES
Assumed care of patient and received morning report from night RN. VSS stable, on 5L O2. Tele monitor in place. Bed locked and in lowest position. Pt resting in bed. Fall precautions in place. Call light within reach. No other needs indicated at this time.

## 2023-10-10 NOTE — ASSESSMENT & PLAN NOTE
This is Sepsis Present on admission  SIRS criteria identified on my evaluation include: Tachycardia, with heart rate greater than 90 BPM, Tachypnea, with respirations greater than 20 per minute, Leukocytosis, with WBC greater than 12,000 and Bandemia, greater than 10% bands  Clinical indicators of end organ dysfunction include Lactic Acid greater than 2, Acute Renal Failure, with a finding of creatinine greater than 2 (patient does not have ESRD or CKD and Acute Renal Failure, with urine output less than 0.5 ml/kg/hr for 2 consecutive hours  Source is pulm  Sepsis protocol initiated  Crystalloid Fluid Administration: Fluid resuscitation ordered per standard protocol - 30 mL/kg per current or ideal body weight  IV antibiotics as appropriate for source of sepsis  Reassessment: I have reassessed the patient's hemodynamic status  Resolved

## 2023-10-10 NOTE — CARE PLAN
The patient is Stable - Low risk of patient condition declining or worsening    Shift Goals  Clinical Goals: Monitor O2 Sats, abx therapy, safety  Patient Goals: Rest, comfort  Family Goals: Update POC      Problem: Knowledge Deficit - Standard  Goal: Patient and family/care givers will demonstrate understanding of plan of care, disease process/condition, diagnostic tests and medications  Outcome: Progressing  Note: Patient and family educated on POC, verbalizes understanding.     Problem: Hemodynamics  Goal: Patient's hemodynamics, fluid balance and neurologic status will be stable or improve  Outcome: Progressing  Note: Monitoring labs and VS.     Problem: Respiratory  Goal: Patient will achieve/maintain optimum respiratory ventilation and gas exchange  Outcome: Progressing  Note: Patient on 4-5 liters of O2 on oxymask satting 95%.      Problem: Skin Integrity  Goal: Skin integrity is maintained or improved  Outcome: Progressing     Problem: Fall Risk  Goal: Patient will remain free from falls  Outcome: Progressing     Problem: Safety - Medical Restraint  Goal: Remains free of injury from restraints (Restraint for Interference with Medical Device)  Outcome: Progressing  Goal: Free from restraint(s) (Restraint for Interference with Medical Device)  Outcome: Progressing       Progress made toward(s) clinical / shift goals:  Progressing

## 2023-10-10 NOTE — ASSESSMENT & PLAN NOTE
Likely secondary to pneumonia  CTA did not show PE  Echo notes Normal left ventricular size and systolic function.  Noted BNP 1026    10/13: Start iv lasix  Continue Unasyn   Continue weaning oxygen  Walking O2

## 2023-10-10 NOTE — PROGRESS NOTES
Hospital Medicine Daily Progress Note    Date of Service  10/10/2023    Chief Complaint  Katharine Gee is a 82 y.o. female admitted 10/9/2023 with shortness of breath    Hospital Course  82-year-old female with history of moderate to advanced dementia, history of hypertension, and hypothyroidism who presented 10/9 with shortness of breath.  Patient has dementia not able to provide a good history however patient was found to have hypoxia with shortness of breath, patient lives with her daughters who are the caregiver.  On admission patient was found to have leukocytosis with elevated lactic acid, chest x-ray showed right-sided pneumonia, CT scan did not show PE however showed signs of pneumonia.  IV antibiotics Unasyn with the fluid were started.  Speech evaluated the patient.    The goal of care was discussed in detail with the patient's daughter, agreed for DNR/DNI, continue following.    Interval Problem Update  -Evaluated examined the patient at bedside, patient is alert to herself and place with confusing, around her baseline due to advanced dementia.  -Case was discussed with the speech, planning for barium study, patient is on high risk for aspiration.  -Continue IV Unasyn, patient still on 4 L nasal cannula, continue weaning her from oxygen.  -Case was discussed with her daughters, discussed the goal of care and CODE STATUS, DNR/DNI.      I have discussed this patient's plan of care and discharge plan at IDT rounds today with Case Management, Nursing, Nursing leadership, and other members of the IDT team.    Consultants/Specialty  None      Code Status  DNAR/DNI    Disposition  The patient is not medically cleared for discharge to home or a post-acute facility.  Anticipate discharge to: home with organized home healthcare and close outpatient follow-up    I have placed the appropriate orders for post-discharge needs.    Review of Systems  Review of Systems   Unable to perform ROS: Dementia    Constitutional:  Negative for chills, fever and weight loss.   HENT:  Negative for ear pain, hearing loss and tinnitus.    Eyes:  Negative for blurred vision, double vision and photophobia.   Respiratory:  Negative for cough and hemoptysis.    Cardiovascular:  Negative for chest pain, palpitations, orthopnea and claudication.   Gastrointestinal:  Negative for abdominal pain, constipation, diarrhea, nausea and vomiting.   Genitourinary:  Negative for dysuria, frequency and urgency.   Musculoskeletal:  Negative for myalgias and neck pain.   Skin:  Negative for rash.   Neurological:  Negative for dizziness, speech change and weakness.        Physical Exam  Temp:  [36.1 °C (96.9 °F)-37 °C (98.6 °F)] 36.3 °C (97.3 °F)  Pulse:  [] 66  Resp:  [15-20] 16  BP: ()/() 102/49  SpO2:  [87 %-97 %] 93 %    Physical Exam  Constitutional:       General: She is not in acute distress.     Appearance: She is not ill-appearing.   Cardiovascular:      Rate and Rhythm: Normal rate.      Heart sounds: No murmur heard.  Pulmonary:      Effort: Respiratory distress present.      Breath sounds: Rales present.   Abdominal:      General: There is no distension.      Palpations: Abdomen is soft.      Tenderness: There is no abdominal tenderness.   Musculoskeletal:         General: No deformity.      Right lower leg: No edema.      Left lower leg: No edema.   Skin:     Coloration: Skin is not jaundiced or pale.      Findings: No bruising, lesion or rash.   Neurological:      General: No focal deficit present.      Mental Status: She is oriented to person, place, and time. Mental status is at baseline.      Cranial Nerves: No cranial nerve deficit.      Motor: No weakness.         Fluids    Intake/Output Summary (Last 24 hours) at 10/10/2023 1331  Last data filed at 10/9/2023 2100  Gross per 24 hour   Intake 200 ml   Output --   Net 200 ml       Laboratory  Recent Labs     10/09/23  1651 10/10/23  0044   WBC 16.5* 15.6*   RBC  4.71 3.99*   HEMOGLOBIN 13.2 11.4*   HEMATOCRIT 41.2 35.5*   MCV 87.5 89.0   MCH 28.0 28.6   MCHC 32.0* 32.1*   RDW 51.2* 52.2*   PLATELETCT 225 201   MPV 9.8 10.0     Recent Labs     10/09/23  1651 10/10/23  0044   SODIUM 143 140   POTASSIUM 3.8 3.6   CHLORIDE 106 105   CO2 23 25   GLUCOSE 145* 118*   BUN 37* 36*   CREATININE 1.40 1.17   CALCIUM 9.8 9.1     Recent Labs     10/10/23  0711   INR 1.30*               Imaging  CT-CTA CHEST PULMONARY ARTERY W/ RECONS   Final Result         1. No pulmonary embolus.   2. Patchy bilateral infiltrates, consistent with pneumonia. Recommend follow-up to ensure resolution.   3. Mild mediastinal and hilar adenopathy, likely reactive. Consider follow-up chest CT in 3 months to ensure resolution.   4. Small, 2 similar sliding hiatal hernia.   5. Simple liver cysts, which do not require follow-up.         DX-CHEST-PORTABLE (1 VIEW)   Final Result      Ill-defined opacities in the right mid and lower lungs. They may represent activity pneumonia.      EC-ECHOCARDIOGRAM COMPLETE W/O CONT    (Results Pending)   US-EXTREMITY VENOUS LOWER BILAT    (Results Pending)   DX-ESOPHAGUS - [id number]    (Results Pending)        Assessment/Plan  * Aspiration pneumonia (HCC)  Assessment & Plan  Came with leukocytosis and infiltrates on the chest x-ray  Patient is on high risk for aspiration due to advanced dementia  Speech on board, appreciate the recommendations  Antibiotic: Unasyn, azithromycin  Follow cultures  Discussed the risk of recurrent aspiration pneumonia with family.    Sepsis (HCC)  Assessment & Plan  This is Sepsis Present on admission  SIRS criteria identified on my evaluation include: Tachycardia, with heart rate greater than 90 BPM, Tachypnea, with respirations greater than 20 per minute, Leukocytosis, with WBC greater than 12,000 and Bandemia, greater than 10% bands  Clinical indicators of end organ dysfunction include Lactic Acid greater than 2, Acute Renal Failure, with a  finding of creatinine greater than 2 (patient does not have ESRD or CKD and Acute Renal Failure, with urine output less than 0.5 ml/kg/hr for 2 consecutive hours  Source is pulm  Sepsis protocol initiated  Crystalloid Fluid Administration: Fluid resuscitation ordered per standard protocol - 30 mL/kg per current or ideal body weight  IV antibiotics as appropriate for source of sepsis  Reassessment: I have reassessed the patient's hemodynamic status      Blood culture is pending  Continue Unasyn and azithromycin  Speech  IV fluid and labs daily    Acute hypoxemic respiratory failure (HCC)- (present on admission)  Assessment & Plan  Likely secondary to pneumonia  CTA did not show PE  Continue Unasyn  Continue weaning her from oxygen  Echo is pending    Dementia ([facility])  Assessment & Plan  Continue home Aricept, Seroquel  As needed Haldol  Minimize sedative  Frequent reorientation    Dyslipidemia- (present on admission)  Assessment & Plan  Continue statin    CKD (chronic kidney disease) stage 3, GFR 30-59 ml/min ([facility])- (present on admission)  Assessment & Plan  Minimize nephrotoxic agents, renally dose meds  Her creatinine around baseline 1.1-1.4    Class 2 obesity in adult  Assessment & Plan  Diet and lifestyle modification  Body mass index is 36.05 kg/m².      Acquired hypothyroidism- (present on admission)  Assessment & Plan  Synthroid 25 mcg daily  Repeat TSH tomorrow    ACP (advance care planning)  Assessment & Plan  Goal of care discussed with patient and patient's 2 daughters in ER.  Patient and family agreeable for noninvasive, as well as invasive/heroic life-sustaining measures-including CPR/defibrillation/intubation mechanical ventilation as needed.  Diagnosis, prognosis, questions concern addressed.  Full CODE STATUS confirmed.  ACP: 16 minutes    On 10/10 the goal of care was discussed in detail with her daughters, discussed the CODE STATUS, discussed the prognosis of her dementia, discussed the risk of  recurrent aspiration, daughters agreed for DNR/DNI.  Time 25-minute    HTN (hypertension)- (present on admission)  Assessment & Plan  Continue amlodipine 5 mg daily         VTE prophylaxis:   SCDs/TEDs   enoxaparin ppx      I have performed a physical exam and reviewed and updated ROS and Plan today (10/10/2023). In review of yesterday's note (10/9/2023), there are no changes except as documented above.    Greater than 51 minutes spent prepping to see patient (e.g. review of tests) obtaining and/or reviewing separately obtained history. Performing a medically appropriate examination and/ evaluation.  Counseling and educating the patient/family/caregiver.  Ordering medications, tests, or procedures.  Referring and communicating with other health care professionals.  Documenting clinical information in EPIC.  Independently interpreting results and communicating results to patient/family/caregiver.  Care coordination

## 2023-10-10 NOTE — ASSESSMENT & PLAN NOTE
Goal of care discussed with patient and patient's 2 daughters in ER.  Patient and family agreeable for noninvasive, as well as invasive/heroic life-sustaining measures-including CPR/defibrillation/intubation mechanical ventilation as needed.  Diagnosis, prognosis, questions concern addressed.  Full CODE STATUS confirmed.  ACP: 16 minutes    On 10/10 the goal of care was discussed in detail with her daughters, discussed the CODE STATUS, discussed the prognosis of her dementia, discussed the risk of recurrent aspiration, daughters agreed for DNR/DNI.  Time 25-minute

## 2023-10-10 NOTE — CARE PLAN
The patient is Stable - Low risk of patient condition declining or worsening    Shift Goals  Clinical Goals: monitor O2, abx, safety  Patient Goals: rest, comfort  Family Goals: bobbi    Progress made toward(s) clinical / shift goals:  Patient stable through shift

## 2023-10-10 NOTE — H&P
Hospital Medicine History & Physical Note    Date of Service  10/9/2023    Primary Care Physician  Meir Thomason M.D.    Consultants  None    Code Status  Full Code    Chief Complaint  Chief Complaint   Patient presents with    Cold Symptoms     Since Thursday, cough, fever, sob, hypoxia on RA.     Shortness of Breath       History of Presenting Illness  Katharine Gee is a 82 y.o. female with history of dementia, hypertension, hypothyroidism who presented 10/9/2023 with evaluation for URI symptoms, hypoxia.  History obtained from patient's 2 daughters were present at ER as patient is poor historian and demented.  Per daughters, patient has not been feeling well for the past month, reported intermittent cough, fever, chills.  She was found to be hypoxic in ER requiring as much as 4 L.  Also noted to produce of 16.5 K, CXR concerning for right-sided pneumonia.  Admitted to medicine service for further evaluation and treatment.    I discussed the plan of care with patient, family, bedside RN, and pharmacy.    Review of Systems  Review of Systems   Constitutional:  Positive for chills, fever and malaise/fatigue.   HENT:  Negative for hearing loss and tinnitus.    Eyes:  Negative for blurred vision and double vision.   Respiratory:  Positive for cough, sputum production, shortness of breath and wheezing.    Cardiovascular:  Negative for chest pain and palpitations.   Gastrointestinal:  Positive for heartburn and nausea. Negative for abdominal pain and vomiting.   Genitourinary:  Negative for dysuria and hematuria.   Musculoskeletal:  Negative for joint pain and myalgias.   Skin:  Negative for itching and rash.   Neurological:  Positive for weakness. Negative for dizziness and headaches.   Endo/Heme/Allergies:  Negative for environmental allergies. Does not bruise/bleed easily.   Psychiatric/Behavioral:  Negative for depression and substance abuse.    All other systems reviewed and are negative.      Past  Medical History   has a past medical history of Acquired hypothyroidism (7/18/2017), Arthritis, Carotid artery stenosis (10/19/2020), HTN (hypertension), Hypertension, Kidney stone, Memory loss (10/4/2017), Menopause (2/2/2015), Migraine, Obesity (BMI 30-39.9) (4/5/2018), and Polymyalgia rheumatica (HCC).    Surgical History   has a past surgical history that includes pr revise secondary varicosity; exploratory laparotomy (7/3/2014); bowel resection (7/3/2014); tonsillectomy; and vaginal hysterectomy total.     Family History  family history includes Dementia in her brother; Diabetes in her brother and maternal grandmother; Heart Attack in her father; Heart Disease in her mother.   Family history reviewed with patient. There is family history that is pertinent to the chief complaint.     Social History   reports that she has never smoked. She has never used smokeless tobacco. She reports current alcohol use. She reports that she does not use drugs.    Allergies  No Known Allergies    Medications  Prior to Admission Medications   Prescriptions Last Dose Informant Patient Reported? Taking?   QUEtiapine (SEROQUEL) 25 MG Tab   No No   Sig: TAKE 2 TABLETS BY MOUTH TWICE DAILY   QUEtiapine (SEROQUEL) 50 MG tablet   No No   Sig: Take 1 Tablet by mouth 2 times a day.   amLODIPine (NORVASC) 5 MG Tab   No No   Sig: Take 1 Tablet by mouth every day.   aspirin 81 MG tablet   Yes No   Sig: Take 81 mg by mouth every day.   atorvastatin (LIPITOR) 20 MG Tab   No No   Sig: TAKE 1 TABLET BY MOUTH EVERY DAY AT BEDTIME   donepezil (ARICEPT) 10 MG tablet   No No   Sig: Take 1 Tablet by mouth every evening.   levothyroxine (SYNTHROID) 25 MCG Tab   No No   Sig: TAKE 1 TABLET BY MOUTH EVERY MORNING ON AN EMPTY STOMACH   lisinopril (PRINIVIL) 30 MG tablet   No No   Sig: TAKE 1 TABLET BY MOUTH EVERY DAY   melatonin 3 MG Tab   No No   Sig: Take 1 Tablet by mouth at bedtime.   tolterodine ER (DETROL LA) 4 MG CAPSULE SR 24 HR   Yes No   Sig:  Take 4 mg by mouth every day.      Facility-Administered Medications: None       Physical Exam  Temp:  [36.1 °C (96.9 °F)-36.7 °C (98.1 °F)] 36.7 °C (98.1 °F)  Pulse:  [] 87  Resp:  [18-20] 20  BP: (112-150)/() 112/47  SpO2:  [87 %-97 %] 95 %  Blood Pressure : 127/58   Temperature: 36.1 °C (96.9 °F)   Pulse: 99   Respiration: 20   Pulse Oximetry: 96 %       Physical Exam  Vitals and nursing note reviewed.   Constitutional:       Appearance: She is obese. She is ill-appearing.   HENT:      Head: Normocephalic and atraumatic.      Ears:      Comments: presbycusis     Nose: Nose normal.      Mouth/Throat:      Mouth: Mucous membranes are dry.      Pharynx: Oropharynx is clear.   Eyes:      General: No scleral icterus.     Extraocular Movements: Extraocular movements intact.   Cardiovascular:      Rate and Rhythm: Regular rhythm. Tachycardia present.      Pulses: Normal pulses.      Heart sounds:      No friction rub.   Pulmonary:      Breath sounds: No stridor. Wheezing and rales present.   Chest:      Chest wall: No tenderness.   Abdominal:      General: There is distension.      Tenderness: There is no abdominal tenderness. There is no guarding or rebound.      Hernia: A hernia (reducible) is present.   Musculoskeletal:         General: Normal range of motion.      Cervical back: Neck supple. No tenderness.      Right lower leg: No edema.      Left lower leg: No edema.   Skin:     General: Skin is warm and dry.      Capillary Refill: Capillary refill takes less than 2 seconds.   Neurological:      General: No focal deficit present.      Mental Status: She is alert.      Comments: AAO to self, time  Pleasant  Follow commands appropriately   Psychiatric:         Mood and Affect: Mood normal.         Laboratory:  Recent Labs     10/09/23  1651   WBC 16.5*   RBC 4.71   HEMOGLOBIN 13.2   HEMATOCRIT 41.2   MCV 87.5   MCH 28.0   MCHC 32.0*   RDW 51.2*   PLATELETCT 225   MPV 9.8     Recent Labs     10/09/23  1651  "  SODIUM 143   POTASSIUM 3.8   CHLORIDE 106   CO2 23   GLUCOSE 145*   BUN 37*   CREATININE 1.40   CALCIUM 9.8     Recent Labs     10/09/23  1651   ALTSGPT 15   ASTSGOT 21   ALKPHOSPHAT 105*   TBILIRUBIN 0.4   GLUCOSE 145*         No results for input(s): \"NTPROBNP\" in the last 72 hours.      No results for input(s): \"TROPONINT\" in the last 72 hours.    Imaging:  DX-CHEST-PORTABLE (1 VIEW)   Final Result      Ill-defined opacities in the right mid and lower lungs. They may represent activity pneumonia.      EC-ECHOCARDIOGRAM COMPLETE W/O CONT    (Results Pending)   US-EXTREMITY VENOUS LOWER BILAT    (Results Pending)       X-Ray:  I have personally reviewed the images and compared with prior images.  EKG:  I have personally reviewed the images and compared with prior images.    Assessment/Plan:  Justification for Admission Status  I anticipate this patient will require at least 2 midnights hospitalization, therefore appropriate for inpatient status.        * Aspiration pneumonia (HCC)  Assessment & Plan  Suspected  Right-sided infiltrates on CXR  Aspiration precautions, speech eval  Antibiotic: Unasyn, azithromycin  Follow cultures    Sepsis (HCC)  Assessment & Plan  This is Sepsis Present on admission  SIRS criteria identified on my evaluation include: Tachycardia, with heart rate greater than 90 BPM, Tachypnea, with respirations greater than 20 per minute, Leukocytosis, with WBC greater than 12,000 and Bandemia, greater than 10% bands  Clinical indicators of end organ dysfunction include Lactic Acid greater than 2, Acute Renal Failure, with a finding of creatinine greater than 2 (patient does not have ESRD or CKD and Acute Renal Failure, with urine output less than 0.5 ml/kg/hr for 2 consecutive hours  Source is pulm  Sepsis protocol initiated  Crystalloid Fluid Administration: Fluid resuscitation ordered per standard protocol - 30 mL/kg per current or ideal body weight  IV antibiotics as appropriate for source of " sepsis  Reassessment: I have reassessed the patient's hemodynamic status    Acute hypoxemic respiratory failure (HCC)- (present on admission)  Assessment & Plan  Likely secondary to pneumonia  Antibiotic  Nebs, pulm toilet  Check echo    Dementia (HCC)  Assessment & Plan  Continue home Aricept, Seroquel  As needed Haldol  Minimize sedative  Frequent reorientation    Dyslipidemia- (present on admission)  Assessment & Plan  Statin    CKD (chronic kidney disease) stage 3, GFR 30-59 ml/min (HCC)- (present on admission)  Assessment & Plan  Minimize nephrotoxic agents, renally dose meds    Acquired hypothyroidism- (present on admission)  Assessment & Plan  Synthroid    HTN (hypertension)- (present on admission)  Assessment & Plan  Amlodipine    Class 2 obesity in adult  Assessment & Plan  Diet and lifestyle modification  Body mass index is 36.05 kg/m².      ACP (advance care planning)  Assessment & Plan  Goal of care discussed with patient and patient's 2 daughters in ER.  Patient and family agreeable for noninvasive, as well as invasive/heroic life-sustaining measures-including CPR/defibrillation/intubation mechanical ventilation as needed.  Diagnosis, prognosis, questions concern addressed.  Full CODE STATUS confirmed.  ACP: 16 minutes        VTE prophylaxis: heparin ppx

## 2023-10-10 NOTE — ED NOTES
Med rec complete per patient  Allergies reviewed.   Patient denies any outpatient antibiotics in the last 30 days.   Patient takes Aspirin 81 mg daily, last dose 10/8/23 in the morning    Preferred pharmacy for this visit - [facility] on [address]     Jennifer T Repay, OLGAhT

## 2023-10-10 NOTE — DISCHARGE PLANNING
Case Management Discharge Planning    Admission Date: 10/9/2023  GMLOS:    ALOS: 1    6-Clicks ADL Score: 15  6-Clicks Mobility Score: 12  PT and/or OT/ST Eval ordered: Yes  Post-acute Referrals Ordered: Yes Pending PT/OT recs  Post-acute Choice Obtained: No  Has referral(s) been sent to post-acute provider:  No      Anticipated Discharge Dispo: Discharge Disposition: Discharged to home/self care (01)    DME Needed: Pending    Action(s) Taken: Updated Provider/Nurse on Discharge Plan    Pending PT/OT recommendations    Escalations Completed: None    Medically Clear: No    Next Steps: CM will continue to assist Pt with discharge needs.    Barriers to Discharge: Medical clearance and Pending PT Evaluation    Is the patient up for discharge tomorrow: No       Care Transition Team Assessment  RN CM spoke with Pt's daughter Yolande through telephone to obtain assessment information. Demographics obtained. Pt lives in a 1 story house with 1 step to main entrance, together with daughter Yolande and Yolande's . Pt has 2 daughter for support. Pt owns and sometimes uses a quad cane.    Information Source  Orientation Level: Oriented to place, Oriented to situation, Oriented to person, Disoriented to time  Information Given By: Other (Comments) (Daughter)  Informant's Name: Yolande  Who is responsible for making decisions for patient? : Adult child  Name(s) of Primary Decision Maker: Lesia Otto and Yolande Dailey    Readmission Evaluation  Is this a readmission?: No    Elopement Risk  Legal Hold: No  Ambulatory or Self Mobile in Wheelchair: No-Not an Elopement Risk  Disoriented: Time-At Risk for Elopement  Psychiatric Symptoms: None  History of Wandering: No  Elopement this Admit: No  Vocalizing Wanting to Leave: No  Displays Behaviors, Body Language Wanting to Leave: No-Not at Risk for Elopement  Picture of Patient on Inside Chart Front Cover: No (See Comments)  Purple Armband on Patient: No (See  Comments)    Interdisciplinary Discharge Planning  Primary Care Physician: AMANDA MOSES M.D.  Lives with - Patient's Self Care Capacity: Adult Children (Lives with Daughter Yolande and Yolande's )  Patient or legal guardian wants to designate a caregiver: Yes  Caregiver name: Yolande  Caregiver contact info: [phone]  Support Systems: Family Member(s)  Housing / Facility: 1 Story House (1 step to main entrance)  Able to Return to Previous ADL's: Future Time w/Therapy  Mobility Issues: Yes  Assistance Needed: Unknown at this Time  Durable Medical Equipment: Other - Specify (owns a Quad cane)    Discharge Preparedness  What is your plan after discharge?: Home with help  What are your discharge supports?: Child  Prior Functional Level: Uses Cane, Ambulatory    Functional Assesment  Prior Functional Level: Uses Cane, Ambulatory    Finances  Financial Barriers to Discharge: No  Prescription Coverage: Yes    Vision / Hearing Impairment  Vision Impairment : Yes  Right Eye Vision: Impaired, Wears Glasses  Left Eye Vision: Impaired, Wears Glasses  Hearing Impairment : No              Domestic Abuse  Have you ever been the victim of abuse or violence?: No  Physical Abuse or Sexual Abuse: No  Verbal Abuse or Emotional Abuse: No  Possible Abuse/Neglect Reported to:: Not Applicable    Psychological Assessment  History of Substance Abuse: None  History of Psychiatric Problems: No  Non-compliant with Treatment: No  Newly Diagnosed Illness: Yes    Discharge Risks or Barriers  Patient risk factors: Vulnerable adult    Anticipated Discharge Information  Discharge Disposition: Discharged to home/self care (01)

## 2023-10-10 NOTE — PROGRESS NOTES
4 Eyes Skin Assessment Completed by AMAURY Stephenson and AMAURY Mahoney.    Head WDL  Ears Redness and Blanching  Nose WDL  Mouth WDL  Neck WDL  Breast/Chest Redness & excoriation under right breast      Shoulder Blades Redness and Blanching  Spine WDL  (R) Arm/Elbow/Hand Bruising  (L) Arm/Elbow/Hand Bruising  Abdomen Distention from hernia, excoriation under right side of panis      Groin Redness and Excoriation  Scrotum/Coccyx/Buttocks Redness and Blanching  (R) Leg Bruising  (L) Leg Bruising  (R) Heel/Foot/Toe Dryness, slow-to-jesus heels  (L) Heel/Foot/Toe Dryness, slow-to-jesus heels          Devices In Places Tele Box, Blood Pressure Cuff, and Oxy Mask      Interventions In Place Gray Ear Foams, InterDry, Heel Mepilex, Heel Float Boots, Waffle Overlay, Pillows, Q2 Turns, Low Air Loss Mattress, Barrier Cream, ZFlo Pillow, Heels Loaded W/Pillows, and Pressure Redistribution Mattress    Possible Skin Injury Yes    Pictures Uploaded Into Epic Yes  Wound Consult Placed No  RN Wound Prevention Protocol Ordered Yes

## 2023-10-10 NOTE — THERAPY
Speech Language Pathology   Clinical Swallow Evaluation     Patient Name: Katharine Gee  AGE:  82 y.o., SEX:  female  Medical Record #: [id number]  Date of Service: 10/10/2023      History of Present Illness  81 y/o F admit 10/9 w/ URI symptoms including hypoxia. Admit CXR with right sided pneumonia. Pt requiring 4L O2. No records of ST service/dysphagia found in EMR    PMHx: dementia, HTN, hypothyroidism    General Information:  Vitals  O2 (LPM): 5  O2 Delivery Device: Silicone Nasal Cannula  Level of Consciousness: Alert  Patient Behaviors: Confused  Orientation: Self  Follows Directives: Yes - simple commands only    Prior Living Situation & Level of Function:  Housing / Facility: [address]  Lives with - Patient's Self Care Capacity: Adult Children     Communication: unable to establish- pt is a poor historian. Hx of dementia  Swallowing: Pt denies dysphagia; she is a poor historian     Oral Mechanism Evaluation:  Dentition: Good   Facial Symmetry: Equal  Facial Sensation: Pt did not follow commands to assess     Labial Observations: WFL   Lingual Observations: Midline  Motor Speech: WNL       Laryngeal Function:  Secretion Management: Adequate  Voice Quality:  (reduced vocal intensity)  Max Phonation Time (seconds): n/a     Cough: Perceptually weak     Subjective  Pt was sleeping upon entry but awoke to min verbal cueing. She was in bilateral soft wrist restrains and saturating on 5L NC. She was pleasantly confused and oriented x1 for the exam    Assessment  Current Method of Nutrition: Oral diet (slightly thick, puree).  Positioning: Laird's (60-90 degrees)  Bolus Administration: SLP  O2 (LPM): 5 O2 Delivery Device: Silicone Nasal Cannula     Swallowing Trials:  Swallowing Trials  Ice: WFL  Thin Liquid (TN0): Impaired  Pureed (PU4): WFL    Comments: SLP provided 1:1 feeding d/t pt's mentation and generalized weakness. Oral stage appeared generally WNL. No overt coughing or reports of dysphagia  were noted across exam. After thin liquids trials, noted delayed upper airway wetness however vocal quality remained dry. Further trials were deferred for instrumentation.     Clinical Impressions  Clinical signs of oropharyngeal swallowing within normal limits; however d/t PMH there are concerns of silent aspiration w/ risk factors for dysphagia. Noted intermittent upper airway wetness w/ thin liquids at bedside. Instrumentation is indicated prior to resuming an oral diet. Further ST recs to follow.     Recommendations  Diet Consistency: NPO pending instrumentation  Instrumentation: FEES  Medication: Crush with applesauce, as appropriate     Oral Care: Q4h     SLP Treatment Plan  Treatment Plan: Dysphagia Treatment  SLP Frequency: 3x Per Week  Estimated Duration: Until Therapy Goals Met    Anticipated Discharge Needs  Discharge Recommendations: Recommend post-acute placement for additional speech therapy services prior to discharge home        Patient / Family Goals  Patient / Family Goal #1: I am thirsty  Short Term Goals  Short Term Goal # 1: Pt will participate in instrumentation to define swallow physiology and determine ST POC  Short Term Goal # 2: Pt will tolerate the safest, least restrictive diet textures with no signs of aspiration related pulmonary complications      Bina Taylor, SLP

## 2023-10-11 ENCOUNTER — APPOINTMENT (OUTPATIENT)
Dept: CARDIOLOGY | Facility: MEDICAL CENTER | Age: 83
DRG: 871 | End: 2023-10-11
Attending: STUDENT IN AN ORGANIZED HEALTH CARE EDUCATION/TRAINING PROGRAM
Payer: MEDICARE

## 2023-10-11 ENCOUNTER — APPOINTMENT (OUTPATIENT)
Dept: RADIOLOGY | Facility: MEDICAL CENTER | Age: 83
DRG: 871 | End: 2023-10-11
Attending: STUDENT IN AN ORGANIZED HEALTH CARE EDUCATION/TRAINING PROGRAM
Payer: MEDICARE

## 2023-10-11 LAB
ANION GAP SERPL CALC-SCNC: 9 MMOL/L (ref 7–16)
BACTERIA UR CULT: NORMAL
BASOPHILS # BLD AUTO: 0.7 % (ref 0–1.8)
BASOPHILS # BLD: 0.06 K/UL (ref 0–0.12)
BUN SERPL-MCNC: 21 MG/DL (ref 8–22)
CALCIUM SERPL-MCNC: 8.9 MG/DL (ref 8.5–10.5)
CHLORIDE SERPL-SCNC: 108 MMOL/L (ref 96–112)
CO2 SERPL-SCNC: 22 MMOL/L (ref 20–33)
CREAT SERPL-MCNC: 0.67 MG/DL (ref 0.5–1.4)
CRP SERPL HS-MCNC: 20.67 MG/DL (ref 0–0.75)
EOSINOPHIL # BLD AUTO: 0.11 K/UL (ref 0–0.51)
EOSINOPHIL NFR BLD: 1.3 % (ref 0–6.9)
ERYTHROCYTE [DISTWIDTH] IN BLOOD BY AUTOMATED COUNT: 53.8 FL (ref 35.9–50)
GFR SERPLBLD CREATININE-BSD FMLA CKD-EPI: 86 ML/MIN/1.73 M 2
GLUCOSE BLD STRIP.AUTO-MCNC: 106 MG/DL (ref 65–99)
GLUCOSE BLD STRIP.AUTO-MCNC: 126 MG/DL (ref 65–99)
GLUCOSE BLD STRIP.AUTO-MCNC: 149 MG/DL (ref 65–99)
GLUCOSE BLD STRIP.AUTO-MCNC: 99 MG/DL (ref 65–99)
GLUCOSE SERPL-MCNC: 120 MG/DL (ref 65–99)
HCT VFR BLD AUTO: 36.3 % (ref 37–47)
HGB BLD-MCNC: 11.2 G/DL (ref 12–16)
IMM GRANULOCYTES # BLD AUTO: 0.14 K/UL (ref 0–0.11)
IMM GRANULOCYTES NFR BLD AUTO: 1.7 % (ref 0–0.9)
LV EJECT FRACT  99904: 60
LYMPHOCYTES # BLD AUTO: 1.32 K/UL (ref 1–4.8)
LYMPHOCYTES NFR BLD: 15.9 % (ref 22–41)
MAGNESIUM SERPL-MCNC: 2 MG/DL (ref 1.5–2.5)
MCH RBC QN AUTO: 28 PG (ref 27–33)
MCHC RBC AUTO-ENTMCNC: 30.9 G/DL (ref 32.2–35.5)
MCV RBC AUTO: 90.8 FL (ref 81.4–97.8)
MONOCYTES # BLD AUTO: 0.58 K/UL (ref 0–0.85)
MONOCYTES NFR BLD AUTO: 7 % (ref 0–13.4)
NEUTROPHILS # BLD AUTO: 6.07 K/UL (ref 1.82–7.42)
NEUTROPHILS NFR BLD: 73.4 % (ref 44–72)
NRBC # BLD AUTO: 0 K/UL
NRBC BLD-RTO: 0 /100 WBC (ref 0–0.2)
PLATELET # BLD AUTO: 197 K/UL (ref 164–446)
PMV BLD AUTO: 10.2 FL (ref 9–12.9)
POTASSIUM SERPL-SCNC: 3.9 MMOL/L (ref 3.6–5.5)
PROCALCITONIN SERPL-MCNC: 1.01 NG/ML
RBC # BLD AUTO: 4 M/UL (ref 4.2–5.4)
SIGNIFICANT IND 70042: NORMAL
SITE SITE: NORMAL
SODIUM SERPL-SCNC: 139 MMOL/L (ref 135–145)
SOURCE SOURCE: NORMAL
WBC # BLD AUTO: 8.3 K/UL (ref 4.8–10.8)

## 2023-10-11 PROCEDURE — 94760 N-INVAS EAR/PLS OXIMETRY 1: CPT

## 2023-10-11 PROCEDURE — A9270 NON-COVERED ITEM OR SERVICE: HCPCS | Performed by: INTERNAL MEDICINE

## 2023-10-11 PROCEDURE — 85025 COMPLETE CBC W/AUTO DIFF WBC: CPT

## 2023-10-11 PROCEDURE — 99233 SBSQ HOSP IP/OBS HIGH 50: CPT | Performed by: STUDENT IN AN ORGANIZED HEALTH CARE EDUCATION/TRAINING PROGRAM

## 2023-10-11 PROCEDURE — A9270 NON-COVERED ITEM OR SERVICE: HCPCS | Performed by: STUDENT IN AN ORGANIZED HEALTH CARE EDUCATION/TRAINING PROGRAM

## 2023-10-11 PROCEDURE — 97166 OT EVAL MOD COMPLEX 45 MIN: CPT

## 2023-10-11 PROCEDURE — 93306 TTE W/DOPPLER COMPLETE: CPT | Mod: 26 | Performed by: INTERNAL MEDICINE

## 2023-10-11 PROCEDURE — 84145 PROCALCITONIN (PCT): CPT

## 2023-10-11 PROCEDURE — 93306 TTE W/DOPPLER COMPLETE: CPT

## 2023-10-11 PROCEDURE — 700102 HCHG RX REV CODE 250 W/ 637 OVERRIDE(OP): Performed by: INTERNAL MEDICINE

## 2023-10-11 PROCEDURE — 36415 COLL VENOUS BLD VENIPUNCTURE: CPT

## 2023-10-11 PROCEDURE — 82962 GLUCOSE BLOOD TEST: CPT | Mod: 91

## 2023-10-11 PROCEDURE — 97163 PT EVAL HIGH COMPLEX 45 MIN: CPT

## 2023-10-11 PROCEDURE — 700102 HCHG RX REV CODE 250 W/ 637 OVERRIDE(OP): Performed by: STUDENT IN AN ORGANIZED HEALTH CARE EDUCATION/TRAINING PROGRAM

## 2023-10-11 PROCEDURE — 83735 ASSAY OF MAGNESIUM: CPT

## 2023-10-11 PROCEDURE — 700101 HCHG RX REV CODE 250: Performed by: INTERNAL MEDICINE

## 2023-10-11 PROCEDURE — 93970 EXTREMITY STUDY: CPT

## 2023-10-11 PROCEDURE — 700111 HCHG RX REV CODE 636 W/ 250 OVERRIDE (IP): Performed by: STUDENT IN AN ORGANIZED HEALTH CARE EDUCATION/TRAINING PROGRAM

## 2023-10-11 PROCEDURE — 770006 HCHG ROOM/CARE - MED/SURG/GYN SEMI*

## 2023-10-11 PROCEDURE — 80048 BASIC METABOLIC PNL TOTAL CA: CPT

## 2023-10-11 PROCEDURE — 700105 HCHG RX REV CODE 258: Performed by: STUDENT IN AN ORGANIZED HEALTH CARE EDUCATION/TRAINING PROGRAM

## 2023-10-11 PROCEDURE — 94640 AIRWAY INHALATION TREATMENT: CPT

## 2023-10-11 PROCEDURE — 86140 C-REACTIVE PROTEIN: CPT

## 2023-10-11 RX ORDER — GUAIFENESIN 600 MG/1
600 TABLET, EXTENDED RELEASE ORAL EVERY 12 HOURS
Status: DISCONTINUED | OUTPATIENT
Start: 2023-10-11 | End: 2023-10-14 | Stop reason: HOSPADM

## 2023-10-11 RX ORDER — GUAIFENESIN 200 MG/10ML
10 LIQUID ORAL EVERY 4 HOURS PRN
Status: DISCONTINUED | OUTPATIENT
Start: 2023-10-11 | End: 2023-10-14 | Stop reason: HOSPADM

## 2023-10-11 RX ADMIN — ASPIRIN 81 MG: 81 TABLET, COATED ORAL at 04:40

## 2023-10-11 RX ADMIN — QUETIAPINE FUMARATE 50 MG: 25 TABLET ORAL at 18:35

## 2023-10-11 RX ADMIN — HEPARIN SODIUM 5000 UNITS: 5000 INJECTION, SOLUTION INTRAVENOUS; SUBCUTANEOUS at 04:40

## 2023-10-11 RX ADMIN — QUETIAPINE FUMARATE 50 MG: 25 TABLET ORAL at 04:40

## 2023-10-11 RX ADMIN — Medication 5 MG: at 21:44

## 2023-10-11 RX ADMIN — DOCUSATE SODIUM 50 MG AND SENNOSIDES 8.6 MG 2 TABLET: 8.6; 5 TABLET, FILM COATED ORAL at 04:40

## 2023-10-11 RX ADMIN — AMPICILLIN AND SULBACTAM 3 G: 1; 2 INJECTION, POWDER, FOR SOLUTION INTRAMUSCULAR; INTRAVENOUS at 09:53

## 2023-10-11 RX ADMIN — OMEPRAZOLE 20 MG: 20 CAPSULE, DELAYED RELEASE ORAL at 18:35

## 2023-10-11 RX ADMIN — AMLODIPINE BESYLATE 5 MG: 5 TABLET ORAL at 04:40

## 2023-10-11 RX ADMIN — IPRATROPIUM BROMIDE AND ALBUTEROL SULFATE 3 ML: 2.5; .5 SOLUTION RESPIRATORY (INHALATION) at 03:12

## 2023-10-11 RX ADMIN — THIAMINE HYDROCHLORIDE 100 MG: 100 INJECTION, SOLUTION INTRAMUSCULAR; INTRAVENOUS at 05:58

## 2023-10-11 RX ADMIN — ATORVASTATIN CALCIUM 20 MG: 20 TABLET, FILM COATED ORAL at 21:44

## 2023-10-11 RX ADMIN — OMEPRAZOLE 20 MG: 20 CAPSULE, DELAYED RELEASE ORAL at 04:40

## 2023-10-11 RX ADMIN — DONEPEZIL HYDROCHLORIDE 10 MG: 5 TABLET, FILM COATED ORAL at 18:35

## 2023-10-11 RX ADMIN — IPRATROPIUM BROMIDE AND ALBUTEROL SULFATE 3 ML: 2.5; .5 SOLUTION RESPIRATORY (INHALATION) at 06:35

## 2023-10-11 RX ADMIN — AMPICILLIN AND SULBACTAM 3 G: 1; 2 INJECTION, POWDER, FOR SOLUTION INTRAMUSCULAR; INTRAVENOUS at 02:01

## 2023-10-11 RX ADMIN — GUAIFENESIN 600 MG: 600 TABLET, EXTENDED RELEASE ORAL at 18:35

## 2023-10-11 RX ADMIN — GUAIFENESIN 200 MG: 100 SOLUTION ORAL at 12:36

## 2023-10-11 RX ADMIN — AZITHROMYCIN 500 MG: 250 TABLET, FILM COATED ORAL at 18:34

## 2023-10-11 RX ADMIN — HEPARIN SODIUM 5000 UNITS: 5000 INJECTION, SOLUTION INTRAVENOUS; SUBCUTANEOUS at 13:51

## 2023-10-11 RX ADMIN — LEVOTHYROXINE SODIUM 25 MCG: 0.03 TABLET ORAL at 04:40

## 2023-10-11 RX ADMIN — HEPARIN SODIUM 5000 UNITS: 5000 INJECTION, SOLUTION INTRAVENOUS; SUBCUTANEOUS at 21:44

## 2023-10-11 RX ADMIN — AMPICILLIN AND SULBACTAM 3 G: 1; 2 INJECTION, POWDER, FOR SOLUTION INTRAMUSCULAR; INTRAVENOUS at 15:48

## 2023-10-11 RX ADMIN — IPRATROPIUM BROMIDE AND ALBUTEROL SULFATE 3 ML: 2.5; .5 SOLUTION RESPIRATORY (INHALATION) at 16:03

## 2023-10-11 ASSESSMENT — PAIN DESCRIPTION - PAIN TYPE: TYPE: ACUTE PAIN

## 2023-10-11 ASSESSMENT — COGNITIVE AND FUNCTIONAL STATUS - GENERAL
MOVING FROM LYING ON BACK TO SITTING ON SIDE OF FLAT BED: UNABLE
TOILETING: A LITTLE
DAILY ACTIVITIY SCORE: 21
MOVING TO AND FROM BED TO CHAIR: UNABLE
MOBILITY SCORE: 15
STANDING UP FROM CHAIR USING ARMS: A LITTLE
WALKING IN HOSPITAL ROOM: A LITTLE
SUGGESTED CMS G CODE MODIFIER MOBILITY: CK
CLIMB 3 TO 5 STEPS WITH RAILING: A LITTLE
SUGGESTED CMS G CODE MODIFIER DAILY ACTIVITY: CJ
DRESSING REGULAR LOWER BODY CLOTHING: A LITTLE
HELP NEEDED FOR BATHING: A LITTLE

## 2023-10-11 ASSESSMENT — ENCOUNTER SYMPTOMS
CONSTIPATION: 0
CHILLS: 0
ABDOMINAL PAIN: 0
DIARRHEA: 0
PHOTOPHOBIA: 0
CLAUDICATION: 0
BLURRED VISION: 0
DIZZINESS: 0
COUGH: 0
NAUSEA: 0
HEMOPTYSIS: 0
WEAKNESS: 0
DOUBLE VISION: 0
NECK PAIN: 0
WEIGHT LOSS: 0
MYALGIAS: 0
PALPITATIONS: 0
VOMITING: 0
ORTHOPNEA: 0
SPEECH CHANGE: 0
FEVER: 0

## 2023-10-11 ASSESSMENT — ACTIVITIES OF DAILY LIVING (ADL): TOILETING: INDEPENDENT

## 2023-10-11 ASSESSMENT — GAIT ASSESSMENTS
DEVIATION: DECREASED BASE OF SUPPORT
DISTANCE (FEET): 200
GAIT LEVEL OF ASSIST: STANDBY ASSIST
ASSISTIVE DEVICE: FRONT WHEEL WALKER

## 2023-10-11 NOTE — PROGRESS NOTES
Bedside report received from night RN, pt care assumed. Pt is A&Ox2 to person and place. Pt is medical. Updated on POC, questions answered. Bed in lowest, locked position, treaded socks on, call light and belongings within reach.

## 2023-10-11 NOTE — DISCHARGE PLANNING
HTH/SCP TCN chart review completed. Collaborated with SEBAS Joyner as well as PT and OT. Current discharge considerations are now to dc to home with HH, continued family support (see initial TCN note from 10/10) and possible supplemental 02 if indicated at dc. TCN will continue to follow and collaborate with discharge planning team as additional post acute needs arise. Thank you.    Completed:  PT/OT with recommendations for HH on 10/11(awaiting formal recs though this discussed with PT and OT immediately after evaluation)  Choice obtained: HH ([facility]); [facility] ([facility])  [facility] referral sent 10/10/23

## 2023-10-11 NOTE — PROGRESS NOTES
Hospital Medicine Daily Progress Note    Date of Service  10/11/2023    Chief Complaint  Katharine Gee is a 82 y.o. female admitted 10/9/2023 with shortness of breath    Hospital Course  82-year-old female with history of moderate to advanced dementia, history of hypertension, and hypothyroidism who presented 10/9 with shortness of breath.  Patient has dementia not able to provide a good history however patient was found to have hypoxia with shortness of breath, patient lives with her daughters who are the caregiver.  On admission patient was found to have leukocytosis with elevated lactic acid, chest x-ray showed right-sided pneumonia, CT scan did not show PE however showed signs of pneumonia.  IV antibiotics Unasyn with the fluid were started.  Speech evaluated the patient.    The goal of care was discussed in detail with the patient's daughter, agreed for DNR/DNI, continue following.    Interval Problem Update  No acute overnight events  On 4L O2, continue weaning O2  Continue iv Unasyn and azithromycin  Speech evaluation  PT/OT  Wean O2 as tolerated   10/11: I spoke to daughter over the phone and have updated current treatment plans. Questions were all answered.     I have discussed this patient's plan of care and discharge plan at IDT rounds today with Case Management, Nursing, Nursing leadership, and other members of the IDT team.    Consultants/Specialty  None      Code Status  DNAR/DNI    Disposition  The patient is not medically cleared for discharge to home or a post-acute facility.      I have placed the appropriate orders for post-discharge needs.    Review of Systems  Review of Systems   Unable to perform ROS: Dementia   Constitutional:  Negative for chills, fever and weight loss.   HENT:  Negative for ear pain, hearing loss and tinnitus.    Eyes:  Negative for blurred vision, double vision and photophobia.   Respiratory:  Negative for cough and hemoptysis.    Cardiovascular:  Negative for  chest pain, palpitations, orthopnea and claudication.   Gastrointestinal:  Negative for abdominal pain, constipation, diarrhea, nausea and vomiting.   Genitourinary:  Negative for dysuria, frequency and urgency.   Musculoskeletal:  Negative for myalgias and neck pain.   Skin:  Negative for rash.   Neurological:  Negative for dizziness, speech change and weakness.        Physical Exam  Temp:  [35.8 °C (96.5 °F)-36.5 °C (97.7 °F)] 35.8 °C (96.5 °F)  Pulse:  [69-85] 82  Resp:  [16-20] 20  BP: (114-124)/(50-76) 114/52  SpO2:  [95 %-99 %] 95 %    Physical Exam  Constitutional:       General: She is not in acute distress.     Appearance: She is not ill-appearing.   Cardiovascular:      Rate and Rhythm: Normal rate.      Heart sounds: No murmur heard.  Pulmonary:      Effort: Respiratory distress present.      Breath sounds: Rales present.   Abdominal:      General: There is no distension.      Palpations: Abdomen is soft.      Tenderness: There is no abdominal tenderness.   Musculoskeletal:         General: No deformity.      Right lower leg: No edema.      Left lower leg: No edema.   Skin:     Coloration: Skin is not jaundiced or pale.      Findings: No bruising, lesion or rash.   Neurological:      General: No focal deficit present.      Mental Status: She is oriented to person, place, and time. Mental status is at baseline.      Cranial Nerves: No cranial nerve deficit.      Motor: No weakness.         Fluids    Intake/Output Summary (Last 24 hours) at 10/11/2023 1424  Last data filed at 10/11/2023 0628  Gross per 24 hour   Intake 1250 ml   Output 150 ml   Net 1100 ml         Laboratory  Recent Labs     10/09/23  1651 10/10/23  0044 10/11/23  0641   WBC 16.5* 15.6* 8.3   RBC 4.71 3.99* 4.00*   HEMOGLOBIN 13.2 11.4* 11.2*   HEMATOCRIT 41.2 35.5* 36.3*   MCV 87.5 89.0 90.8   MCH 28.0 28.6 28.0   MCHC 32.0* 32.1* 30.9*   RDW 51.2* 52.2* 53.8*   PLATELETCT 225 201 197   MPV 9.8 10.0 10.2       Recent Labs      10/09/23  1651 10/10/23  0044 10/11/23  0641   SODIUM 143 140 139   POTASSIUM 3.8 3.6 3.9   CHLORIDE 106 105 108   CO2 23 25 22   GLUCOSE 145* 118* 120*   BUN 37* 36* 21   CREATININE 1.40 1.17 0.67   CALCIUM 9.8 9.1 8.9       Recent Labs     10/10/23  0711   INR 1.30*                 Imaging  US-EXTREMITY VENOUS LOWER BILAT   Final Result      EC-ECHOCARDIOGRAM COMPLETE W/O CONT   Final Result      DX-ESOPHAGUS - [id number]   Final Result      CT-CTA CHEST PULMONARY ARTERY W/ RECONS   Final Result         1. No pulmonary embolus.   2. Patchy bilateral infiltrates, consistent with pneumonia. Recommend follow-up to ensure resolution.   3. Mild mediastinal and hilar adenopathy, likely reactive. Consider follow-up chest CT in 3 months to ensure resolution.   4. Small, 2 similar sliding hiatal hernia.   5. Simple liver cysts, which do not require follow-up.         DX-CHEST-PORTABLE (1 VIEW)   Final Result      Ill-defined opacities in the right mid and lower lungs. They may represent activity pneumonia.             Assessment/Plan  * Aspiration pneumonia (HCC)  Assessment & Plan  Came with leukocytosis and infiltrates on the chest x-ray  Patient is on high risk for aspiration due to advanced dementia  Speech on board, appreciate the recommendations  Antibiotic: Unasyn, azithromycin  Follow cultures  Discussed the risk of recurrent aspiration pneumonia with family.  Speech evaluation    Sepsis (HCC)  Assessment & Plan  This is Sepsis Present on admission  SIRS criteria identified on my evaluation include: Tachycardia, with heart rate greater than 90 BPM, Tachypnea, with respirations greater than 20 per minute, Leukocytosis, with WBC greater than 12,000 and Bandemia, greater than 10% bands  Clinical indicators of end organ dysfunction include Lactic Acid greater than 2, Acute Renal Failure, with a finding of creatinine greater than 2 (patient does not have ESRD or CKD and Acute Renal Failure, with urine output less  than 0.5 ml/kg/hr for 2 consecutive hours  Source is pulm  Sepsis protocol initiated  Crystalloid Fluid Administration: Fluid resuscitation ordered per standard protocol - 30 mL/kg per current or ideal body weight  IV antibiotics as appropriate for source of sepsis  Reassessment: I have reassessed the patient's hemodynamic status      Blood culture is pending  Continue Unasyn and azithromycin  Speech  IV fluid and labs daily    Acute hypoxemic respiratory failure (HCC)- (present on admission)  Assessment & Plan  Likely secondary to pneumonia  CTA did not show PE  Continue Unasyn  Continue weaning her from oxygen  Echo notes Normal left ventricular size and systolic function.    Dementia (HCC)  Assessment & Plan  Continue home Aricept, Seroquel  As needed Haldol  Minimize sedative  Frequent reorientation    Dyslipidemia- (present on admission)  Assessment & Plan  Continue statin    CKD (chronic kidney disease) stage 3, GFR 30-59 ml/min ([facility])- (present on admission)  Assessment & Plan  Minimize nephrotoxic agents, renally dose meds  Her creatinine around baseline 1.1-1.4    Class 2 obesity in adult  Assessment & Plan  Diet and lifestyle modification  Body mass index is 36.05 kg/m².      ACP (advance care planning)  Assessment & Plan  Goal of care discussed with patient and patient's 2 daughters in ER.  Patient and family agreeable for noninvasive, as well as invasive/heroic life-sustaining measures-including CPR/defibrillation/intubation mechanical ventilation as needed.  Diagnosis, prognosis, questions concern addressed.  Full CODE STATUS confirmed.  ACP: 16 minutes    On 10/10 the goal of care was discussed in detail with her daughters, discussed the CODE STATUS, discussed the prognosis of her dementia, discussed the risk of recurrent aspiration, daughters agreed for DNR/DNI.  Time 25-minute    Acquired hypothyroidism- (present on admission)  Assessment & Plan  Synthroid 25 mcg daily  TSH normal    HTN  (hypertension)- (present on admission)  Assessment & Plan  Continue amlodipine 5 mg daily         VTE prophylaxis:    heparin ppx      I have performed a physical exam and reviewed and updated ROS and Plan today (10/11/2023). In review of yesterday's note (10/10/2023), there are no changes except as documented above.    Greater than 51 minutes spent prepping to see patient (e.g. review of tests) obtaining and/or reviewing separately obtained history. Performing a medically appropriate examination and/ evaluation.  Counseling and educating the patient/family/caregiver.  Ordering medications, tests, or procedures.  Referring and communicating with other health care professionals.  Documenting clinical information in EPIC.  Independently interpreting results and communicating results to patient/family/caregiver.  Care coordination

## 2023-10-11 NOTE — DISCHARGE PLANNING
SCP TCN chart review completed. Collaborated with REYNALDO Gary prior to meeting with the pt. The most current review of medical record, knowledge of pt's PLOF and social support, LACE+ score of 63 and 6 clicks scores of 15 for ADLs and 12 for mobility were considered. Per chart review, patient on 5L O2 and IV ABX. She also had soft restraints yesterday after pulling IV line and O2 tubing out.    PT/OT evals pending.    SLP swallow eval completed with recommendation for post acute placement.    TCN attempted to visit patient bedside, but she was out of room for Modified Barium Swallow study. Due to documented hx of dementia and RN confirming that patient is confused, TCN called patient's dtr Yolande.  Introduced TCN program. Provided education regarding post acute levels of care. Discussed [facility] plan benefits (Meds to Beds, medical uber and [facility] transitional care). Dtr verbalizes understanding. Patient lives with dtr Yolande, her , and dtr Gerri in [place], [place]. She owns a FWW and SPC, but doesn't use them very often. She is independent with mobility and ADLs. Dtrs help with IADLs and transportation. She does not use home O2 at baseline. Dtr reports patient is very confused at baseline. Dtr is worried patient won't keep nasal cannula in if home O2 is recommended. She is amenable to HH. She will consider SNF choices with her sister Gerri and would like to wait until therapy recommendations are in before giving choice.    Choice proactively obtained for HH and DME([facility]) and faxed to [facility] to be saved in media. TCN will continue to follow and collaborate with discharge planning team as additional post acute needs arise. Thank you.     Completed today:  PT/OT evals pending  SLP eval with recs for post acute placement.  Choice obtained: HH ([facility]); DME ([facility])  [facility] referral sent 10/10/23    *Addendum 1830 - SLP Modified Barium swallow study completed with recommendation for no further therapy needs.

## 2023-10-11 NOTE — CARE PLAN
The patient is Stable - Low risk of patient condition declining or worsening    Shift Goals  Clinical Goals: monitor O2  Patient Goals: rest and comfort  Family Goals: bobbi    Progress made toward(s) clinical / shift goals:  turn q 2; no new skin breakdown    Patient is not progressing towards the following goals:

## 2023-10-12 ENCOUNTER — HOME HEALTH ADMISSION (OUTPATIENT)
Dept: HOME HEALTH SERVICES | Facility: HOME HEALTHCARE | Age: 83
End: 2023-10-12
Payer: MEDICARE

## 2023-10-12 LAB
ANION GAP SERPL CALC-SCNC: 11 MMOL/L (ref 7–16)
BUN SERPL-MCNC: 13 MG/DL (ref 8–22)
CALCIUM SERPL-MCNC: 9.1 MG/DL (ref 8.5–10.5)
CHLORIDE SERPL-SCNC: 106 MMOL/L (ref 96–112)
CO2 SERPL-SCNC: 23 MMOL/L (ref 20–33)
CREAT SERPL-MCNC: 0.78 MG/DL (ref 0.5–1.4)
CRP SERPL HS-MCNC: 16.64 MG/DL (ref 0–0.75)
ERYTHROCYTE [DISTWIDTH] IN BLOOD BY AUTOMATED COUNT: 55.3 FL (ref 35.9–50)
GFR SERPLBLD CREATININE-BSD FMLA CKD-EPI: 74 ML/MIN/1.73 M 2
GLUCOSE BLD STRIP.AUTO-MCNC: 105 MG/DL (ref 65–99)
GLUCOSE BLD STRIP.AUTO-MCNC: 112 MG/DL (ref 65–99)
GLUCOSE BLD STRIP.AUTO-MCNC: 157 MG/DL (ref 65–99)
GLUCOSE BLD STRIP.AUTO-MCNC: 92 MG/DL (ref 65–99)
GLUCOSE SERPL-MCNC: 99 MG/DL (ref 65–99)
HCT VFR BLD AUTO: 42.2 % (ref 37–47)
HGB BLD-MCNC: 12.6 G/DL (ref 12–16)
MAGNESIUM SERPL-MCNC: 2 MG/DL (ref 1.5–2.5)
MCH RBC QN AUTO: 27.9 PG (ref 27–33)
MCHC RBC AUTO-ENTMCNC: 29.9 G/DL (ref 32.2–35.5)
MCV RBC AUTO: 93.4 FL (ref 81.4–97.8)
NT-PROBNP SERPL IA-MCNC: 1026 PG/ML (ref 0–125)
PHOSPHATE SERPL-MCNC: 2.4 MG/DL (ref 2.5–4.5)
PLATELET # BLD AUTO: 246 K/UL (ref 164–446)
PMV BLD AUTO: 10.3 FL (ref 9–12.9)
POTASSIUM SERPL-SCNC: 4 MMOL/L (ref 3.6–5.5)
PROCALCITONIN SERPL-MCNC: 0.62 NG/ML
RBC # BLD AUTO: 4.52 M/UL (ref 4.2–5.4)
SODIUM SERPL-SCNC: 140 MMOL/L (ref 135–145)
WBC # BLD AUTO: 7.8 K/UL (ref 4.8–10.8)

## 2023-10-12 PROCEDURE — 94669 MECHANICAL CHEST WALL OSCILL: CPT

## 2023-10-12 PROCEDURE — A9270 NON-COVERED ITEM OR SERVICE: HCPCS | Performed by: STUDENT IN AN ORGANIZED HEALTH CARE EDUCATION/TRAINING PROGRAM

## 2023-10-12 PROCEDURE — 700102 HCHG RX REV CODE 250 W/ 637 OVERRIDE(OP): Performed by: STUDENT IN AN ORGANIZED HEALTH CARE EDUCATION/TRAINING PROGRAM

## 2023-10-12 PROCEDURE — 99232 SBSQ HOSP IP/OBS MODERATE 35: CPT | Performed by: STUDENT IN AN ORGANIZED HEALTH CARE EDUCATION/TRAINING PROGRAM

## 2023-10-12 PROCEDURE — 83880 ASSAY OF NATRIURETIC PEPTIDE: CPT

## 2023-10-12 PROCEDURE — 84145 PROCALCITONIN (PCT): CPT

## 2023-10-12 PROCEDURE — 700111 HCHG RX REV CODE 636 W/ 250 OVERRIDE (IP): Mod: JZ | Performed by: STUDENT IN AN ORGANIZED HEALTH CARE EDUCATION/TRAINING PROGRAM

## 2023-10-12 PROCEDURE — A9270 NON-COVERED ITEM OR SERVICE: HCPCS | Performed by: INTERNAL MEDICINE

## 2023-10-12 PROCEDURE — 86140 C-REACTIVE PROTEIN: CPT

## 2023-10-12 PROCEDURE — 85027 COMPLETE CBC AUTOMATED: CPT

## 2023-10-12 PROCEDURE — 770006 HCHG ROOM/CARE - MED/SURG/GYN SEMI*

## 2023-10-12 PROCEDURE — 94760 N-INVAS EAR/PLS OXIMETRY 1: CPT

## 2023-10-12 PROCEDURE — 700105 HCHG RX REV CODE 258: Performed by: STUDENT IN AN ORGANIZED HEALTH CARE EDUCATION/TRAINING PROGRAM

## 2023-10-12 PROCEDURE — 82962 GLUCOSE BLOOD TEST: CPT

## 2023-10-12 PROCEDURE — 83735 ASSAY OF MAGNESIUM: CPT

## 2023-10-12 PROCEDURE — 84100 ASSAY OF PHOSPHORUS: CPT

## 2023-10-12 PROCEDURE — 80048 BASIC METABOLIC PNL TOTAL CA: CPT

## 2023-10-12 PROCEDURE — 36415 COLL VENOUS BLD VENIPUNCTURE: CPT

## 2023-10-12 PROCEDURE — 700102 HCHG RX REV CODE 250 W/ 637 OVERRIDE(OP): Performed by: INTERNAL MEDICINE

## 2023-10-12 RX ORDER — HALOPERIDOL 5 MG/ML
2.5 INJECTION INTRAMUSCULAR ONCE
Status: DISPENSED | OUTPATIENT
Start: 2023-10-12 | End: 2023-10-13

## 2023-10-12 RX ADMIN — DONEPEZIL HYDROCHLORIDE 10 MG: 5 TABLET, FILM COATED ORAL at 18:18

## 2023-10-12 RX ADMIN — OMEPRAZOLE 20 MG: 20 CAPSULE, DELAYED RELEASE ORAL at 05:48

## 2023-10-12 RX ADMIN — GUAIFENESIN 200 MG: 100 SOLUTION ORAL at 02:58

## 2023-10-12 RX ADMIN — AMPICILLIN AND SULBACTAM 3 G: 1; 2 INJECTION, POWDER, FOR SOLUTION INTRAMUSCULAR; INTRAVENOUS at 15:23

## 2023-10-12 RX ADMIN — QUETIAPINE FUMARATE 50 MG: 25 TABLET ORAL at 05:48

## 2023-10-12 RX ADMIN — HEPARIN SODIUM 5000 UNITS: 5000 INJECTION, SOLUTION INTRAVENOUS; SUBCUTANEOUS at 05:48

## 2023-10-12 RX ADMIN — DIBASIC SODIUM PHOSPHATE, MONOBASIC POTASSIUM PHOSPHATE AND MONOBASIC SODIUM PHOSPHATE 500 MG: 852; 155; 130 TABLET ORAL at 18:17

## 2023-10-12 RX ADMIN — ASPIRIN 81 MG: 81 TABLET, COATED ORAL at 05:48

## 2023-10-12 RX ADMIN — DOCUSATE SODIUM 50 MG AND SENNOSIDES 8.6 MG 2 TABLET: 8.6; 5 TABLET, FILM COATED ORAL at 18:18

## 2023-10-12 RX ADMIN — HEPARIN SODIUM 5000 UNITS: 5000 INJECTION, SOLUTION INTRAVENOUS; SUBCUTANEOUS at 21:26

## 2023-10-12 RX ADMIN — AMPICILLIN AND SULBACTAM 3 G: 1; 2 INJECTION, POWDER, FOR SOLUTION INTRAMUSCULAR; INTRAVENOUS at 02:34

## 2023-10-12 RX ADMIN — HEPARIN SODIUM 5000 UNITS: 5000 INJECTION, SOLUTION INTRAVENOUS; SUBCUTANEOUS at 15:18

## 2023-10-12 RX ADMIN — THIAMINE HYDROCHLORIDE 100 MG: 100 INJECTION, SOLUTION INTRAMUSCULAR; INTRAVENOUS at 05:58

## 2023-10-12 RX ADMIN — DOCUSATE SODIUM 50 MG AND SENNOSIDES 8.6 MG 2 TABLET: 8.6; 5 TABLET, FILM COATED ORAL at 05:49

## 2023-10-12 RX ADMIN — AMLODIPINE BESYLATE 5 MG: 5 TABLET ORAL at 05:48

## 2023-10-12 RX ADMIN — AMPICILLIN AND SULBACTAM 3 G: 1; 2 INJECTION, POWDER, FOR SOLUTION INTRAMUSCULAR; INTRAVENOUS at 09:38

## 2023-10-12 RX ADMIN — QUETIAPINE FUMARATE 50 MG: 25 TABLET ORAL at 18:16

## 2023-10-12 RX ADMIN — GUAIFENESIN 200 MG: 100 SOLUTION ORAL at 22:50

## 2023-10-12 RX ADMIN — ATORVASTATIN CALCIUM 20 MG: 20 TABLET, FILM COATED ORAL at 21:16

## 2023-10-12 RX ADMIN — OMEPRAZOLE 20 MG: 20 CAPSULE, DELAYED RELEASE ORAL at 18:19

## 2023-10-12 RX ADMIN — INSULIN HUMAN 1 UNITS: 100 INJECTION, SOLUTION PARENTERAL at 21:14

## 2023-10-12 RX ADMIN — LEVOTHYROXINE SODIUM 25 MCG: 0.03 TABLET ORAL at 05:48

## 2023-10-12 RX ADMIN — Medication 5 MG: at 21:16

## 2023-10-12 RX ADMIN — GUAIFENESIN 600 MG: 600 TABLET, EXTENDED RELEASE ORAL at 18:17

## 2023-10-12 RX ADMIN — HALOPERIDOL LACTATE 1 MG: 5 INJECTION, SOLUTION INTRAMUSCULAR at 13:10

## 2023-10-12 RX ADMIN — GUAIFENESIN 600 MG: 600 TABLET, EXTENDED RELEASE ORAL at 05:48

## 2023-10-12 ASSESSMENT — ENCOUNTER SYMPTOMS
CHILLS: 0
COUGH: 0
WEIGHT LOSS: 0
NAUSEA: 0
NECK PAIN: 0
PALPITATIONS: 0
ABDOMINAL PAIN: 0
MYALGIAS: 0
CONSTIPATION: 0
BLURRED VISION: 0
HEMOPTYSIS: 0
FEVER: 0
WEAKNESS: 0
CLAUDICATION: 0
DIARRHEA: 0
ORTHOPNEA: 0
VOMITING: 0
PHOTOPHOBIA: 0
DOUBLE VISION: 0
SPEECH CHANGE: 0
DIZZINESS: 0

## 2023-10-12 ASSESSMENT — PAIN DESCRIPTION - PAIN TYPE: TYPE: ACUTE PAIN

## 2023-10-12 NOTE — CARE PLAN
Problem: Hyperinflation  Goal: Prevent or improve atelectasis  Description: Target End Date:  3 to 4 days    1. Instruct incentive spirometry usage  2.  Perform hyperinflation therapy as indicated  Outcome: Progressing        Respiratory Update  Treatment Modality: PEP  Frequency: BID    Pt tolerating current treatments well with no adverse reactions, will continue to monitor

## 2023-10-12 NOTE — PROGRESS NOTES
Hospital Medicine Daily Progress Note    Date of Service  10/12/2023    Chief Complaint  Katharine Gee is a 82 y.o. female admitted 10/9/2023 with shortness of breath    Hospital Course  82-year-old female with history of moderate to advanced dementia, history of hypertension, and hypothyroidism who presented 10/9 with shortness of breath.  Patient has dementia not able to provide a good history however patient was found to have hypoxia with shortness of breath, patient lives with her daughters who are the caregiver.  On admission patient was found to have leukocytosis with elevated lactic acid, chest x-ray showed right-sided pneumonia, CT scan did not show PE however showed signs of pneumonia.  IV antibiotics Unasyn with the fluid were started.  Speech evaluated the patient.    The goal of care was discussed in detail with the patient's daughter, agreed for DNR/DNI, continue following.    Interval Problem Update  No acute overnight events  Sitting in the chair, on 3L O2 this am, continue weaning O2  Walking O2  PT/OT rec HHC, ordered   Continue iv Unasyn  Phos 2.4, ordered kphos  I have reviewed Echo and doppler    I have discussed this patient's plan of care and discharge plan at IDT rounds today with Case Management, Nursing, Nursing leadership, and other members of the IDT team.    Consultants/Specialty  None      Code Status  DNAR/DNI    Disposition  The patient is not medically cleared for discharge to home or a post-acute facility.      I have placed the appropriate orders for post-discharge needs.    Review of Systems  Review of Systems   Unable to perform ROS: Dementia   Constitutional:  Negative for chills, fever and weight loss.   HENT:  Negative for ear pain, hearing loss and tinnitus.    Eyes:  Negative for blurred vision, double vision and photophobia.   Respiratory:  Negative for cough and hemoptysis.    Cardiovascular:  Negative for chest pain, palpitations, orthopnea and claudication.    Gastrointestinal:  Negative for abdominal pain, constipation, diarrhea, nausea and vomiting.   Genitourinary:  Negative for dysuria, frequency and urgency.   Musculoskeletal:  Negative for myalgias and neck pain.   Skin:  Negative for rash.   Neurological:  Negative for dizziness, speech change and weakness.        Physical Exam  Temp:  [36.1 °C (97 °F)-37.1 °C (98.7 °F)] 36.7 °C (98.1 °F)  Pulse:  [] 86  Resp:  [18-20] 20  BP: (123-149)/(49-73) 123/73  SpO2:  [83 %-98 %] 91 %    Physical Exam  Constitutional:       General: She is not in acute distress.     Appearance: She is not ill-appearing.   Cardiovascular:      Rate and Rhythm: Normal rate.      Heart sounds: No murmur heard.  Pulmonary:      Effort: Respiratory distress present.      Breath sounds: Rales present.   Abdominal:      General: There is no distension.      Palpations: Abdomen is soft.      Tenderness: There is no abdominal tenderness.   Musculoskeletal:         General: No deformity.      Right lower leg: No edema.      Left lower leg: No edema.   Skin:     Coloration: Skin is not jaundiced or pale.      Findings: No bruising, lesion or rash.   Neurological:      General: No focal deficit present.      Mental Status: She is oriented to person, place, and time. Mental status is at baseline.      Cranial Nerves: No cranial nerve deficit.      Motor: No weakness.         Fluids    Intake/Output Summary (Last 24 hours) at 10/12/2023 1406  Last data filed at 10/12/2023 0530  Gross per 24 hour   Intake 220 ml   Output --   Net 220 ml         Laboratory  Recent Labs     10/10/23  0044 10/11/23  0641 10/12/23  1122   WBC 15.6* 8.3 7.8   RBC 3.99* 4.00* 4.52   HEMOGLOBIN 11.4* 11.2* 12.6   HEMATOCRIT 35.5* 36.3* 42.2   MCV 89.0 90.8 93.4   MCH 28.6 28.0 27.9   MCHC 32.1* 30.9* 29.9*   RDW 52.2* 53.8* 55.3*   PLATELETCT 201 197 246   MPV 10.0 10.2 10.3       Recent Labs     10/10/23  0044 10/11/23  0641 10/12/23  1122   SODIUM 140 139 140    POTASSIUM 3.6 3.9 4.0   CHLORIDE 105 108 106   CO2 25 22 23   GLUCOSE 118* 120* 99   BUN 36* 21 13   CREATININE 1.17 0.67 0.78   CALCIUM 9.1 8.9 9.1       Recent Labs     10/10/23  0711   INR 1.30*                 Imaging  US-EXTREMITY VENOUS LOWER BILAT   Final Result      EC-ECHOCARDIOGRAM COMPLETE W/O CONT   Final Result      DX-ESOPHAGUS - [id number]   Final Result      CT-CTA CHEST PULMONARY ARTERY W/ RECONS   Final Result         1. No pulmonary embolus.   2. Patchy bilateral infiltrates, consistent with pneumonia. Recommend follow-up to ensure resolution.   3. Mild mediastinal and hilar adenopathy, likely reactive. Consider follow-up chest CT in 3 months to ensure resolution.   4. Small, 2 similar sliding hiatal hernia.   5. Simple liver cysts, which do not require follow-up.         DX-CHEST-PORTABLE (1 VIEW)   Final Result      Ill-defined opacities in the right mid and lower lungs. They may represent activity pneumonia.             Assessment/Plan  * Aspiration pneumonia (HCC)  Assessment & Plan  Came with leukocytosis and infiltrates on the chest x-ray  Patient is on high risk for aspiration due to advanced dementia  Speech on board, appreciate the recommendations  Antibiotic: Unasyn, azithromycin  Follow cultures  Discussed the risk of recurrent aspiration pneumonia with family.  Speech evaluation    Sepsis (HCC)  Assessment & Plan  This is Sepsis Present on admission  SIRS criteria identified on my evaluation include: Tachycardia, with heart rate greater than 90 BPM, Tachypnea, with respirations greater than 20 per minute, Leukocytosis, with WBC greater than 12,000 and Bandemia, greater than 10% bands  Clinical indicators of end organ dysfunction include Lactic Acid greater than 2, Acute Renal Failure, with a finding of creatinine greater than 2 (patient does not have ESRD or CKD and Acute Renal Failure, with urine output less than 0.5 ml/kg/hr for 2 consecutive hours  Source is pulm  Sepsis  protocol initiated  Crystalloid Fluid Administration: Fluid resuscitation ordered per standard protocol - 30 mL/kg per current or ideal body weight  IV antibiotics as appropriate for source of sepsis  Reassessment: I have reassessed the patient's hemodynamic status  Resolved    Acute hypoxemic respiratory failure (HCC)- (present on admission)  Assessment & Plan  Likely secondary to pneumonia  CTA did not show PE  Continue Unasyn  Continue weaning her from oxygen  Echo notes Normal left ventricular size and systolic function.  Walking O2    Dementia (HCC)  Assessment & Plan  Continue home Aricept, Seroquel  As needed Haldol  Minimize sedative  Frequent reorientation    Dyslipidemia- (present on admission)  Assessment & Plan  Continue statin    CKD (chronic kidney disease) stage 3, GFR 30-59 ml/min ([facility])- (present on admission)  Assessment & Plan  Minimize nephrotoxic agents, renally dose meds  Her creatinine around baseline 1.1-1.4    Class 2 obesity in adult  Assessment & Plan  Diet and lifestyle modification  Body mass index is 36.05 kg/m².      ACP (advance care planning)  Assessment & Plan  Goal of care discussed with patient and patient's 2 daughters in ER.  Patient and family agreeable for noninvasive, as well as invasive/heroic life-sustaining measures-including CPR/defibrillation/intubation mechanical ventilation as needed.  Diagnosis, prognosis, questions concern addressed.  Full CODE STATUS confirmed.  ACP: 16 minutes    On 10/10 the goal of care was discussed in detail with her daughters, discussed the CODE STATUS, discussed the prognosis of her dementia, discussed the risk of recurrent aspiration, daughters agreed for DNR/DNI.  Time 25-minute    Acquired hypothyroidism- (present on admission)  Assessment & Plan  Synthroid 25 mcg daily  TSH normal    HTN (hypertension)- (present on admission)  Assessment & Plan  Continue amlodipine 5 mg daily         VTE prophylaxis:    heparin ppx      I have performed a  physical exam and reviewed and updated ROS and Plan today (10/12/2023). In review of yesterday's note (10/11/2023), there are no changes except as documented above.

## 2023-10-12 NOTE — CARE PLAN
Problem: Hyperinflation  Goal: Prevent or improve atelectasis  Description: Target End Date:  3 to 4 days    1. Instruct incentive spirometry usage  2.  Perform hyperinflation therapy as indicated  Outcome: Progressing          Respiratory Update  Treatment Modality: PEP   Frequency: QID    Pt tolerating current treatments well with no adverse reactions, will continue to monitor

## 2023-10-12 NOTE — THERAPY
Physical Therapy   Initial Evaluation     Patient Name: Katharine Gee  Age:  82 y.o., Sex:  female  Medical Record #: [id number]  Today's Date: 10/12/2023     Precautions  Precautions: Fall Risk    Assessment  Pt with impaired activity tolerance associated with chief complaint of SOB found to have possible aspiration PNA in setting of hernia and dementia. Daughter at bedside providing all social history, between her and her sister they provide 24/7 support. They have never had home health and may be beneficial to establish a daily mobility routine to maintain strength as they do endorse she has been less and less engaged with outside/mobility as her dementia has progressed. She was able to mobilize at stand by assist with/without FWW which is near her baseline per daughter. Will follow, recommend home health PT when medically appropriate for dc.     Plan    Physical Therapy Initial Treatment Plan   Treatment Plan : Bed Mobility, Equipment, Manual Therapy, Gait Training, Neuro Re-Education / Balance, Self Care / Home Evaluation, Stair Training, Therapeutic Activities, Therapeutic Exercise  Treatment Frequency: 2 Times per Week  Duration: Until Therapy Goals Met    DC Equipment Recommendations: none   Discharge Recommendations: home health        Abridged Subjective/Objective     10/11/23 1535   Prior Living Situation   Prior Services Intermittent Physical Support for ADL Per Family   Housing / Facility 1 Story House   Steps Into Home 1   Bathroom Set up Walk In Shower;Bathtub / Shower Combination;Shower Chair;Grab Bars   Equipment Owned Grab Bar(s) In Tub / Shower;Tub / Shower Seat   Lives with - Patient's Self Care Capacity Adult Children   Comments dtr present and reporting social history; both daughters provide 24/7; denies falls   Prior Level of Functional Mobility   Bed Mobility Independent   Transfer Status Independent   Ambulation Independent   Ambulation Distance to tolerance   Assistive Devices  Used None   Stairs Independent   Cognition    Cognition / Consciousness X   Speech/ Communication Delayed Responses   Level of Consciousness Alert   New Learning Impaired   Comments family present and reporting dementia but did not go into detail   Passive ROM Lower Body   Passive ROM Lower Body WDL   Strength Lower Body   Lower Body Strength  X   Gross Strength Generalized Weakness, Equal Bilaterally   Sensation Lower Body   Lower Extremity Sensation   Not Tested   Balance Assessment   Sitting Balance (Static) Good   Sitting Balance (Dynamic) Fair +   Standing Balance (Static) Fair   Standing Balance (Dynamic) Fair -   Weight Shift Sitting Good   Weight Shift Standing Fair   Comments B UE support in sittings/tanding; no loss of balance in moving environment   Bed Mobility    Supine to Sit   (NT, in chair pre/post)   Sit to Supine   (has recliner option)   Gait Analysis   Gait Level Of Assist Standby Assist   Assistive Device Front Wheel Walker   Distance (Feet) 200   # of Times Distance was Traveled 1   Deviation Decreased Base Of Support   Weight Bearing Status wbat   Vision Deficits Impacting Mobility none noted   Comments distance limited by therapist,   Functional Mobility   Sit to Stand Standby Assist   Bed, Chair, Wheelchair Transfer Standby Assist   Patient / Family Goals    Patient / Family Goal #1 to improve strength   Short Term Goals    Short Term Goal # 1 Pt will perform supine<>sit from flat HOB/no railing with supervision within 6 visits to ensure independent mobility at home.   Short Term Goal # 2 Pt will ascend/descnd 1 step with bialteral UE support and contact guard assist within 6 visits to ensure independent mobility at home.   Education Group   Role of Physical Therapist Patient Response Patient;Acceptance;Explanation;Demonstration;Action Demonstration;Verbal Demonstration   Use of Assistive Device Patient Response Patient;Acceptance;Demonstration;Explanation;Verbal Demonstration;Action  Demonstration

## 2023-10-12 NOTE — CARE PLAN
The patient is Stable - Low risk of patient condition declining or worsening    Shift Goals  Clinical Goals: Patient will not show any signs of shortness of breath during this shift  Patient Goals: rest and sleep  Family Goals: bobbi    Progress made toward(s) clinical / shift goals:  Patient had a cough during the shift. PRN medication given. No complains of shortness of breath. Patient on 3 L of oxygen. Patient's O2 sat  remained above 90% during the shift.     Problem: Respiratory  Goal: Patient will achieve/maintain optimum respiratory ventilation and gas exchange  Outcome: Progressing     Problem: Fall Risk  Goal: Patient will remain free from falls  Outcome: Progressing     Problem: Safety - Medical Restraint  Goal: Remains free of injury from restraints (Restraint for Interference with Medical Device)  Outcome: Progressing       Patient is not progressing towards the following goals:

## 2023-10-12 NOTE — THERAPY
"Occupational Therapy   Initial Evaluation     Patient Name: Katharine Gee  Age:  82 y.o., Sex:  female  Medical Record #: [id number]  Today's Date: 10/11/2023       Precautions: Fall Risk    Assessment  Patient is 82 y.o. female with a history of moderate to advanced dementia, history of hypertension, and hypothyroidism who presented 10/9 with shortness of breath. Pt found to have pneumonia.  Pt seen today with her daughter present.  Pt sitting up in chair on arrival on RA.  Pt's daughter provided hx as pt has dementia.  Family is always with pt 24/7 as needed.  Today pt was able to perform basic ADL's & functional mobility with SBA & cues for sequencing & initiation.  Recommended family obtain a shower chair for home use.  Pt may also benefit from HH therapy to make any additional modification in the home environment & ensure a safe transition to home.  Given pt's dementia & good family support she will likely do best in an environment she is most familiar with.    Plan    Occupational Therapy Initial Treatment Plan   Duration: Discharge Needs Only    DC Equipment Recommendations: Tub / Shower Seat  Discharge Recommendations: Recommend home health for continued occupational therapy services     Subjective    \"I'm pretty comfortable right here\"     Objective      Initial Contact Note    Initial Contact Note Order Received and Verified, Evaluation Only - Patient Does Not Require Further Acute Occupational Therapy at this Time.  However, May Benefit from Post Acute Therapy for Higher Level Functional Deficits.   Prior Living Situation   Prior Services Intermittent Physical Support for ADL Per Family   Housing / Facility 1 Story House   Steps Into Home 1   Bathroom Set up Walk In Shower;Bathtub / Shower Combination;Shower Chair;Grab Bars   Equipment Owned Tub / Shower Seat;Grab Bar(s) In Tub / Shower   Lives with - Patient's Self Care Capacity Adult Children   Comments Daughter present & provided hx.  Pt " always have 24/7 supervision from family at home.  Pt normally is able to complete basic ADL's with supervision   Prior Level of ADL Function   Self Feeding Independent   Grooming / Hygiene Independent   Bathing Requires Assist   Dressing Independent   Toileting Independent   Prior Level of IADL Function   Medication Management Requires Assist   Laundry Requires Assist   Kitchen Mobility Requires Assist   Finances Requires Assist   Home Management Requires Assist   Shopping Requires Assist   Prior Level Of Mobility Supervision Without Device in Home   Precautions   Precautions Fall Risk   Vitals   O2 Delivery Device None - Room Air   Pain   Pain Scales 0 to 10 Scale    Intervention Ambulation / Increased Activity   Pain 0 - 10 Group   Therapist Pain Assessment During Activity;Nurse Notified;0   Cognition    Cognition / Consciousness X   Speech/ Communication Delayed Responses   Level of Consciousness Alert   New Learning Impaired   Comments Family reports pt has hx of Dementia & is forgetful   Passive ROM Upper Body   Passive ROM Upper Body WDL   Active ROM Upper Body   Active ROM Upper Body  WDL   Strength Upper Body   Upper Body Strength  WDL   Coordination Upper Body   Coordination WDL   Balance Assessment   Sitting Balance (Static) Fair +   Sitting Balance (Dynamic) Fair   Standing Balance (Static) Fair   Standing Balance (Dynamic) Fair   Weight Shift Sitting Good   Weight Shift Standing Good   Bed Mobility    Comments up in chair pre & post tx   ADL Assessment   Eating Supervision   Grooming Supervision;Seated   Upper Body Dressing Supervision   Lower Body Dressing Minimal Assist   Toileting Contact Guard Assist   Functional Mobility   Sit to Stand Standby Assist   Bed, Chair, Wheelchair Transfer Standby Assist   Transfer Method Stand Step   Mobility pt able to amb with FWW & SBA   Activity Tolerance   Sitting in Chair 45   Standing 8   Education Group   Education Provided Activities of Daily Living;Home  Safety;Adaptive Equipment   Role of Occupational Therapist Patient Response Patient;Family;Acceptance;Explanation;Verbal Demonstration   Home Safety Patient Response Patient;Acceptance;Explanation;Demonstration;Family;Verbal Demonstration   ADL Patient Response Patient;Acceptance;Explanation;Demonstration;Verbal Demonstration;Action Demonstration   Adaptive Equipment Patient Response Patient;Family;Acceptance;Explanation;Verbal Demonstration  (shower chair for home use)   Occupational Therapy Initial Treatment Plan    Duration Discharge Needs Only   Anticipated Discharge Equipment and Recommendations   DC Equipment Recommendations Tub / Shower Seat   Discharge Recommendations Recommend home health for continued occupational therapy services   Interdisciplinary Plan of Care Collaboration   IDT Collaboration with  Nursing;Family / Caregiver   Patient Position at End of Therapy Seated;Chair Alarm On;Call Light within Reach;Tray Table within Reach;Family / Friend in Room   Collaboration Comments daughter present throughout tx   Session Information   Date / Session Number  10/11- D/C needs only

## 2023-10-12 NOTE — PROGRESS NOTES
Assumed care of pt after receiving report from  day shift RN. Pt is A&Ox 2 . Patient disoriented to situation ad place. Pt denies ay pain. Bed is locked and in lowest position, call light within reach, fall precautions in place. All needs met at this time.      Patient educated on the use of call light.     New peripheral IV placed via ultrasound.   30 minutes after Patient had removed the IV.   New IV placed.

## 2023-10-13 PROBLEM — E83.39 HYPOPHOSPHATEMIA: Status: ACTIVE | Noted: 2023-10-13

## 2023-10-13 LAB
CRP SERPL HS-MCNC: 13.46 MG/DL (ref 0–0.75)
GLUCOSE BLD STRIP.AUTO-MCNC: 102 MG/DL (ref 65–99)
GLUCOSE BLD STRIP.AUTO-MCNC: 119 MG/DL (ref 65–99)
GLUCOSE BLD STRIP.AUTO-MCNC: 167 MG/DL (ref 65–99)
GLUCOSE BLD STRIP.AUTO-MCNC: 80 MG/DL (ref 65–99)
PROCALCITONIN SERPL-MCNC: 0.49 NG/ML

## 2023-10-13 PROCEDURE — 99232 SBSQ HOSP IP/OBS MODERATE 35: CPT | Performed by: STUDENT IN AN ORGANIZED HEALTH CARE EDUCATION/TRAINING PROGRAM

## 2023-10-13 PROCEDURE — 700105 HCHG RX REV CODE 258: Performed by: STUDENT IN AN ORGANIZED HEALTH CARE EDUCATION/TRAINING PROGRAM

## 2023-10-13 PROCEDURE — 700111 HCHG RX REV CODE 636 W/ 250 OVERRIDE (IP): Performed by: STUDENT IN AN ORGANIZED HEALTH CARE EDUCATION/TRAINING PROGRAM

## 2023-10-13 PROCEDURE — A9270 NON-COVERED ITEM OR SERVICE: HCPCS | Performed by: STUDENT IN AN ORGANIZED HEALTH CARE EDUCATION/TRAINING PROGRAM

## 2023-10-13 PROCEDURE — 84145 PROCALCITONIN (PCT): CPT

## 2023-10-13 PROCEDURE — 700102 HCHG RX REV CODE 250 W/ 637 OVERRIDE(OP): Performed by: INTERNAL MEDICINE

## 2023-10-13 PROCEDURE — 94640 AIRWAY INHALATION TREATMENT: CPT

## 2023-10-13 PROCEDURE — 36415 COLL VENOUS BLD VENIPUNCTURE: CPT

## 2023-10-13 PROCEDURE — 700102 HCHG RX REV CODE 250 W/ 637 OVERRIDE(OP): Performed by: STUDENT IN AN ORGANIZED HEALTH CARE EDUCATION/TRAINING PROGRAM

## 2023-10-13 PROCEDURE — 770006 HCHG ROOM/CARE - MED/SURG/GYN SEMI*

## 2023-10-13 PROCEDURE — A9270 NON-COVERED ITEM OR SERVICE: HCPCS | Performed by: INTERNAL MEDICINE

## 2023-10-13 PROCEDURE — 700101 HCHG RX REV CODE 250: Performed by: STUDENT IN AN ORGANIZED HEALTH CARE EDUCATION/TRAINING PROGRAM

## 2023-10-13 PROCEDURE — 82962 GLUCOSE BLOOD TEST: CPT | Mod: 91

## 2023-10-13 PROCEDURE — 86140 C-REACTIVE PROTEIN: CPT

## 2023-10-13 PROCEDURE — 700111 HCHG RX REV CODE 636 W/ 250 OVERRIDE (IP): Mod: JZ | Performed by: STUDENT IN AN ORGANIZED HEALTH CARE EDUCATION/TRAINING PROGRAM

## 2023-10-13 RX ORDER — FUROSEMIDE 10 MG/ML
40 INJECTION INTRAMUSCULAR; INTRAVENOUS
Status: DISCONTINUED | OUTPATIENT
Start: 2023-10-13 | End: 2023-10-14

## 2023-10-13 RX ADMIN — AMPICILLIN AND SULBACTAM 3 G: 1; 2 INJECTION, POWDER, FOR SOLUTION INTRAMUSCULAR; INTRAVENOUS at 21:22

## 2023-10-13 RX ADMIN — AMPICILLIN AND SULBACTAM 3 G: 1; 2 INJECTION, POWDER, FOR SOLUTION INTRAMUSCULAR; INTRAVENOUS at 00:55

## 2023-10-13 RX ADMIN — GUAIFENESIN 600 MG: 600 TABLET, EXTENDED RELEASE ORAL at 17:35

## 2023-10-13 RX ADMIN — IPRATROPIUM BROMIDE AND ALBUTEROL SULFATE 3 ML: 2.5; .5 SOLUTION RESPIRATORY (INHALATION) at 01:42

## 2023-10-13 RX ADMIN — DIBASIC SODIUM PHOSPHATE, MONOBASIC POTASSIUM PHOSPHATE AND MONOBASIC SODIUM PHOSPHATE 500 MG: 852; 155; 130 TABLET ORAL at 17:35

## 2023-10-13 RX ADMIN — OMEPRAZOLE 20 MG: 20 CAPSULE, DELAYED RELEASE ORAL at 17:35

## 2023-10-13 RX ADMIN — DONEPEZIL HYDROCHLORIDE 10 MG: 5 TABLET, FILM COATED ORAL at 17:35

## 2023-10-13 RX ADMIN — INSULIN HUMAN 1 UNITS: 100 INJECTION, SOLUTION PARENTERAL at 19:38

## 2023-10-13 RX ADMIN — QUETIAPINE FUMARATE 50 MG: 25 TABLET ORAL at 17:35

## 2023-10-13 RX ADMIN — Medication 5 MG: at 21:20

## 2023-10-13 RX ADMIN — AMPICILLIN AND SULBACTAM 3 G: 1; 2 INJECTION, POWDER, FOR SOLUTION INTRAMUSCULAR; INTRAVENOUS at 14:20

## 2023-10-13 RX ADMIN — ATORVASTATIN CALCIUM 20 MG: 20 TABLET, FILM COATED ORAL at 21:20

## 2023-10-13 RX ADMIN — AMPICILLIN AND SULBACTAM 3 G: 1; 2 INJECTION, POWDER, FOR SOLUTION INTRAMUSCULAR; INTRAVENOUS at 08:17

## 2023-10-13 RX ADMIN — HEPARIN SODIUM 5000 UNITS: 5000 INJECTION, SOLUTION INTRAVENOUS; SUBCUTANEOUS at 21:20

## 2023-10-13 RX ADMIN — HEPARIN SODIUM 5000 UNITS: 5000 INJECTION, SOLUTION INTRAVENOUS; SUBCUTANEOUS at 14:21

## 2023-10-13 RX ADMIN — FUROSEMIDE 40 MG: 10 INJECTION, SOLUTION INTRAMUSCULAR; INTRAVENOUS at 08:14

## 2023-10-13 ASSESSMENT — ENCOUNTER SYMPTOMS
FEVER: 0
CONSTIPATION: 0
ABDOMINAL PAIN: 0
PHOTOPHOBIA: 0
CHILLS: 0
WEAKNESS: 0
PALPITATIONS: 0
BLURRED VISION: 0
CLAUDICATION: 0
DIZZINESS: 0
SPEECH CHANGE: 0
COUGH: 0
ORTHOPNEA: 0
NAUSEA: 0
HEMOPTYSIS: 0
DIARRHEA: 0
NECK PAIN: 0
DOUBLE VISION: 0
MYALGIAS: 0
VOMITING: 0
WEIGHT LOSS: 0

## 2023-10-13 ASSESSMENT — PAIN DESCRIPTION - PAIN TYPE
TYPE: ACUTE PAIN
TYPE: ACUTE PAIN

## 2023-10-13 NOTE — CARE PLAN
The patient is Stable - Low risk of patient condition declining or worsening    Shift Goals  Clinical Goals: patient's agitation will decrease during this shift  Patient Goals: rest  Family Goals: bobbi    Progress made toward(s) clinical / shift goals:    Patient was agitated ad confused during the shift. Patient was in a wheelchair close to the nurses station and kept trying to get up. On call hospitalist contacted to request a IM order of Haldol as patient had no IV access due to her removing two previous IVs in the morning shift. Order was inputted as IV and this RN was not able to give it due to no IV assess at that time. Patient reoriented throughout shift. Once IV assess was obtained, No PRN needed as patient was more calm. Patient helped back to bed. Patient  stayed in bed and slept for the rest of the night.    Patient tried to get out of bed, patient was easily redirectable and went back to sleep.    Patient is not progressing towards the following goals:      Problem: Respiratory  Goal: Patient will achieve/maintain optimum respiratory ventilation and gas exchange  Outcome: Not Progressing   Patient still needs 3 L of oxygen. Patient sounded wheezy and had a cough that was not relieved by Guaifenesin, RT contacted and patient received breathing treatment.

## 2023-10-13 NOTE — DISCHARGE PLANNING
ATTN: Case Management  RE: Referral for Home Health    As of 10/13/2023, we have accepted the Home Health referral for the patient listed above.    A [facility] clinician will be out to see the patient within 48 hours. If you have any questions or concerns regarding the patient's transition to Home Health, please do not hesitate to contact us at [phone].      We look forward to collaborating with you,  [facility] Home Health Team

## 2023-10-13 NOTE — DISCHARGE PLANNING
SC[facility] TCN chart review completed. Collaborated with REYNALDO Haro.  Current discharge considerations are home with HH. Note current 6 click socres of 21 for ADLs and 15 for mobility. Per chart review, patient currently on IV ABX. She is also now on room air.    PT/OT evals completed with recommendation for HH.    SLP MBSS completed with recommendation for post acute placement.    MD placed HH order. Per CM, referral sent [facility]. TCN will continue to follow and collaborate with discharge planning team as additional post acute needs arise. Thank you.    Completed:  PT/OT with recommendations for HH on 10/11  SLP with recs for post acute placement  Choice obtained: HH ([facility] ); DME ([facility])  [facility] referral sent 10/10/23

## 2023-10-13 NOTE — DISCHARGE PLANNING
SCP TCN chart review completed. Collaborated with REYNALDO Gregory. Current discharge considerations are home with HH. Note [facility] accepted. Per chart review, patient currently on 3L O2. MD placed DME([facility]) today and referral sent to [facility].     TCN will continue to follow and collaborate with discharge planning team as additional post acute needs arise. Thank you.    PT/OT evals completed with recommendation for HH.     SLP MBSS completed with recommendation for post acute placement.    Completed:  PT/OT with recommendations for HH on 10/11  SLP with recs for post acute placement  Choice obtained: HH ([facility]); DME ([facility])  [facility] referral sent 10/10/23

## 2023-10-13 NOTE — CARE PLAN
Problem: Urinary - Renal Perfusion  Goal: Ability to achieve and maintain adequate renal perfusion and functioning will improve  Outcome: Progressing     Problem: Respiratory  Goal: Patient will achieve/maintain optimum respiratory ventilation and gas exchange  Outcome: Progressing     Problem: Mechanical Ventilation  Goal: Safe management of artificial airway and ventilation  Outcome: Progressing  Goal: Successful weaning off mechanical ventilator, spontaneously maintains adequate gas exchange  Outcome: Progressing  Goal: Patient will be able to express needs and understand communication  Outcome: Progressing     Problem: Physical Regulation  Goal: Diagnostic test results will improve  Outcome: Progressing  Goal: Signs and symptoms of infection will decrease  Outcome: Progressing     Problem: Skin Integrity  Goal: Skin integrity is maintained or improved  Outcome: Progressing     Problem: Fall Risk  Goal: Patient will remain free from falls  Outcome: Progressing     Problem: Safety - Medical Restraint  Goal: Remains free of injury from restraints (Restraint for Interference with Medical Device)  Outcome: Progressing  Goal: Free from restraint(s) (Restraint for Interference with Medical Device)  Outcome: Progressing   The patient is Watcher - Medium risk of patient condition declining or worsening    Shift Goals  Clinical Goals: pt. will not have SOB this shift, IV antbx to infuse, IV site to stay in place  Patient Goals: rest  Family Goals: bobbi    Progress made toward(s) clinical / shift goals:  Pt. Has pulled out 2 IV sites today, 2nd site was taped around her arm, blue sleeve placed over it, but pt. Managed to pull it out, she received most of the IV antbx, reported to Charge RN who will have another US guided IV placed, other RN attempted but unsuccessful.  Pt. Very aggitated this evening, constantly standing, refusing to sit for long, continued to take off O2.  Pt. Took pills without issue.    Patient is not  progressing towards the following goals:

## 2023-10-13 NOTE — PROGRESS NOTES
Received report from day shift nurse. Patient is alert and oriented x2. Patient disoriented to situation and place. Patient sitting in wheelchair at nurses station. Patient continues to try and get up. Lab belt placed in patient. Patient able to take it off. Patient also continues to take off oxygen. Patient educated on the importance of keeping her oxygen on.     Patient does not have any IV as she removed two IV during the previous shift and three Ivs yesterday. On call hospitalist contacted for any IM Haldol.   IV dose placed instead and unable to give to patient. Patient took her night time medication fine. Patient able to stay more calm. Patient helped back to bed.    Awaiting for ultrasound IV to be placed.   Antibiotics started and contacted pharmacy about rescheduling other doses.     Patient refused all her morning medications.    Patient was helped to get cleaned up and she was refusing care. Patient was cleaned and left to rest. Bed is locked and in the lowest position.

## 2023-10-13 NOTE — FACE TO FACE
"Face to Face Note  -  Durable Medical Equipment    oWn Puckett M.D. - NPI: [id number]  I certify that this patient is under my care and that they had a durable medical equipment(DME)face to face encounter by myself that meets the physician DME face-to-face encounter requirements with this patient on:    Date of encounter:   Patient:                    MRN:                       YOB: 2025  Katharine Gee  [id number]  1940     The encounter with the patient was in whole, or in part, for the following medical condition, which is the primary reason for durable medical equipment:  CHF    I certify that, based on my findings, the following durable medical equipment is medically necessary:    Oxygen   HOME O2 Saturation Measurements:(Values must be present for Home Oxygen orders)  Room air sat at rest: 90  Room air sat with amb: 85  With liters of O2: 3, O2 sat at rest with O2: 94  With Liters of O2: 4, O2 sat with amb with O2 : 94  Is the patient mobile?: Yes  If patient feels more short of breath, they can go up to 6 liters per minute and contact healthcare provider.    Supporting Symptoms: The patient requires supplemental oxygen, as the following interventions have been tried with limited or no improvement: \"Bronchodilators and/or steroid inhalers, \"Positive expiratory pressure therapies, \"Ambulation with oximetry, and \"Incentive spirometry.    My Clinical findings support the need for the above equipment due to:  Hypoxia  "

## 2023-10-13 NOTE — PROGRESS NOTES
Pt. Has pulled out 2 IV sites today, 2nd site was taped around her arm, blue sleeve placed over it, but pt. Managed to pull it out, she received most of the IV antbx, reported to Charge RN who will have another US guided IV placed, other RN attempted but unsuccessful.  Pt. Very aggitated this evening, constantly standing, refusing to sit for long, continued to take off O2.  Pt. Took pills without issue.

## 2023-10-13 NOTE — DISCHARGE PLANNING
Referral sent to [facility] 02    1048- [facility] is asking for the 02 order.    1350- O2 order faxed to [facility].

## 2023-10-13 NOTE — CARE PLAN
Problem: Hyperinflation  Goal: Prevent or improve atelectasis  Description: Target End Date:  3 to 4 days    1. Instruct incentive spirometry usage  2.  Perform hyperinflation therapy as indicated  Outcome: Progressing     Flutter BID

## 2023-10-13 NOTE — PROGRESS NOTES
Hospital Medicine Daily Progress Note    Date of Service  10/13/2023    Chief Complaint  Katharine Gee is a 82 y.o. female admitted 10/9/2023 with shortness of breath    Hospital Course  82-year-old female with history of moderate to advanced dementia, history of hypertension, and hypothyroidism who presented 10/9 with shortness of breath.  Patient has dementia not able to provide a good history however patient was found to have hypoxia with shortness of breath, patient lives with her daughters who are the caregiver.  On admission patient was found to have leukocytosis with elevated lactic acid, chest x-ray showed right-sided pneumonia, CT scan did not show PE however showed signs of pneumonia.  IV antibiotics Unasyn with the fluid were started.  Speech evaluated the patient.    The goal of care was discussed in detail with the patient's daughter, agreed for DNR/DNI, continue following.    Interval Problem Update  No acute overnight events  Walking O2 yesterday, requiring 3-4L O2  I ordered iv lasix  Repeat walking O2 today  Ordered O2 4L  I called and spoke to daughter Yolande and advised the treatment plans and questions were all answered.     I have discussed this patient's plan of care and discharge plan at IDT rounds today with Case Management, Nursing, Nursing leadership, and other members of the IDT team.    Consultants/Specialty  None      Code Status  DNAR/DNI    Disposition  The patient is not medically cleared for discharge to home or a post-acute facility.      I have placed the appropriate orders for post-discharge needs.    Review of Systems  Review of Systems   Unable to perform ROS: Dementia   Constitutional:  Negative for chills, fever and weight loss.   HENT:  Negative for ear pain, hearing loss and tinnitus.    Eyes:  Negative for blurred vision, double vision and photophobia.   Respiratory:  Negative for cough and hemoptysis.    Cardiovascular:  Negative for chest pain, palpitations,  orthopnea and claudication.   Gastrointestinal:  Negative for abdominal pain, constipation, diarrhea, nausea and vomiting.   Genitourinary:  Negative for dysuria, frequency and urgency.   Musculoskeletal:  Negative for myalgias and neck pain.   Skin:  Negative for rash.   Neurological:  Negative for dizziness, speech change and weakness.        Physical Exam  Temp:  [36.3 °C (97.4 °F)-37.2 °C (99 °F)] 36.3 °C (97.4 °F)  Pulse:  [75-96] 75  Resp:  [16-18] 18  BP: (105-148)/(55-75) 140/63  SpO2:  [91 %-98 %] 97 %    Physical Exam  Constitutional:       General: She is not in acute distress.     Appearance: She is not ill-appearing.   Cardiovascular:      Rate and Rhythm: Normal rate.      Heart sounds: No murmur heard.  Pulmonary:      Effort: Respiratory distress present.      Breath sounds: Rales present.   Abdominal:      General: There is no distension.      Palpations: Abdomen is soft.      Tenderness: There is no abdominal tenderness.   Musculoskeletal:         General: No deformity.      Right lower leg: No edema.      Left lower leg: No edema.   Skin:     Coloration: Skin is not jaundiced or pale.      Findings: No bruising, lesion or rash.   Neurological:      General: No focal deficit present.      Mental Status: She is oriented to person, place, and time. Mental status is at baseline.      Cranial Nerves: No cranial nerve deficit.      Motor: No weakness.         Fluids    Intake/Output Summary (Last 24 hours) at 10/13/2023 1350  Last data filed at 10/13/2023 1045  Gross per 24 hour   Intake 460 ml   Output --   Net 460 ml         Laboratory  Recent Labs     10/11/23  0641 10/12/23  1122   WBC 8.3 7.8   RBC 4.00* 4.52   HEMOGLOBIN 11.2* 12.6   HEMATOCRIT 36.3* 42.2   MCV 90.8 93.4   MCH 28.0 27.9   MCHC 30.9* 29.9*   RDW 53.8* 55.3*   PLATELETCT 197 246   MPV 10.2 10.3       Recent Labs     10/11/23  0641 10/12/23  1122   SODIUM 139 140   POTASSIUM 3.9 4.0   CHLORIDE 108 106   CO2 22 23   GLUCOSE 120* 99    BUN 21 13   CREATININE 0.67 0.78   CALCIUM 8.9 9.1                       Imaging  US-EXTREMITY VENOUS LOWER BILAT   Final Result      EC-ECHOCARDIOGRAM COMPLETE W/O CONT   Final Result      DX-ESOPHAGUS - [id number]   Final Result      CT-CTA CHEST PULMONARY ARTERY W/ RECONS   Final Result         1. No pulmonary embolus.   2. Patchy bilateral infiltrates, consistent with pneumonia. Recommend follow-up to ensure resolution.   3. Mild mediastinal and hilar adenopathy, likely reactive. Consider follow-up chest CT in 3 months to ensure resolution.   4. Small, 2 similar sliding hiatal hernia.   5. Simple liver cysts, which do not require follow-up.         DX-CHEST-PORTABLE (1 VIEW)   Final Result      Ill-defined opacities in the right mid and lower lungs. They may represent activity pneumonia.             Assessment/Plan  * Aspiration pneumonia (HCC)  Assessment & Plan  Came with leukocytosis and infiltrates on the chest x-ray  Patient is on high risk for aspiration due to advanced dementia  Speech on board, appreciate the recommendations  Antibiotic: Unasyn, azithromycin  Follow cultures  Discussed the risk of recurrent aspiration pneumonia with family.  Speech evaluation    Hypophosphatemia  Assessment & Plan  Supplemented with [facility] [place]    Sepsis (HCC)  Assessment & Plan  This is Sepsis Present on admission  SIRS criteria identified on my evaluation include: Tachycardia, with heart rate greater than 90 BPM, Tachypnea, with respirations greater than 20 per minute, Leukocytosis, with WBC greater than 12,000 and Bandemia, greater than 10% bands  Clinical indicators of end organ dysfunction include Lactic Acid greater than 2, Acute Renal Failure, with a finding of creatinine greater than 2 (patient does not have ESRD or CKD and Acute Renal Failure, with urine output less than 0.5 ml/kg/hr for 2 consecutive hours  Source is pulm  Sepsis protocol initiated  Crystalloid Fluid Administration: Fluid resuscitation ordered  per standard protocol - 30 mL/kg per current or ideal body weight  IV antibiotics as appropriate for source of sepsis  Reassessment: I have reassessed the patient's hemodynamic status  Resolved    Acute hypoxemic respiratory failure (HCC)- (present on admission)  Assessment & Plan  Likely secondary to pneumonia  CTA did not show PE  Echo notes Normal left ventricular size and systolic function.  Noted BNP 1026    10/13: Start iv lasix  Continue Unasyn   Continue weaning oxygen  Walking O2      Dementia (HCC)  Assessment & Plan  Continue home Aricept, Seroquel  As needed Haldol  Minimize sedative  Frequent reorientation    Dyslipidemia- (present on admission)  Assessment & Plan  Continue statin    CKD (chronic kidney disease) stage 3, GFR 30-59 ml/min ([facility])- (present on admission)  Assessment & Plan  Minimize nephrotoxic agents, renally dose meds  Her creatinine around baseline 1.1-1.4    Class 2 obesity in adult  Assessment & Plan  Diet and lifestyle modification  Body mass index is 36.05 kg/m².      ACP (advance care planning)  Assessment & Plan  Goal of care discussed with patient and patient's 2 daughters in ER.  Patient and family agreeable for noninvasive, as well as invasive/heroic life-sustaining measures-including CPR/defibrillation/intubation mechanical ventilation as needed.  Diagnosis, prognosis, questions concern addressed.  Full CODE STATUS confirmed.  ACP: 16 minutes    On 10/10 the goal of care was discussed in detail with her daughters, discussed the CODE STATUS, discussed the prognosis of her dementia, discussed the risk of recurrent aspiration, daughters agreed for DNR/DNI.  Time 25-minute    Acquired hypothyroidism- (present on admission)  Assessment & Plan  Synthroid 25 mcg daily  TSH normal    HTN (hypertension)- (present on admission)  Assessment & Plan  Continue amlodipine 5 mg daily         VTE prophylaxis:    heparin ppx      I have performed a physical exam and reviewed and updated ROS and  Plan today (10/13/2023). In review of yesterday's note (10/12/2023), there are no changes except as documented above.

## 2023-10-14 VITALS
DIASTOLIC BLOOD PRESSURE: 50 MMHG | HEIGHT: 64 IN | BODY MASS INDEX: 35.45 KG/M2 | RESPIRATION RATE: 16 BRPM | OXYGEN SATURATION: 93 % | HEART RATE: 79 BPM | SYSTOLIC BLOOD PRESSURE: 134 MMHG | WEIGHT: 207.67 LBS | TEMPERATURE: 97.1 F

## 2023-10-14 LAB
ANION GAP SERPL CALC-SCNC: 9 MMOL/L (ref 7–16)
BACTERIA BLD CULT: NORMAL
BUN SERPL-MCNC: 15 MG/DL (ref 8–22)
CALCIUM SERPL-MCNC: 9 MG/DL (ref 8.5–10.5)
CHLORIDE SERPL-SCNC: 109 MMOL/L (ref 96–112)
CO2 SERPL-SCNC: 28 MMOL/L (ref 20–33)
CREAT SERPL-MCNC: 0.8 MG/DL (ref 0.5–1.4)
CRP SERPL HS-MCNC: 12.34 MG/DL (ref 0–0.75)
GFR SERPLBLD CREATININE-BSD FMLA CKD-EPI: 72 ML/MIN/1.73 M 2
GLUCOSE BLD STRIP.AUTO-MCNC: 101 MG/DL (ref 65–99)
GLUCOSE BLD STRIP.AUTO-MCNC: 121 MG/DL (ref 65–99)
GLUCOSE SERPL-MCNC: 96 MG/DL (ref 65–99)
MAGNESIUM SERPL-MCNC: 2.1 MG/DL (ref 1.5–2.5)
PHOSPHATE SERPL-MCNC: 3.1 MG/DL (ref 2.5–4.5)
POTASSIUM SERPL-SCNC: 3.5 MMOL/L (ref 3.6–5.5)
PROCALCITONIN SERPL-MCNC: 0.38 NG/ML
SIGNIFICANT IND 70042: NORMAL
SITE SITE: NORMAL
SODIUM SERPL-SCNC: 146 MMOL/L (ref 135–145)
SOURCE SOURCE: NORMAL

## 2023-10-14 PROCEDURE — A9270 NON-COVERED ITEM OR SERVICE: HCPCS | Performed by: STUDENT IN AN ORGANIZED HEALTH CARE EDUCATION/TRAINING PROGRAM

## 2023-10-14 PROCEDURE — 83735 ASSAY OF MAGNESIUM: CPT

## 2023-10-14 PROCEDURE — 700111 HCHG RX REV CODE 636 W/ 250 OVERRIDE (IP): Mod: JZ | Performed by: STUDENT IN AN ORGANIZED HEALTH CARE EDUCATION/TRAINING PROGRAM

## 2023-10-14 PROCEDURE — 84100 ASSAY OF PHOSPHORUS: CPT

## 2023-10-14 PROCEDURE — 86140 C-REACTIVE PROTEIN: CPT

## 2023-10-14 PROCEDURE — 36415 COLL VENOUS BLD VENIPUNCTURE: CPT

## 2023-10-14 PROCEDURE — 700111 HCHG RX REV CODE 636 W/ 250 OVERRIDE (IP): Performed by: STUDENT IN AN ORGANIZED HEALTH CARE EDUCATION/TRAINING PROGRAM

## 2023-10-14 PROCEDURE — 84145 PROCALCITONIN (PCT): CPT

## 2023-10-14 PROCEDURE — 700102 HCHG RX REV CODE 250 W/ 637 OVERRIDE(OP): Performed by: STUDENT IN AN ORGANIZED HEALTH CARE EDUCATION/TRAINING PROGRAM

## 2023-10-14 PROCEDURE — 99239 HOSP IP/OBS DSCHRG MGMT >30: CPT | Performed by: STUDENT IN AN ORGANIZED HEALTH CARE EDUCATION/TRAINING PROGRAM

## 2023-10-14 PROCEDURE — 80048 BASIC METABOLIC PNL TOTAL CA: CPT

## 2023-10-14 PROCEDURE — 700105 HCHG RX REV CODE 258: Performed by: STUDENT IN AN ORGANIZED HEALTH CARE EDUCATION/TRAINING PROGRAM

## 2023-10-14 PROCEDURE — 82962 GLUCOSE BLOOD TEST: CPT

## 2023-10-14 RX ORDER — CEFDINIR 300 MG/1
300 CAPSULE ORAL 2 TIMES DAILY
Qty: 4 CAPSULE | Refills: 0 | Status: ACTIVE | OUTPATIENT
Start: 2023-10-14 | End: 2023-10-14 | Stop reason: SDUPTHER

## 2023-10-14 RX ORDER — GUAIFENESIN 600 MG/1
600 TABLET, EXTENDED RELEASE ORAL EVERY 12 HOURS
Qty: 28 TABLET | Refills: 0 | Status: SHIPPED | OUTPATIENT
Start: 2023-10-14 | End: 2023-10-14 | Stop reason: SDUPTHER

## 2023-10-14 RX ORDER — CEFDINIR 300 MG/1
300 CAPSULE ORAL 2 TIMES DAILY
Qty: 4 CAPSULE | Refills: 0 | Status: ACTIVE | OUTPATIENT
Start: 2023-10-14 | End: 2023-10-16

## 2023-10-14 RX ORDER — GUAIFENESIN 600 MG/1
600 TABLET, EXTENDED RELEASE ORAL EVERY 12 HOURS
Qty: 28 TABLET | Refills: 0 | Status: SHIPPED | OUTPATIENT
Start: 2023-10-14 | End: 2023-10-28

## 2023-10-14 RX ADMIN — FUROSEMIDE 40 MG: 10 INJECTION, SOLUTION INTRAMUSCULAR; INTRAVENOUS at 06:00

## 2023-10-14 RX ADMIN — AMLODIPINE BESYLATE 5 MG: 5 TABLET ORAL at 06:00

## 2023-10-14 RX ADMIN — ASPIRIN 81 MG: 81 TABLET, COATED ORAL at 06:00

## 2023-10-14 RX ADMIN — DIBASIC SODIUM PHOSPHATE, MONOBASIC POTASSIUM PHOSPHATE AND MONOBASIC SODIUM PHOSPHATE 500 MG: 852; 155; 130 TABLET ORAL at 06:00

## 2023-10-14 RX ADMIN — LEVOTHYROXINE SODIUM 25 MCG: 0.03 TABLET ORAL at 06:01

## 2023-10-14 RX ADMIN — AMPICILLIN AND SULBACTAM 3 G: 1; 2 INJECTION, POWDER, FOR SOLUTION INTRAMUSCULAR; INTRAVENOUS at 10:18

## 2023-10-14 RX ADMIN — QUETIAPINE FUMARATE 50 MG: 25 TABLET ORAL at 05:59

## 2023-10-14 RX ADMIN — AMPICILLIN AND SULBACTAM 3 G: 1; 2 INJECTION, POWDER, FOR SOLUTION INTRAMUSCULAR; INTRAVENOUS at 02:57

## 2023-10-14 RX ADMIN — OMEPRAZOLE 20 MG: 20 CAPSULE, DELAYED RELEASE ORAL at 06:01

## 2023-10-14 RX ADMIN — DOCUSATE SODIUM 50 MG AND SENNOSIDES 8.6 MG 2 TABLET: 8.6; 5 TABLET, FILM COATED ORAL at 06:01

## 2023-10-14 RX ADMIN — HEPARIN SODIUM 5000 UNITS: 5000 INJECTION, SOLUTION INTRAVENOUS; SUBCUTANEOUS at 06:00

## 2023-10-14 RX ADMIN — GUAIFENESIN 600 MG: 600 TABLET, EXTENDED RELEASE ORAL at 06:00

## 2023-10-14 ASSESSMENT — PAIN DESCRIPTION - PAIN TYPE: TYPE: ACUTE PAIN

## 2023-10-14 NOTE — DISCHARGE PLANNING
HTH/SCP TCN chart review completed. Collaborated with REYNALDO Martinez, bedside RN, MD, and  DME supplier. Current discharge considerations are now to dc to home with HH services, supplemental 02, 24/7 family support and GSC services if appropriate. Patient seen at bedside with family present earlier in AM and several times throughout the day given somewhat complex dc planning. TCN was requested by pt's family to discuss dc planning at bedside as family was concerned with current dc plan and wanted to discuss possible SNF placement. TCN discussed dc planning considerations with family given current therapy and medical team recs. Discussed concerns with CM as well and TCN and CM reached out to possible accepting SNF facilities. Noted [facility] declined and accepting facilities included [facility] and [facility] at this time (after call placed to [facility] for consideratin), though neither has bed available until Monday at earliest. With further discussion and review of Medicare star rating with accepting facilities, family is now on board with initial plan for dc to home with HH and supplemental 02. Noted update as well that pt will require supplemental 02 at rest as well and TCN and CM reached out to DME provider who is now aware and plans to deliver concentrator to pt's home once home. Pt has portable supplemental 02 for dc home. Pt and family also would like consideration for GSC services as well and TCN will update to GSC referral as well via email.  TCN will continue to follow and collaborate with discharge planning team as additional post acute needs arise. Thank you.    Completed:  PT/OT with recommendations for HH on 10/11  SLP with recs for post acute placement  Choice obtained: [facility] ([facility]); [facility] ([facility]); noted SNF referrals had been sent by REYNALDO; see above  GSC referral sent 10/10/23 ->updated today via email

## 2023-10-14 NOTE — DISCHARGE PLANNING
Agency/Facility name: [facility]  Spoke to: Arina - Answering Service  Outcome: Need to know if the tank delivered to the Pt's room was from [facility], waiting on callback from weekend tech     0825  O2 tank was verified that it was from [facility]     This DPA sent out referrals to [facility] at request of RN CM

## 2023-10-14 NOTE — DISCHARGE SUMMARY
Discharge Summary    CHIEF COMPLAINT ON ADMISSION  Chief Complaint   Patient presents with    Cold Symptoms     Since Thursday, cough, fever, sob, hypoxia on RA.     Shortness of Breath       Reason for Admission  Flu Like Symptoms; Shortness of Br*     Admission Date  10/9/2023    CODE STATUS  DNAR/DNI    HPI & HOSPITAL COURSE  82-year-old female with history of moderate to advanced dementia baseline AO self only, history of hypertension, and hypothyroidism who presented 10/9/23 with shortness of breath. Patient lives with her daughters who are the caregiver.  On admission patient was found to have leukocytosis with elevated lactic acid, chest x-ray showed right-sided pneumonia, CT scan did not show PE however showed signs of pneumonia.  IV antibiotics Unasyn with the fluid were started. Patient was evaluated by speech therapy.   Echo notes Normal left ventricular size and systolic function. Doppler notes No acute thrombosis is identified.   Leukocytosis and procalcitonin have been trending down. Blood culture negative. Urine culture negative. O2 demand has been trending down.     On the day of discharge, patient has no fever, chills, or chest pain. On 2-4L O2. Home oxygen was ordered and delivered prior discharge. PT/OT evaluated the patient and rec C, which has been arranged. She will be discharged with oral antibiotics cefdinir to complete the course. Aspiration precautions discussed with daughters at bedside.   Daughters did have concerns regarding patient going home. Transitional care navigator, PT have reevaluated the patient with family and discussed with them and they eventually agreed patient going home with [facility]. I have advised daughters to keep hydration of patient, avoid aspiration and follow up with PCP in one week.      Of note, the goal of care was discussed with the patient's daughter on admission. She is DNR/DNI.     Therefore, she is discharged in good and stable condition to home with organized  home healthcare and close outpatient follow-up.     The patient met 2-midnight criteria for an inpatient stay at the time of discharge.    Discharge Date  10/14/23    FOLLOW UP ITEMS POST DISCHARGE  PCP    DISCHARGE DIAGNOSES  Principal Problem:    Aspiration pneumonia (HCC) (POA: Unknown)  Active Problems:    HTN (hypertension) (Chronic) (POA: Yes)    Acquired hypothyroidism (Chronic) (POA: Yes)    ACP (advance care planning) (POA: Unknown)    Class 2 obesity in adult (POA: Unknown)    CKD (chronic kidney disease) stage 3, GFR 30-59 ml/min (HCC) (Chronic) (POA: Yes)      Overview: Previous GFR 50s    Dyslipidemia (Chronic) (POA: Yes)    Dementia (HCC) (POA: Unknown)      Overview: On Aricept fb dr Macias            Assessment & Plan       1. 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  The house is still stable and safe for the patient to be       left alone for short interval of time.  She is compliant with her       medicines, they have found an appropriate drug cocktail to control of a       years she has been suffering from.  Hopefully they will not need to be       increased.  She is eating regularly.  Sleep hygiene has been improved.  We       can simply see each other in 6 months.             - QUEtiapine (SEROQUEL) 50 MG tablet; Take 1 Tablet by mouth 2 times a       day.  Dispense: 180 Tablet; Refill: 3      - melatonin 3 MG Tab; Take 1 Tablet by mouth at bedtime.  Dispense: 30       Tablet; Refill: 0    Acute hypoxemic respiratory failure (HCC) (POA: Yes)    Sepsis (HCC) (POA: Unknown)    Hypophosphatemia (POA: Unknown)  Resolved Problems:    * No resolved hospital problems. *      FOLLOW UP  No future appointments.  Meir Thomason M.D.  [address] [place] [id number] [phone]    Follow up in 1  week(s)      Evi Mcdermott P.A.-C.  [address]  [place] [id number] [phone]            MEDICATIONS ON DISCHARGE     Medication List        START taking these medications        Instructions   cefdinir 300 MG Caps  Commonly known as: Omnicef   Take 1 Capsule by mouth 2 times a day for 2 days.  Dose: 300 mg     guaiFENesin  MG Tb12  Commonly known as: Mucinex   Take 1 Tablet by mouth every 12 hours for 14 days.  Dose: 600 mg            CHANGE how you take these medications        Instructions   atorvastatin 20 MG Tabs  What changed: additional instructions  Commonly known as: Lipitor   TAKE 1 TABLET BY MOUTH EVERY DAY AT BEDTIME            CONTINUE taking these medications        Instructions   amLODIPine 5 MG Tabs  Commonly known as: Norvasc   Doctor's comments: 1 refill authorized. Pt to make appt prior to more refills.  Take 1 Tablet by mouth every day.  Dose: 5 mg     aspirin 81 MG tablet   Take 81 mg by mouth every day.  Dose: 81 mg     donepezil 10 MG tablet  Commonly known as: Aricept   Take 1 Tablet by mouth every evening.  Dose: 10 mg     levothyroxine 25 MCG Tabs  Commonly known as: Synthroid   TAKE 1 TABLET BY MOUTH EVERY MORNING ON AN EMPTY STOMACH  Dose: 25 mcg     lisinopril 30 MG tablet  Commonly known as: Prinivil   TAKE 1 TABLET BY MOUTH EVERY DAY  Dose: 30 mg     melatonin 3 MG Tabs   Take 1 Tablet by mouth at bedtime.  Dose: 3 mg     QUEtiapine 50 MG tablet  Commonly known as: SEROquel   Take 1 Tablet by mouth 2 times a day.  Dose: 50 mg     tolterodine ER 4 MG Cp24  Commonly known as: Detrol-LA   Take 4 mg by mouth every day.  Dose: 4 mg              Allergies  No Known Allergies    DIET  Orders Placed This Encounter   Procedures    Diet Order Diet: Regular     Standing Status:   Standing     Number of Occurrences:   1     Order Specific Question:   Diet:     Answer:   Regular [1]       ACTIVITY  As tolerated.  Weight bearing as  tolerated    CONSULTATIONS  na    PROCEDURES  na    LABORATORY  Lab Results   Component Value Date    SODIUM 146 (H) 10/14/2023    POTASSIUM 3.5 (L) 10/14/2023    CHLORIDE 109 10/14/2023    CO2 28 10/14/2023    GLUCOSE 96 10/14/2023    BUN 15 10/14/2023    CREATININE 0.80 10/14/2023        Lab Results   Component Value Date    WBC 7.8 10/12/2023    HEMOGLOBIN 12.6 10/12/2023    HEMATOCRIT 42.2 10/12/2023    PLATELETCT 246 10/12/2023        US-EXTREMITY VENOUS LOWER BILAT   Final Result      EC-ECHOCARDIOGRAM COMPLETE W/O CONT   Final Result      DX-ESOPHAGUS - [id number]   Final Result      CT-CTA CHEST PULMONARY ARTERY W/ RECONS   Final Result         1. No pulmonary embolus.   2. Patchy bilateral infiltrates, consistent with pneumonia. Recommend follow-up to ensure resolution.   3. Mild mediastinal and hilar adenopathy, likely reactive. Consider follow-up chest CT in 3 months to ensure resolution.   4. Small, 2 similar sliding hiatal hernia.   5. Simple liver cysts, which do not require follow-up.         DX-CHEST-PORTABLE (1 VIEW)   Final Result      Ill-defined opacities in the right mid and lower lungs. They may represent activity pneumonia.          Total time of the discharge process 33 minutes.

## 2023-10-14 NOTE — DISCHARGE INSTRUCTIONS
Discharge Instructions per Won Puckett M.D.    Please follow-up with PCP as outpatient.  Keep hydration  Continue antibiotics as prescribed. Common side effect of antibiotics include diarrhea, which will be resolved after completion of antibiotics. If you develop worsening diarrhea, fever, bloody stool, please seek medical attention    Return to ER in the event of new or worsening symptoms. Please note importance of compliance and the patient has agreed to proceed with all medical recommendations and follow up plan indicated above. All medications come with benefits and risks. Risks may include permanent injury or death and these risks can be minimized with close reassessment and monitoring. Please make it to your scheduled follow ups with PCP

## 2023-10-14 NOTE — CARE PLAN
The patient is Stable - Low risk of patient condition declining or worsening    Shift Goals  Clinical Goals: (P) Rest; IV antibiotic administration  Patient Goals: (P) Rest  Family Goals: bobbi    Progress made toward(s) clinical / shift goals:  abx given     Patient is not progressing towards the following goals:

## 2023-10-14 NOTE — DISCHARGE PLANNING
Care Transition Team Final Discharge Disposition    Actual Discharge Information  Discharge Disposition: D/T to home under [facility] care in anticipation of covered skilled care (06)    Pt to discharge home today with HH. [facility] accepted Pt. O2 tank at bedside from [facility] as confirmed by bedside RN Mandy.

## 2023-10-14 NOTE — PROGRESS NOTES
Received report from day shift nurse. Patient is alert and oriented x1. Patient oriented to person only. Patient is calm. She takes all her medication with no complains.     New IV placed via ultrasound by AMAURY bhatti. Antibiotics started. Pharmacy contacted to reschedule next those.

## 2023-10-14 NOTE — CARE PLAN
The patient is Stable - Low risk of patient condition declining or worsening    Shift Goals  Clinical Goals: Patient will remain safe this shift, no agitation  Patient Goals: rest, sleep  Family Goals: bobbi    Progress made toward(s) clinical / shift goals:  Patient remained calm during the shift. No agitation noted. Patient did not attempt to get out of bed this shift. Bed alarm is on. No PRN medication needed for agitation. Patient able to sleep for most of the night.     Patient is not progressing towards the following goals:      Problem: Respiratory  Goal: Patient will achieve/maintain optimum respiratory ventilation and gas exchange  Outcome: Not Progressing     Problem: Mechanical Ventilation  Goal: Successful weaning off mechanical ventilator, spontaneously maintains adequate gas exchange  Outcome: Not Progressing   Patient remains on 3 L of oxygen during the shift. Per notes, O2 referral has being send for home O2.

## 2023-10-15 DIAGNOSIS — R73.03 PREDIABETES: ICD-10-CM

## 2023-10-15 DIAGNOSIS — I10 ESSENTIAL HYPERTENSION: ICD-10-CM

## 2023-10-15 DIAGNOSIS — E78.49 OTHER HYPERLIPIDEMIA: ICD-10-CM

## 2023-10-15 LAB
BACTERIA BLD CULT: NORMAL
SIGNIFICANT IND 70042: NORMAL
SITE SITE: NORMAL
SOURCE SOURCE: NORMAL

## 2023-10-16 RX ORDER — ATORVASTATIN CALCIUM 20 MG/1
TABLET, FILM COATED ORAL
Qty: 100 TABLET | Refills: 0 | Status: SHIPPED | OUTPATIENT
Start: 2023-10-16 | End: 2023-11-10 | Stop reason: SDUPTHER

## 2023-10-17 PROBLEM — N39.0 UTI (URINARY TRACT INFECTION): Status: RESOLVED | Noted: 2022-10-03 | Resolved: 2023-10-17

## 2023-10-17 PROBLEM — A41.9 SEPSIS (HCC): Status: RESOLVED | Noted: 2023-10-10 | Resolved: 2023-10-17

## 2023-10-18 ENCOUNTER — HOME CARE VISIT (OUTPATIENT)
Dept: HOME HEALTH SERVICES | Facility: HOME HEALTHCARE | Age: 83
End: 2023-10-18
Payer: MEDICARE

## 2023-10-18 VITALS
DIASTOLIC BLOOD PRESSURE: 70 MMHG | HEART RATE: 89 BPM | OXYGEN SATURATION: 90 % | RESPIRATION RATE: 18 BRPM | HEIGHT: 64 IN | SYSTOLIC BLOOD PRESSURE: 120 MMHG | TEMPERATURE: 97.4 F | BODY MASS INDEX: 35.34 KG/M2 | WEIGHT: 207 LBS

## 2023-10-18 PROCEDURE — 665001 SOC-HOME HEALTH

## 2023-10-18 PROCEDURE — G0493 RN CARE EA 15 MIN HH/HOSPICE: HCPCS

## 2023-10-18 ASSESSMENT — FIBROSIS 4 INDEX: FIB4 SCORE: 2.07

## 2023-10-19 ENCOUNTER — DOCUMENTATION (OUTPATIENT)
Dept: MEDICAL GROUP | Facility: PHYSICIAN GROUP | Age: 83
End: 2023-10-19

## 2023-10-19 ENCOUNTER — HOME CARE VISIT (OUTPATIENT)
Dept: HOME HEALTH SERVICES | Facility: HOME HEALTHCARE | Age: 83
End: 2023-10-19
Payer: MEDICARE

## 2023-10-19 VITALS
HEART RATE: 83 BPM | SYSTOLIC BLOOD PRESSURE: 122 MMHG | DIASTOLIC BLOOD PRESSURE: 56 MMHG | OXYGEN SATURATION: 94 % | RESPIRATION RATE: 18 BRPM | TEMPERATURE: 97.5 F

## 2023-10-19 PROCEDURE — G0180 MD CERTIFICATION HHA PATIENT: HCPCS | Performed by: INTERNAL MEDICINE

## 2023-10-19 PROCEDURE — G0153 HHCP-SVS OF S/L PATH,EA 15MN: HCPCS

## 2023-10-19 SDOH — ECONOMIC STABILITY: HOUSING INSECURITY: EVIDENCE OF SMOKING MATERIAL: 0

## 2023-10-19 ASSESSMENT — ENCOUNTER SYMPTOMS
PERSON REPORTING PAIN: PATIENT
PAIN LOCATION - PAIN QUALITY: ACHY
BOWEL PATTERN NORMAL: 1
LAST BOWEL MOVEMENT: 66763
PAIN LOCATION - PAIN FREQUENCY: INTERMITTENT
FATIGUES EASILY: 1
DENIES PAIN: 1
PAIN LOCATION - EXACERBATING FACTORS: INCREASED ACTIVITY
PAIN LOCATION - RELIEVING FACTORS: REST
SHORTNESS OF BREATH: 1
DYSPNEA ACTIVITY LEVEL: AFTER AMBULATING 10 - 20 FT
LOWEST PAIN SEVERITY IN PAST 24 HOURS: 0/10
DIFFICULTY THINKING: 1
HIGHEST PAIN SEVERITY IN PAST 24 HOURS: 3/10
CHANGE IN APPETITE: UNCHANGED
APPETITE LEVEL: GOOD
STOOL FREQUENCY: LESS THAN DAILY
PAIN LOCATION - PAIN SEVERITY: 3/10
SUBJECTIVE PAIN PROGRESSION: UNCHANGED
VOMITING: DENIES
PAIN SEVERITY GOAL: 0/10
NAUSEA: DENIES

## 2023-10-19 ASSESSMENT — ACTIVITIES OF DAILY LIVING (ADL): OASIS_M1830: 05

## 2023-10-19 NOTE — PROGRESS NOTES
Medication chart review for [facility] services    Received referral from [facility].   Medications reviewed  compared with discharge summary if available.  Discharge summary date, if applicable:   10/14    Current medication list per [facility]     Medication list one, patient is currently taking    Current Outpatient Medications:     Home Care Oxygen, 2 L/min, Inhalation, PRN    atorvastatin, TAKE 1 TABLET BY MOUTH EVERY DAY AT BEDTIME    guaiFENesin ER, 600 mg, Oral, Q12HRS    QUEtiapine, 50 mg, Oral, BID    melatonin, 3 mg, Oral, QHS    amLODIPine, 5 mg, Oral, QDAY    donepezil, 10 mg, Oral, Q EVENING    levothyroxine, 25 mcg, Oral, AM ES    lisinopril, 30 mg, Oral, QDAY    tolterodine ER, 4 mg, Oral, DAILY      Medication list two, drugs that the patient has been prescribed or recommended to take by their healthcare provider on discharge summary        START taking these medications         Instructions   cefdinir 300 MG Caps  Commonly known as: Omnicef    Take 1 Capsule by mouth 2 times a day for 2 days.  Dose: 300 mg      guaiFENesin  MG Tb12  Commonly known as: Mucinex    Take 1 Tablet by mouth every 12 hours for 14 days.  Dose: 600 mg                CHANGE how you take these medications         Instructions   atorvastatin 20 MG Tabs  What changed: additional instructions  Commonly known as: Lipitor    TAKE 1 TABLET BY MOUTH EVERY DAY AT BEDTIME                CONTINUE taking these medications         Instructions   amLODIPine 5 MG Tabs  Commonly known as: Norvasc    Doctor's comments: 1 refill authorized. Pt to make appt prior to more refills.  Take 1 Tablet by mouth every day.  Dose: 5 mg      aspirin 81 MG tablet    Take 81 mg by mouth every day.  Dose: 81 mg      donepezil 10 MG tablet  Commonly known as: Aricept    Take 1 Tablet by mouth every evening.  Dose: 10 mg      levothyroxine 25 MCG Tabs  Commonly known as: Synthroid    TAKE 1 TABLET BY MOUTH EVERY MORNING ON AN EMPTY  STOMACH  Dose: 25 mcg      lisinopril 30 MG tablet  Commonly known as: Prinivil    TAKE 1 TABLET BY MOUTH EVERY DAY  Dose: 30 mg      melatonin 3 MG Tabs    Take 1 Tablet by mouth at bedtime.  Dose: 3 mg      QUEtiapine 50 MG tablet  Commonly known as: SEROquel    Take 1 Tablet by mouth 2 times a day.  Dose: 50 mg      tolterodine ER 4 MG Cp24  Commonly known as: Detrol-LA    Take 4 mg by mouth every day.  Dose: 4 mg                  No Known Allergies    Labs     Lab Results   Component Value Date/Time    SODIUM 146 (H) 10/14/2023 06:32 AM    POTASSIUM 3.5 (L) 10/14/2023 06:32 AM    CHLORIDE 109 10/14/2023 06:32 AM    CO2 28 10/14/2023 06:32 AM    GLUCOSE 96 10/14/2023 06:32 AM    BUN 15 10/14/2023 06:32 AM    CREATININE 0.80 10/14/2023 06:32 AM     Lab Results   Component Value Date/Time    ALKPHOSPHAT 86 10/10/2023 12:44 AM    ASTSGOT 21 10/10/2023 12:44 AM    ALTSGPT 12 10/10/2023 12:44 AM    TBILIRUBIN 0.4 10/10/2023 12:44 AM    INR 1.30 (H) 10/10/2023 07:11 AM    ALBUMIN 2.8 (L) 10/10/2023 12:44 AM    ALBUMIN 3.33 (L) 01/06/2022 10:49 AM        Assessment for clinically significant drug interactions, drug omissions/additions, duplicative therapies.            CC   Meir Thomason M.D.  [address] [place] [phone]  Fax: [phone]    [facility]  Phone [phone] fax [phone]    This note was created using voice recognition software (Dragon). The accuracy of the dictation is limited by the abilities of the software. I have reviewed the note prior to signing, however some errors in grammar and context are still possible. If you have any questions related to this note please do not hesitate to contact our office.

## 2023-10-20 ENCOUNTER — HOME CARE VISIT (OUTPATIENT)
Dept: HOME HEALTH SERVICES | Facility: HOME HEALTHCARE | Age: 83
End: 2023-10-20
Payer: MEDICARE

## 2023-10-20 NOTE — CASE COMMUNICATION
Just FYI:  Pt's dtr cancelled visit today. Stated it's not a good day and wants to wait until next week to be seen.  Thank you, Heidi Hernandez RN, CM

## 2023-10-23 ENCOUNTER — HOME CARE VISIT (OUTPATIENT)
Dept: HOME HEALTH SERVICES | Facility: HOME HEALTHCARE | Age: 83
End: 2023-10-23
Payer: MEDICARE

## 2023-10-24 ENCOUNTER — HOME CARE VISIT (OUTPATIENT)
Dept: HOME HEALTH SERVICES | Facility: HOME HEALTHCARE | Age: 83
End: 2023-10-24
Payer: MEDICARE

## 2023-10-24 VITALS
RESPIRATION RATE: 16 BRPM | SYSTOLIC BLOOD PRESSURE: 122 MMHG | TEMPERATURE: 97.9 F | DIASTOLIC BLOOD PRESSURE: 62 MMHG | OXYGEN SATURATION: 94 % | HEART RATE: 83 BPM

## 2023-10-24 PROCEDURE — G0153 HHCP-SVS OF S/L PATH,EA 15MN: HCPCS

## 2023-10-24 ASSESSMENT — ENCOUNTER SYMPTOMS
POOR JUDGMENT: 1
DIFFICULTY THINKING: 1

## 2023-10-25 ENCOUNTER — HOME CARE VISIT (OUTPATIENT)
Dept: HOME HEALTH SERVICES | Facility: HOME HEALTHCARE | Age: 83
End: 2023-10-25
Payer: MEDICARE

## 2023-10-25 VITALS
HEART RATE: 84 BPM | DIASTOLIC BLOOD PRESSURE: 68 MMHG | SYSTOLIC BLOOD PRESSURE: 120 MMHG | OXYGEN SATURATION: 93 % | RESPIRATION RATE: 17 BRPM | TEMPERATURE: 97.7 F

## 2023-10-25 PROCEDURE — G0495 RN CARE TRAIN/EDU IN HH: HCPCS

## 2023-10-25 SDOH — ECONOMIC STABILITY: HOUSING INSECURITY: HOME SAFETY: DAUGHTER INSTRUCTED TO POST NO SMOKING SIGN

## 2023-10-25 SDOH — ECONOMIC STABILITY: HOUSING INSECURITY: EVIDENCE OF SMOKING MATERIAL: 0

## 2023-10-25 ASSESSMENT — ENCOUNTER SYMPTOMS
FATIGUES EASILY: 1
DYSPNEA ON EXERTION: 1
LIMITED RANGE OF MOTION: 1
COUGH: 1
COUGH CHARACTERISTICS: NON-PRODUCTIVE
BOWEL PATTERN NORMAL: 1
DIFFICULTY THINKING: 1
SEVERE DYSPNEA: 1
POOR JUDGMENT: 1
LAST BOWEL MOVEMENT: 66771
DEPRESSED MOOD: 1
MUSCLE WEAKNESS: 1
DENIES PAIN: 1
STOOL FREQUENCY: LESS THAN DAILY
PERSON REPORTING PAIN: PATIENT

## 2023-10-25 NOTE — CASE COMMUNICATION
Quality Review Completed for 10/18 SOC OASIS by MAT Aguayo RN on 10/25/2023:     Edits completed by MAT Aguayo RN:     1.  and  diagnosis coding updated per chart review  2.  is NA,  is 10/14 valid referral date  3.  #6 for difficulty complying with walker/oxygen per EMR documentation  4.  changed to 3, per BIMS/CAM   5. Checked dyspnea w/min exertion to 485 functional limitations.   6. Checked aspiration p recautions to 485 Safety Measures.

## 2023-10-26 NOTE — CASE COMMUNICATION
Noted and agree with all changes. Thank you.     Brandy Parish RN    ----- Message -----  From: Marycruz Aguayo R.N.  Sent: 10/25/2023  10:22 AM PDT  To: Josefina Parish R.N.      Quality Review Completed for 10/18 SOC OASIS by AMT Aguayo RN on 10/25/2023:     Edits completed by MAT Aguayo RN:     1.  and  diagnosis coding updated per chart review  2.  is NA,  is 10/14 valid referral date  3.  #6 for diffi culty complying with walker/oxygen per EMR documentation  4.  changed to 3, per BIMS/CAM   5. Checked dyspnea w/min exertion to 485 functional limitations.   6. Checked aspiration precautions to 485 Safety Measures.

## 2023-10-27 ENCOUNTER — HOME CARE VISIT (OUTPATIENT)
Dept: HOME HEALTH SERVICES | Facility: HOME HEALTHCARE | Age: 83
End: 2023-10-27
Payer: MEDICARE

## 2023-10-31 ENCOUNTER — HOME CARE VISIT (OUTPATIENT)
Dept: HOME HEALTH SERVICES | Facility: HOME HEALTHCARE | Age: 83
End: 2023-10-31
Payer: MEDICARE

## 2023-10-31 ENCOUNTER — APPOINTMENT (OUTPATIENT)
Dept: MEDICAL GROUP | Facility: PHYSICIAN GROUP | Age: 83
End: 2023-10-31
Payer: MEDICARE

## 2023-11-02 ENCOUNTER — HOME CARE VISIT (OUTPATIENT)
Dept: HOME HEALTH SERVICES | Facility: HOME HEALTHCARE | Age: 83
End: 2023-11-02
Payer: MEDICARE

## 2023-11-02 ENCOUNTER — HOSPITAL ENCOUNTER (OUTPATIENT)
Facility: MEDICAL CENTER | Age: 83
End: 2023-11-02
Attending: PHYSICIAN ASSISTANT
Payer: MEDICARE

## 2023-11-02 LAB
ALBUMIN SERPL BCP-MCNC: 3.5 G/DL (ref 3.2–4.9)
ALBUMIN/GLOB SERPL: 1 G/DL
ALP SERPL-CCNC: 86 U/L (ref 30–99)
ALT SERPL-CCNC: 15 U/L (ref 2–50)
ANION GAP SERPL CALC-SCNC: 11 MMOL/L (ref 7–16)
APPEARANCE UR: ABNORMAL
AST SERPL-CCNC: 18 U/L (ref 12–45)
BACTERIA #/AREA URNS HPF: NEGATIVE /HPF
BILIRUB SERPL-MCNC: 0.4 MG/DL (ref 0.1–1.5)
BILIRUB UR QL STRIP.AUTO: NEGATIVE
BUN SERPL-MCNC: 14 MG/DL (ref 8–22)
CALCIUM ALBUM COR SERPL-MCNC: 10.3 MG/DL (ref 8.5–10.5)
CALCIUM SERPL-MCNC: 9.9 MG/DL (ref 8.5–10.5)
CHLORIDE SERPL-SCNC: 107 MMOL/L (ref 96–112)
CO2 SERPL-SCNC: 22 MMOL/L (ref 20–33)
COLOR UR: YELLOW
CREAT SERPL-MCNC: 0.97 MG/DL (ref 0.5–1.4)
EPI CELLS #/AREA URNS HPF: ABNORMAL /HPF
GFR SERPLBLD CREATININE-BSD FMLA CKD-EPI: 57 ML/MIN/1.73 M 2
GLOBULIN SER CALC-MCNC: 3.5 G/DL (ref 1.9–3.5)
GLUCOSE SERPL-MCNC: 131 MG/DL (ref 65–99)
GLUCOSE UR STRIP.AUTO-MCNC: NEGATIVE MG/DL
HYALINE CASTS #/AREA URNS LPF: ABNORMAL /LPF
KETONES UR STRIP.AUTO-MCNC: NEGATIVE MG/DL
LEUKOCYTE ESTERASE UR QL STRIP.AUTO: NEGATIVE
MICRO URNS: ABNORMAL
NITRITE UR QL STRIP.AUTO: NEGATIVE
PH UR STRIP.AUTO: 7.5 [PH] (ref 5–8)
POTASSIUM SERPL-SCNC: 4.2 MMOL/L (ref 3.6–5.5)
PROT SERPL-MCNC: 7 G/DL (ref 6–8.2)
PROT UR QL STRIP: NEGATIVE MG/DL
RBC # URNS HPF: ABNORMAL /HPF
RBC UR QL AUTO: NEGATIVE
SODIUM SERPL-SCNC: 140 MMOL/L (ref 135–145)
SP GR UR STRIP.AUTO: 1.02
TSH SERPL DL<=0.005 MIU/L-ACNC: 3.01 UIU/ML (ref 0.38–5.33)
UROBILINOGEN UR STRIP.AUTO-MCNC: 0.2 MG/DL
WBC #/AREA URNS HPF: ABNORMAL /HPF

## 2023-11-02 PROCEDURE — 80053 COMPREHEN METABOLIC PANEL: CPT

## 2023-11-02 PROCEDURE — G0299 HHS/HOSPICE OF RN EA 15 MIN: HCPCS

## 2023-11-02 PROCEDURE — 84443 ASSAY THYROID STIM HORMONE: CPT

## 2023-11-02 PROCEDURE — 81001 URINALYSIS AUTO W/SCOPE: CPT

## 2023-11-02 ASSESSMENT — FIBROSIS 4 INDEX: FIB4 SCORE: 1.59

## 2023-11-03 VITALS
SYSTOLIC BLOOD PRESSURE: 90 MMHG | TEMPERATURE: 97.4 F | OXYGEN SATURATION: 92 % | DIASTOLIC BLOOD PRESSURE: 50 MMHG | RESPIRATION RATE: 16 BRPM | HEART RATE: 84 BPM | WEIGHT: 200.2 LBS | BODY MASS INDEX: 34.36 KG/M2

## 2023-11-03 ASSESSMENT — ENCOUNTER SYMPTOMS
SUBJECTIVE PAIN PROGRESSION: UNCHANGED
BOWEL PATTERN NORMAL: 1
PAIN SEVERITY GOAL: 0/10
HIGHEST PAIN SEVERITY IN PAST 24 HOURS: 0/10
LOWEST PAIN SEVERITY IN PAST 24 HOURS: 0/10
LAST BOWEL MOVEMENT: 66780
VOMITING: DENIES
NAUSEA: DENIES
MUSCLE WEAKNESS: 1
DENIES PAIN: 1
DIFFICULTY THINKING: 1
PERSON REPORTING PAIN: PATIENT
STOOL FREQUENCY: DAILY
LIMITED RANGE OF MOTION: 1

## 2023-11-03 ASSESSMENT — PAIN SCALES - PAIN ASSESSMENT IN ADVANCED DEMENTIA (PAINAD)
FACIALEXPRESSION: 0 - SMILING OR INEXPRESSIVE.
CONSOLABILITY: 0 - NO NEED TO CONSOLE.
NEGVOCALIZATION: 0 - NONE.
TOTALSCORE: 0
BODYLANGUAGE: 0 - RELAXED.

## 2023-11-03 ASSESSMENT — ACTIVITIES OF DAILY LIVING (ADL)
AMBULATION ASSISTANCE: STAND BY ASSIST
CURRENT_FUNCTION: STAND BY ASSIST

## 2023-11-03 NOTE — CASE COMMUNICATION
Just FYI:  Pt's weight down 7 pounds since SOC on 10/18/23. Poor appetite. Family states she does complain of dizziness. Drinking very little fluids. BP 90/50. Blood work and urine obtained and send to [facility]. Thank you, Heidi Hernandez RN, CM

## 2023-11-08 ENCOUNTER — HOSPITAL ENCOUNTER (OUTPATIENT)
Facility: MEDICAL CENTER | Age: 83
End: 2023-11-08
Attending: PHYSICIAN ASSISTANT
Payer: MEDICARE

## 2023-11-08 ENCOUNTER — HOME CARE VISIT (OUTPATIENT)
Dept: HOME HEALTH SERVICES | Facility: HOME HEALTHCARE | Age: 83
End: 2023-11-08
Payer: MEDICARE

## 2023-11-08 LAB
BASOPHILS # BLD AUTO: 0.4 % (ref 0–1.8)
BASOPHILS # BLD: 0.03 K/UL (ref 0–0.12)
EOSINOPHIL # BLD AUTO: 0.04 K/UL (ref 0–0.51)
EOSINOPHIL NFR BLD: 0.5 % (ref 0–6.9)
ERYTHROCYTE [DISTWIDTH] IN BLOOD BY AUTOMATED COUNT: 55.3 FL (ref 35.9–50)
HCT VFR BLD AUTO: 43.6 % (ref 37–47)
HGB BLD-MCNC: 13.7 G/DL (ref 12–16)
IMM GRANULOCYTES # BLD AUTO: 0.01 K/UL (ref 0–0.11)
IMM GRANULOCYTES NFR BLD AUTO: 0.1 % (ref 0–0.9)
LYMPHOCYTES # BLD AUTO: 1.76 K/UL (ref 1–4.8)
LYMPHOCYTES NFR BLD: 22.4 % (ref 22–41)
MCH RBC QN AUTO: 28.7 PG (ref 27–33)
MCHC RBC AUTO-ENTMCNC: 31.4 G/DL (ref 32.2–35.5)
MCV RBC AUTO: 91.4 FL (ref 81.4–97.8)
MONOCYTES # BLD AUTO: 0.54 K/UL (ref 0–0.85)
MONOCYTES NFR BLD AUTO: 6.9 % (ref 0–13.4)
NEUTROPHILS # BLD AUTO: 5.48 K/UL (ref 1.82–7.42)
NEUTROPHILS NFR BLD: 69.7 % (ref 44–72)
NRBC # BLD AUTO: 0 K/UL
NRBC BLD-RTO: 0 /100 WBC (ref 0–0.2)
PLATELET # BLD AUTO: 203 K/UL (ref 164–446)
PMV BLD AUTO: 10.1 FL (ref 9–12.9)
PROLACTIN SERPL-MCNC: 10.8 NG/ML (ref 2.8–26)
RBC # BLD AUTO: 4.77 M/UL (ref 4.2–5.4)
WBC # BLD AUTO: 7.9 K/UL (ref 4.8–10.8)

## 2023-11-08 PROCEDURE — 85025 COMPLETE CBC W/AUTO DIFF WBC: CPT

## 2023-11-08 PROCEDURE — G0299 HHS/HOSPICE OF RN EA 15 MIN: HCPCS

## 2023-11-08 PROCEDURE — 84146 ASSAY OF PROLACTIN: CPT

## 2023-11-09 VITALS
RESPIRATION RATE: 16 BRPM | SYSTOLIC BLOOD PRESSURE: 100 MMHG | DIASTOLIC BLOOD PRESSURE: 50 MMHG | HEART RATE: 74 BPM | OXYGEN SATURATION: 91 % | TEMPERATURE: 98.3 F

## 2023-11-09 ASSESSMENT — ENCOUNTER SYMPTOMS
DYSPNEA ACTIVITY LEVEL: AFTER AMBULATING 10 - 20 FT
BOWEL PATTERN NORMAL: 1
DENIES PAIN: 1
SHORTNESS OF BREATH: 1
POOR JUDGMENT: 1
PERSON REPORTING PAIN: PATIENT
MUSCLE WEAKNESS: 1

## 2023-11-10 PROBLEM — R73.02 IMPAIRED GLUCOSE TOLERANCE: Status: ACTIVE | Noted: 2023-11-10

## 2023-11-10 PROBLEM — Q61.3 POLYCYSTIC KIDNEY DISEASE: Status: ACTIVE | Noted: 2023-11-10

## 2023-11-10 PROBLEM — F99 INSOMNIA DUE TO OTHER MENTAL DISORDER: Status: ACTIVE | Noted: 2023-11-10

## 2023-11-10 PROBLEM — J44.9 COPD (CHRONIC OBSTRUCTIVE PULMONARY DISEASE) (HCC): Status: ACTIVE | Noted: 2023-11-10

## 2023-11-10 PROBLEM — K44.9 HIATAL HERNIA: Status: ACTIVE | Noted: 2023-11-10

## 2023-11-10 PROBLEM — N39.42 URINARY INCONTINENCE WITHOUT SENSORY AWARENESS: Status: ACTIVE | Noted: 2023-11-10

## 2023-11-10 PROBLEM — F51.05 INSOMNIA DUE TO OTHER MENTAL DISORDER: Status: ACTIVE | Noted: 2023-11-10

## 2023-11-14 ENCOUNTER — HOME CARE VISIT (OUTPATIENT)
Dept: HOME HEALTH SERVICES | Facility: HOME HEALTHCARE | Age: 83
End: 2023-11-14
Payer: MEDICARE

## 2023-11-14 PROCEDURE — G0299 HHS/HOSPICE OF RN EA 15 MIN: HCPCS

## 2023-11-15 ENCOUNTER — HOME CARE VISIT (OUTPATIENT)
Dept: HOME HEALTH SERVICES | Facility: HOME HEALTHCARE | Age: 83
End: 2023-11-15
Payer: MEDICARE

## 2023-11-15 VITALS
DIASTOLIC BLOOD PRESSURE: 66 MMHG | SYSTOLIC BLOOD PRESSURE: 104 MMHG | HEART RATE: 61 BPM | RESPIRATION RATE: 18 BRPM | TEMPERATURE: 96.3 F | OXYGEN SATURATION: 96 %

## 2023-11-15 SDOH — ECONOMIC STABILITY: FOOD INSECURITY: MEALS PER DAY: 3

## 2023-11-15 SDOH — ECONOMIC STABILITY: HOUSING INSECURITY: EVIDENCE OF SMOKING MATERIAL: 1

## 2023-11-15 ASSESSMENT — ENCOUNTER SYMPTOMS
NAUSEA: DENIES
LOWEST PAIN SEVERITY IN PAST 24 HOURS: 0/10
VOMITING: DENIES
SKIN LESIONS: 1
CHANGE IN APPETITE: UNCHANGED
MUSCLE WEAKNESS: 1
LIMITED RANGE OF MOTION: 1
SUBJECTIVE PAIN PROGRESSION: UNCHANGED
STOOL FREQUENCY: DAILY
APPETITE LEVEL: GOOD
BOWEL PATTERN NORMAL: 1
HIGHEST PAIN SEVERITY IN PAST 24 HOURS: 0/10
PERSON REPORTING PAIN: PATIENT
LAST BOWEL MOVEMENT: 66792
DENIES PAIN: 1
PAIN SEVERITY GOAL: 0/10

## 2023-11-15 ASSESSMENT — ACTIVITIES OF DAILY LIVING (ADL)
HOME_HEALTH_OASIS: 01
AMBULATION ASSISTANCE: STAND BY ASSIST
OASIS_M1830: 03
CURRENT_FUNCTION: STAND BY ASSIST

## 2023-11-15 NOTE — CASE COMMUNICATION
Quality Review for 11.14.23 [place] OASIS performed on by CLAUDY Zapien RN on 11.15.2023:    Edits completed by CLAUDY Zapien RN:  1. Removed B from A224 as we are not part of a health information exchange organization  2. Changed  A to yes, pt is on seroquel  3. Changed  to 3 per GF1987 I response of 4  4. Changed  to D, pt was on a low na diet per the POC  5. Changed M870 to 0 per [id number] A response of 6

## 2023-11-16 NOTE — CASE COMMUNICATION
I agree with these changes.  Thank you, Heidi Hernandez RN,   ----- Message -----  From: Corry Zapien R.N.  Sent: 11/15/2023   9:42 AM PST  To: Heidi Hernandez R.N.      Quality Review for 11.14.23 [place] OASIS performed on by CLAUDY Zapien RN on 11.15.2023:    Edits completed by CLAUDY Zapien RN:  1. Removed B from A224 as we are not part of a health information exchange organization  2. Changed  A to yes, pt is on seroquel  3. Changed  t o 3 per DV5616 I response of 4  4. Changed  to D, pt was on a low na diet per the POC  5. Changed M870 to 0 per [id number] A response of 6

## 2024-01-01 ENCOUNTER — HOME HEALTH ADMISSION (OUTPATIENT)
Dept: HOME HEALTH SERVICES | Facility: HOME HEALTHCARE | Age: 84
End: 2024-01-01
Payer: MEDICARE

## 2024-01-01 ENCOUNTER — HOME CARE VISIT (OUTPATIENT)
Dept: HOSPICE | Facility: HOSPICE | Age: 84
End: 2024-01-01
Payer: MEDICARE

## 2024-01-01 ENCOUNTER — PHARMACY VISIT (OUTPATIENT)
Dept: PHARMACY | Facility: MEDICAL CENTER | Age: 84
End: 2024-01-01
Payer: COMMERCIAL

## 2024-01-01 ENCOUNTER — HOSPICE ADMISSION (OUTPATIENT)
Dept: HOSPICE | Facility: HOSPICE | Age: 84
End: 2024-01-01
Payer: MEDICARE

## 2024-01-01 ENCOUNTER — HOME CARE VISIT (OUTPATIENT)
Dept: HOME HEALTH SERVICES | Facility: HOME HEALTHCARE | Age: 84
End: 2024-01-01

## 2024-01-01 ENCOUNTER — HOSPITAL ENCOUNTER (OUTPATIENT)
Facility: MEDICAL CENTER | Age: 84
End: 2024-02-28
Payer: MEDICARE

## 2024-01-01 ENCOUNTER — OFFICE VISIT (OUTPATIENT)
Dept: NEUROLOGY | Facility: MEDICAL CENTER | Age: 84
End: 2024-01-01
Attending: PSYCHIATRY & NEUROLOGY
Payer: MEDICARE

## 2024-01-01 VITALS
OXYGEN SATURATION: 93 % | BODY MASS INDEX: 33.91 KG/M2 | HEART RATE: 83 BPM | TEMPERATURE: 96.5 F | SYSTOLIC BLOOD PRESSURE: 110 MMHG | DIASTOLIC BLOOD PRESSURE: 60 MMHG | WEIGHT: 197.53 LBS

## 2024-01-01 VITALS — RESPIRATION RATE: 14 BRPM

## 2024-01-01 VITALS — RESPIRATION RATE: 12 BRPM | HEART RATE: 80 BPM | DIASTOLIC BLOOD PRESSURE: 60 MMHG | SYSTOLIC BLOOD PRESSURE: 80 MMHG

## 2024-01-01 VITALS — RESPIRATION RATE: 16 BRPM | SYSTOLIC BLOOD PRESSURE: 110 MMHG | HEART RATE: 88 BPM | DIASTOLIC BLOOD PRESSURE: 64 MMHG

## 2024-01-01 VITALS — HEART RATE: 96 BPM | RESPIRATION RATE: 20 BRPM | DIASTOLIC BLOOD PRESSURE: 72 MMHG | SYSTOLIC BLOOD PRESSURE: 102 MMHG

## 2024-01-01 VITALS — DIASTOLIC BLOOD PRESSURE: 60 MMHG | HEART RATE: 76 BPM | SYSTOLIC BLOOD PRESSURE: 100 MMHG | RESPIRATION RATE: 24 BRPM

## 2024-01-01 DIAGNOSIS — F02.80 ALZHEIMER'S DEMENTIA, UNSPECIFIED DEMENTIA SEVERITY, UNSPECIFIED TIMING OF DEMENTIA ONSET, UNSPECIFIED WHETHER BEHAVIORAL, PSYCHOTIC, OR MOOD DISTURBANCE OR ANXIETY (HCC): ICD-10-CM

## 2024-01-01 DIAGNOSIS — G30.1 LATE ONSET ALZHEIMER'S DEMENTIA WITH BEHAVIORAL DISTURBANCE (HCC): Primary | ICD-10-CM

## 2024-01-01 DIAGNOSIS — G30.1 ALZHEIMER'S TYPE DEMENTIA WITH LATE ONSET WITH BEHAVIORAL DISTURBANCE (HCC): ICD-10-CM

## 2024-01-01 DIAGNOSIS — F02.818 LATE ONSET ALZHEIMER'S DEMENTIA WITH BEHAVIORAL DISTURBANCE (HCC): Primary | ICD-10-CM

## 2024-01-01 DIAGNOSIS — R53.83 LETHARGY: ICD-10-CM

## 2024-01-01 DIAGNOSIS — F02.818 ALZHEIMER'S TYPE DEMENTIA WITH LATE ONSET WITH BEHAVIORAL DISTURBANCE (HCC): ICD-10-CM

## 2024-01-01 DIAGNOSIS — G30.9 ALZHEIMER'S DEMENTIA, UNSPECIFIED DEMENTIA SEVERITY, UNSPECIFIED TIMING OF DEMENTIA ONSET, UNSPECIFIED WHETHER BEHAVIORAL, PSYCHOTIC, OR MOOD DISTURBANCE OR ANXIETY (HCC): ICD-10-CM

## 2024-01-01 DIAGNOSIS — E03.9 ACQUIRED HYPOTHYROIDISM: ICD-10-CM

## 2024-01-01 LAB
ALBUMIN SERPL BCP-MCNC: 3.5 G/DL (ref 3.2–4.9)
ALBUMIN/GLOB SERPL: 1.2 G/DL
ALP SERPL-CCNC: 85 U/L (ref 30–99)
ALT SERPL-CCNC: 6 U/L (ref 2–50)
ANION GAP SERPL CALC-SCNC: 13 MMOL/L (ref 7–16)
AST SERPL-CCNC: 13 U/L (ref 12–45)
BASOPHILS # BLD AUTO: 1 % (ref 0–1.8)
BASOPHILS # BLD: 0.05 K/UL (ref 0–0.12)
BILIRUB SERPL-MCNC: 0.4 MG/DL (ref 0.1–1.5)
BUN SERPL-MCNC: 21 MG/DL (ref 8–22)
CALCIUM ALBUM COR SERPL-MCNC: 10.7 MG/DL (ref 8.5–10.5)
CALCIUM SERPL-MCNC: 10.3 MG/DL (ref 8.5–10.5)
CHLORIDE SERPL-SCNC: 107 MMOL/L (ref 96–112)
CO2 SERPL-SCNC: 21 MMOL/L (ref 20–33)
CREAT SERPL-MCNC: 1.02 MG/DL (ref 0.5–1.4)
EOSINOPHIL # BLD AUTO: 0.06 K/UL (ref 0–0.51)
EOSINOPHIL NFR BLD: 1.2 % (ref 0–6.9)
ERYTHROCYTE [DISTWIDTH] IN BLOOD BY AUTOMATED COUNT: 50.5 FL (ref 35.9–50)
GFR SERPLBLD CREATININE-BSD FMLA CKD-EPI: 54 ML/MIN/1.73 M 2
GLOBULIN SER CALC-MCNC: 3 G/DL (ref 1.9–3.5)
GLUCOSE SERPL-MCNC: 128 MG/DL (ref 65–99)
HCT VFR BLD AUTO: 45.3 % (ref 37–47)
HGB BLD-MCNC: 14.6 G/DL (ref 12–16)
IMM GRANULOCYTES # BLD AUTO: 0.01 K/UL (ref 0–0.11)
IMM GRANULOCYTES NFR BLD AUTO: 0.2 % (ref 0–0.9)
LYMPHOCYTES # BLD AUTO: 1.43 K/UL (ref 1–4.8)
LYMPHOCYTES NFR BLD: 28.1 % (ref 22–41)
MCH RBC QN AUTO: 29.4 PG (ref 27–33)
MCHC RBC AUTO-ENTMCNC: 32.2 G/DL (ref 32.2–35.5)
MCV RBC AUTO: 91.3 FL (ref 81.4–97.8)
MONOCYTES # BLD AUTO: 0.26 K/UL (ref 0–0.85)
MONOCYTES NFR BLD AUTO: 5.1 % (ref 0–13.4)
NEUTROPHILS # BLD AUTO: 3.28 K/UL (ref 1.82–7.42)
NEUTROPHILS NFR BLD: 64.4 % (ref 44–72)
NRBC # BLD AUTO: 0 K/UL
NRBC BLD-RTO: 0 /100 WBC (ref 0–0.2)
PLATELET # BLD AUTO: 240 K/UL (ref 164–446)
PMV BLD AUTO: 10.2 FL (ref 9–12.9)
POTASSIUM SERPL-SCNC: 4.4 MMOL/L (ref 3.6–5.5)
PROT SERPL-MCNC: 6.5 G/DL (ref 6–8.2)
RBC # BLD AUTO: 4.96 M/UL (ref 4.2–5.4)
SODIUM SERPL-SCNC: 141 MMOL/L (ref 135–145)
TSH SERPL DL<=0.005 MIU/L-ACNC: 2.77 UIU/ML (ref 0.38–5.33)
WBC # BLD AUTO: 5.1 K/UL (ref 4.8–10.8)

## 2024-01-01 PROCEDURE — S9126 HOSPICE CARE, IN THE HOME, P: HCPCS

## 2024-01-01 PROCEDURE — 80053 COMPREHEN METABOLIC PANEL: CPT

## 2024-01-01 PROCEDURE — 3078F DIAST BP <80 MM HG: CPT | Performed by: PSYCHIATRY & NEUROLOGY

## 2024-01-01 PROCEDURE — G0156 HHCP-SVS OF AIDE,EA 15 MIN: HCPCS

## 2024-01-01 PROCEDURE — G0299 HHS/HOSPICE OF RN EA 15 MIN: HCPCS

## 2024-01-01 PROCEDURE — 85025 COMPLETE CBC W/AUTO DIFF WBC: CPT

## 2024-01-01 PROCEDURE — 99212 OFFICE O/P EST SF 10 MIN: CPT | Performed by: PSYCHIATRY & NEUROLOGY

## 2024-01-01 PROCEDURE — RXMED WILLOW AMBULATORY MEDICATION CHARGE: Performed by: REHABILITATION PRACTITIONER

## 2024-01-01 PROCEDURE — 665036 HSPC NOTICE OF ELECTION NOE

## 2024-01-01 PROCEDURE — 99215 OFFICE O/P EST HI 40 MIN: CPT | Performed by: PSYCHIATRY & NEUROLOGY

## 2024-01-01 PROCEDURE — 84443 ASSAY THYROID STIM HORMONE: CPT

## 2024-01-01 PROCEDURE — 3074F SYST BP LT 130 MM HG: CPT | Performed by: PSYCHIATRY & NEUROLOGY

## 2024-01-01 RX ORDER — BISACODYL 10 MG
10 SUPPOSITORY, RECTAL RECTAL PRN
Qty: 5 SUPPOSITORY | Refills: 10 | Status: SHIPPED | OUTPATIENT
Start: 2024-01-01 | End: 2025-03-01

## 2024-01-01 RX ORDER — ONDANSETRON 4 MG/1
4 TABLET, ORALLY DISINTEGRATING ORAL EVERY 6 HOURS PRN
Qty: 15 TABLET | Refills: 10 | Status: SHIPPED | OUTPATIENT
Start: 2024-01-01 | End: 2025-03-01

## 2024-01-01 RX ORDER — MORPHINE SULFATE 100 MG/5ML
5-10 SOLUTION ORAL
Qty: 240 ML | Refills: 0 | Status: SHIPPED | OUTPATIENT
Start: 2024-01-01 | End: 2025-03-01

## 2024-01-01 RX ORDER — QUETIAPINE FUMARATE 50 MG/1
50 TABLET, FILM COATED ORAL 2 TIMES DAILY
Qty: 30 TABLET | Refills: 23 | Status: SHIPPED | OUTPATIENT
Start: 2024-01-01 | End: 2025-03-01

## 2024-01-01 RX ORDER — LORAZEPAM 2 MG/ML
1-2 CONCENTRATE ORAL
Qty: 360 ML | Refills: 0 | Status: SHIPPED | OUTPATIENT
Start: 2024-01-01 | End: 2025-03-01

## 2024-01-01 RX ORDER — ACETAMINOPHEN 650 MG/1
650 SUPPOSITORY RECTAL EVERY 6 HOURS PRN
Qty: 5 SUPPOSITORY | Refills: 10 | Status: SHIPPED | OUTPATIENT
Start: 2024-01-01 | End: 2025-03-01

## 2024-01-01 RX ORDER — LEVOTHYROXINE SODIUM 0.03 MG/1
25 TABLET ORAL
Qty: 30 TABLET | Refills: 11 | Status: SHIPPED | OUTPATIENT
Start: 2024-01-01 | End: 2025-03-01

## 2024-01-01 RX ORDER — ACETAMINOPHEN 500 MG
1000 TABLET ORAL EVERY 6 HOURS PRN
Qty: 30 TABLET | Refills: 10 | Status: SHIPPED | OUTPATIENT
Start: 2024-01-01 | End: 2025-03-01

## 2024-01-01 RX ORDER — SENNA AND DOCUSATE SODIUM 50; 8.6 MG/1; MG/1
2 TABLET, FILM COATED ORAL 2 TIMES DAILY PRN
Qty: 28 TABLET | Refills: 10 | Status: SHIPPED | OUTPATIENT
Start: 2024-01-01 | End: 2025-03-01

## 2024-01-01 SDOH — ECONOMIC STABILITY: GENERAL

## 2024-01-01 ASSESSMENT — ENCOUNTER SYMPTOMS
PERSON REPORTING PAIN: DIRECT OBSERVATION
FATIGUE: 1
PAIN LOCATION - PAIN FREQUENCY: INTERMITTENT
DESCRIPTION OF MEMORY LOSS: IMMEDIATE
PERSON REPORTING PAIN: FAMILY
DENIES PAIN: 1
STOOL FREQUENCY: LESS THAN DAILY
FATIGUE: 1
STOOL FREQUENCY: LESS THAN DAILY
STOOL FREQUENCY: LESS THAN DAILY
DRY SKIN: 1
PERSON REPORTING PAIN: FAMILY
SHORTNESS OF BREATH: 1
PAIN: 1
LIMITED RANGE OF MOTION: 1
LAST BOWEL MOVEMENT: 66902
LIMITED RANGE OF MOTION: 1
FATIGUES EASILY: 1
SLEEP QUALITY: ADEQUATE
BOWEL INCONTINENCE: 1
MUSCLE WEAKNESS: 1
STOOL FREQUENCY: LESS THAN DAILY
PAIN LOCATION - RELIEVING FACTORS: REPOSITIONING, MEDICATIONS
FATIGUES EASILY: 1
PAIN LOCATION - PAIN FREQUENCY: INTERMITTENT
DRY SKIN: 1
DESCRIPTION OF MEMORY LOSS: LONG TERM
PERSON REPORTING PAIN: PATIENT
DESCRIPTION OF MEMORY LOSS: SHORT TERM
LAST BOWEL MOVEMENT: 66900
FORGETFULNESS: 1
MUSCLE WEAKNESS: 1
LAST BOWEL MOVEMENT: 66899
PAIN SEVERITY GOAL: 0/10
PAIN LOCATION - PAIN DURATION: CHRONIC
HIGHEST PAIN SEVERITY IN PAST 24 HOURS: 2/10
MUSCLE WEAKNESS: 1
DYSPNEA ACTIVITY LEVEL: AFTER AMBULATING LESS THAN 10 FT
DESCRIPTION OF MEMORY LOSS: LONG TERM
PAIN LOCATION - PAIN QUALITY: ACHE
FATIGUES EASILY: 1
DRY SKIN: 1
FORGETFULNESS: 1
LAST BOWEL MOVEMENT: 66908
FATIGUES EASILY: 1
DESCRIPTION OF MEMORY LOSS: SHORT TERM
PAIN LOCATION: RIGHT WRIST
PAIN LOCATION: BACK
BOWEL INCONTINENCE: 1
SLEEP QUALITY: ADEQUATE
HIGHEST PAIN SEVERITY IN PAST 24 HOURS: 3/10
DYSPNEA ON EXERTION: 1
LIMITED RANGE OF MOTION: 1
SUBJECTIVE PAIN PROGRESSION: WAXING AND WANING
PAIN LOCATION - PAIN SEVERITY: 3/10
PAIN LOCATION - PAIN SEVERITY: 2/10
LIMITED RANGE OF MOTION: 1
LAST BOWEL MOVEMENT: 66913
HYPOTENSION: 1
DRY SKIN: 1
PAIN LOCATION: BACK
MUSCLE WEAKNESS: 1
LOWEST PAIN SEVERITY IN PAST 24 HOURS: 0/10
LOWEST PAIN SEVERITY IN PAST 24 HOURS: 2/10
SLEEP QUALITY: ADEQUATE
DENIES PAIN: 1

## 2024-01-01 ASSESSMENT — SOCIAL DETERMINANTS OF HEALTH (SDOH)
ACTIVE STRESSOR - NO STRESS FACTORS: 1
ACTIVE STRESSOR - NO STRESS FACTORS: 1

## 2024-01-01 ASSESSMENT — PATIENT HEALTH QUESTIONNAIRE - PHQ9: CLINICAL INTERPRETATION OF PHQ2 SCORE: 0

## 2024-01-01 ASSESSMENT — ACTIVITIES OF DAILY LIVING (ADL)
CURRENT_FUNCTION: ONE PERSON
AMBULATION ASSISTANCE: STAND BY ASSIST
PHYSICAL_TRANSFER_REQUIRES_ASSISTANCE: 1
PHYSICAL_TRANSFER_REQUIRES_ASSISTANCE: 1
MONEY MANAGEMENT (EXPENSES/BILLS): TOTALLY DEPENDENT
DRESSING_REQUIRES_ASSISTANCE: 1
MONEY MANAGEMENT (EXPENSES/BILLS): TOTALLY DEPENDENT
AMBULATION_REQUIRES_ASSISTANCE: 1
AMBULATION_REQUIRES_ASSISTANCE: 1
MONEY MANAGEMENT (EXPENSES/BILLS): TOTALLY DEPENDENT
CONTINENCE_REQUIRES_ASSISTANCE: 1
AMBULATION ASSISTANCE: STAND BY ASSIST
CURRENT_FUNCTION: STAND BY ASSIST
BATHING_REQUIRES_ASSISTANCE: 1
BATHING_REQUIRES_ASSISTANCE: 1
CONTINENCE_REQUIRES_ASSISTANCE: 1
AMBULATION ASSISTANCE: ONE PERSON
DRESSING_REQUIRES_ASSISTANCE: 1
AMBULATION ASSISTANCE: STAND BY ASSIST

## 2024-01-01 ASSESSMENT — PAIN SCALES - PAIN ASSESSMENT IN ADVANCED DEMENTIA (PAINAD)
CONSOLABILITY: 0 - NO NEED TO CONSOLE.
BODYLANGUAGE: 0 - RELAXED.
CONSOLABILITY: 0 - NO NEED TO CONSOLE.
NEGVOCALIZATION: 0 - NONE.
NEGVOCALIZATION: 0 - NONE.
FACIALEXPRESSION: 0 - SMILING OR INEXPRESSIVE.
TOTALSCORE: 0
FACIALEXPRESSION: 0 - SMILING OR INEXPRESSIVE.
TOTALSCORE: 0
CONSOLABILITY: 0 - NO NEED TO CONSOLE.
BODYLANGUAGE: 0 - RELAXED.
BODYLANGUAGE: 0 - RELAXED.
FACIALEXPRESSION: 0 - SMILING OR INEXPRESSIVE.
TOTALSCORE: 0
NEGVOCALIZATION: 0 - NONE.

## 2024-01-01 ASSESSMENT — FIBROSIS 4 INDEX: FIB4 SCORE: 1.95

## 2024-01-04 PROBLEM — R64 CACHEXIA (HCC): Status: ACTIVE | Noted: 2023-12-29

## 2024-01-05 NOTE — PROGRESS NOTES
Subjective     Katharine Gee is a 83 y.o. female who presents with her daughter Yolande, for 6-month follow-up, with a history of moderate Alzheimer's dementia.  The history is gotten from review of records and discussion with the patient's daughter.  The patient herself is incapable of giving anything cogent.     HPI    Katharine has been doing well at home, she lives with her daughter Yolande and her  Calderon, along with her other daughter Gerri.  She has some difficulty deciding who is how she lives in. Yolande states Katharine can confuse both her daughters.  She is still getting along well with everybody.  There have been no issues with agitation, delirium or hallucinations.    Short-term memory is still essentially gone.  Even with reminders she cannot remember moment to moment.  Fortunately, none of this really seems to bother her.  She is becoming quieter, engaging in conversations routinely is no longer the case.  Even then she has difficulty keeping pace.  There is now more hesitancy and difficulty with words when trying to express herself.  She does not seem to be frustrated with this.  Though she eats, she is not eating as much.  They supplement with protein shakes every once in a while.  With showering it is becoming more of a paulino, they are karan if they get her in twice a week, the rest is basically a quick rinse and wash off.  She does participate in some of the ADLs, even getting dressed she is for the most part independent.    She does sleep the night through, there is no drowsiness or sleeping in the afternoons.  Melatonin has controlled the wandering and pacing that was happening throughout the night.    Bowel and bladder control are maintained though there was an interval when there was a loss of bladder control, she would feel the urge but did not leave enough time to get to the bathroom.  This seems to have spontaneously remitted.  Balance is more more curtailed.  They would  like her to use the cane and walker that she has, she simply refuses to do so when they are offered to her.  Fortunately, she is not falling.  She does wall-walk and sofa-surf.    Medications are taken easily without a fight.  She is on Aricept 10 mg every evening, melatonin 3 mg every evening and Seroquel 50 mg, twice daily.    Medical, surgical and family histories are reviewed, there are no new drug allergies.  She was admitted to the hospital on October 9 through 14, 2023, for shortness of breath and agitation.  During that time in the hospital there was significant sundowning.  That resolved, the hypoxia and shortness of breath as well.  This occurred to the brief interlude with loss of bladder control.    She is on Synthroid, Norvasc, Prinivil, Lipitor, Detrol LA, she has oxygen at home as needed, and the Aricept, melatonin and Seroquel as above.    Review of Systems   Unable to perform ROS: Dementia     Objective     /60 (BP Location: Right arm, Patient Position: Sitting, BP Cuff Size: Large adult)   Pulse 83   Temp 35.8 °C (96.5 °F) (Temporal)   Wt 89.6 kg (197 lb 8.5 oz)   SpO2 93%   BMI 33.91 kg/m²      Physical Exam    She appears in no acute distress.  She is clean and appropriately dressed.  She is cooperative.  Vital signs are stable.  There is no malar rash.  Her neck is supple.  Cardiac evaluation is unremarkable.     Neurological Exam    She is quiet, she interacts now only if directly addressed.  Responses are simple without real complexity, she is perseverative, she forgets moment to moment, even with cueing, she has no clue of the date.  She has a vague recollection she has seen me before, certainly no idea why.  She does remember her date of birth though it takes her some time.  She has no idea of her age.  She does not know her address.  She again is confused as to with whom she lives and whether all the family is together.  She tends to look at her daughter initially when she is  questioned.  She can name and repeat, there is some hand-object substitution with motor skill assessment, though there is no apraxia.  Frontal release signs are present.    PERRLA/EOMI, visual fields are grossly full to observation.  There is no facial asymmetry, sensory exam is intact to temperature bilaterally.  There is no bulbar dysfunction.  Shoulder shrug and head rotation are symmetric.    Tone is normal, there is no tremor or asterixis.  Strength is grossly intact throughout.    Reflexes are diminished, ankle jerks absent bilaterally.  The toes are downgoing.    She is quite unsteady as she walks, there is an apractic quality, she walks flat-footed.  Her station is widened.  She holds onto the examiner's arm, reaching for objects in the room to maintain balance; as a result, armswing cannot be assessed. Repetitive movements in all 4 extremities are intact, there is no appendicular dystaxia exhibited as she takes her shoes off and on for the exam.    Sensory exam shows a stocking pattern decameter vibration in the distal lower extremities, temperature seems to be intact.    Assessment & Plan     1. 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  Why she had a brief interlude where urinary control was slightly compromised probably will be known.  Things seem to return to baseline spontaneously.  The family is doing a very good job of maintaining hygiene and safety for the patient.  They are supplementing her diet is much as possible.  As for the squabbles over showering, they were told to simply redirect if she puts up too much a fuss; hopefully they can redirect later that same day to get her into the shower.  They are at least doing a basic wash using a basin and washcloth with soap on a more regular basis.  They will continue the sleep aids and Seroquel to help with behavior.  They know to keep stimulation  to a limit to avoid agitation.  They continue to try to engage her as much as possible.  We will see each other in 1 year.  We will communicate via [employer] in the interim if there are any acute changes.       Time: 40 minutes in total spent on patient care including pre-charting, record review, discussion with healthcare staff and documentation.  This includes face-to-face time for exam, review, discussion, as well as counseling and coordinating care.

## 2024-02-08 PROBLEM — R63.0 ANOREXIA: Status: ACTIVE | Noted: 2024-02-08

## 2024-02-08 PROBLEM — E86.0 DEHYDRATION: Status: ACTIVE | Noted: 2024-02-08

## 2024-02-08 PROBLEM — R53.83 LETHARGY: Status: ACTIVE | Noted: 2024-02-08

## 2024-02-14 PROBLEM — R53.81 PHYSICAL DEBILITY: Status: ACTIVE | Noted: 2024-02-14

## 2024-02-14 PROBLEM — N30.00 ACUTE CYSTITIS WITHOUT HEMATURIA: Status: ACTIVE | Noted: 2024-02-14

## 2024-03-01 NOTE — PROGRESS NOTES
Patient discussed with Price Zheng RN. [facility] admitting to community [facility] with primary diagnosis of Alzheimers Dementia with AFTT. Comfort medications ordered for delivery today.

## 2024-03-28 ASSESSMENT — ENCOUNTER SYMPTOMS: PERSON REPORTING PAIN: DIRECT OBSERVATION

## 2024-03-28 ASSESSMENT — PAIN SCALES - PAIN ASSESSMENT IN ADVANCED DEMENTIA (PAINAD)
TOTALSCORE: 0
CONSOLABILITY: 0 - NO NEED TO CONSOLE.
FACIALEXPRESSION: 0 - SMILING OR INEXPRESSIVE.
BODYLANGUAGE: 0 - RELAXED.
NEGVOCALIZATION: 0 - NONE.

## 2025-04-09 NOTE — PROGRESS NOTES
Chief Complaint   Patient presents with   • Annual Exam       HPI:  Katharine Gee is a 81 y.o. here for Medicare Annual Wellness Visit     Patient Active Problem List    Diagnosis Date Noted   • Dementia without behavioral disturbance (HCC) 08/26/2021   • Carotid artery stenosis 10/19/2020   • Severe obesity (HCC) 01/08/2020   • Abnormal CT of the abdomen 05/25/2021   • Abdominal pain 05/25/2021   • Hepatic cyst 05/05/2021   • Memory deficit 03/22/2021   • Dizziness 10/12/2020   • Right ankle pain 10/12/2020   • CKD (chronic kidney disease) stage 3, GFR 30-59 ml/min ([facility]) 02/05/2020   • Prediabetes 02/05/2020   • Dyslipidemia 02/05/2020   • Hernia of abdominal wall 10/10/2018   • Overactive bladder 09/20/2018   • Acquired hypothyroidism 07/18/2017   • Vitamin D deficiency 07/18/2017   • HTN (hypertension)        Current Outpatient Medications   Medication Sig Dispense Refill   • amLODIPine (NORVASC) 5 MG Tab Take 1 Tablet by mouth every day. 90 Tablet 3   • atorvastatin (LIPITOR) 20 MG Tab TAKE 1 TABLET BY MOUTH EVERY DAY AT BEDTIME 90 Tablet 3   • donepezil (ARICEPT) 10 MG tablet Take 1 Tablet by mouth every evening. 90 Tablet 3   • ergocalciferol (DRISDOL) 72321 UNIT capsule Take 1 Capsule by mouth every 7 days. 15 Capsule 3   • levothyroxine (SYNTHROID) 25 MCG Tab Take 1 Tablet by mouth every morning on an empty stomach. 90 Tablet 3   • lisinopril (PRINIVIL) 30 MG tablet Take 1 Tablet by mouth every day. 90 Tablet 3   • tolterodine ER (DETROL LA) 4 MG CAPSULE SR 24 HR Take 4 mg by mouth every day.     • aspirin 81 MG tablet Take 81 mg by mouth every day.       No current facility-administered medications for this visit.          Current supplements as per medication list.     Allergies: Patient has no known allergies.    Current social contact/activities:      She  reports that she has never smoked. She has never used smokeless tobacco. She reports current alcohol use. She reports that she does not  use drugs.  Counseling given: Not Answered      DPA/Advanced Directive:  Patient has Advanced Directive on file.     ROS:    Gait: Uses a cane  Ostomy: No  Other tubes: No  Amputations: No  Chronic oxygen use: No  Last eye exam: 2018  Wears hearing aids: No   : Denies any urinary leakage during the last 6 months    Screening:    Depression Screening  Little interest or pleasure in doing things?  0 - not at all  Feeling down, depressed , or hopeless? 0 - not at all  Patient Health Questionnaire Score: 0     If depressive symptoms identified deferred to follow up visit unless specifically addressed in assessment and plan.    Interpretation of PHQ-9 Total Score   Score Severity   1-4 No Depression   5-9 Mild Depression   10-14 Moderate Depression   15-19 Moderately Severe Depression   20-27 Severe Depression    Screening for Cognitive Impairment  Three Minute Recall (daughter, heaven, mountain) 1/3    Burak clock face with all 12 numbers and set the hands to show 10 past 11.  Yes    Cognitive concerns identified deferred for follow up unless specifically addressed in assessment and plan.    Fall Risk Assessment  Has the patient had two or more falls in the last year or any fall with injury in the last year?  No    Safety Assessment  Throw rugs on floor.  Yes  Handrails on all stairs.  Yes  Good lighting in all hallways.  Yes  Difficulty hearing.  No  Patient counseled about all safety risks that were identified.    Functional Assessment ADLs  Are there any barriers preventing you from cooking for yourself or meeting nutritional needs?  No.    Are there any barriers preventing you from driving safely or obtaining transportation?  Yes.    Are there any barriers preventing you from using a telephone or calling for help?  No.    Are there any barriers preventing you from shopping?  No.    Are there any barriers preventing you from taking care of your own finances?  No.    Are there any barriers preventing you from  managing your medications?  No.    Are there any barriers preventing you from showering, bathing or dressing yourself?  No.    Are you currently engaging in any exercise or physical activity?  No.     What is your perception of your health?  Good.      Health Maintenance Summary          Postponed - IMM ZOSTER VACCINES (1 of 2) Postponed until 11/7/2022    No completion history exists for this topic.          Postponed - COVID-19 Vaccine (1) Postponed until 6/7/2023    No completion history exists for this topic.          IMM INFLUENZA (Season Ended) Next due on 9/1/2022    10/05/2014  Imm Admin: Influenza Vaccine Adult HD          Annual Wellness Visit (Every 366 Days) Next due on 6/8/2023 06/07/2022  Level of Service: ANNUAL WELLNESS VISIT-INCLUDES PPPS SUBSEQUE*    03/22/2021  Done    03/22/2021  Visit Dx: Medicare annual wellness visit, subsequent    04/05/2018  Done          BONE DENSITY (Every 5 Years) Next due on 4/1/2026 04/01/2021  DS-BONE DENSITY STUDY (DEXA)    12/18/2014  DS-BONE DENSITY STUDY (DEXA)          IMM DTaP/Tdap/Td Vaccine (2 - Td or Tdap) Next due on 10/11/2027    10/11/2017  Imm Admin: Tdap Vaccine          IMM PNEUMOCOCCAL VACCINE: 65+ Years (Series Information) Completed    07/18/2017  Imm Admin: Pneumococcal Conjugate Vaccine (Prevnar/PCV-13)    07/04/2014  Imm Admin: Pneumococcal polysaccharide vaccine (PPSV-23)          IMM HEP B VACCINE (Series Information) Aged Out    No completion history exists for this topic.          IMM MENINGOCOCCAL VACCINE (MCV4) (Series Information) Aged Out    No completion history exists for this topic.          Discontinued - MAMMOGRAM  Discontinued    12/18/2014  MA-SCREENING MAMMOGRAM W/ CAD    11/30/2006  [id number]          Discontinued - PAP SMEAR  Discontinued    No completion history exists for this topic.          Discontinued - COLORECTAL CANCER SCREENING  Discontinued    01/02/2006  COLONOSCOPY (Done)                Patient Care  "Team:  Meir Thomason M.D. as PCP - General (Internal Medicine)  Evi Mcdermott P.A.-C. as PCP - [facility] Paneled  Claudio Barcenas O.D. as Consulting Physician (Optometry)  Danita Tolbert M.D. (Surgery)  Chirag Cooley M.D. as Consulting Physician (Urology)  Fady Mckeon Ass't (Inactive) as    Chuck Golden M.D. as Consulting Physician (Sports Medicine)        Social History     Tobacco Use   • Smoking status: Never Smoker   • Smokeless tobacco: Never Used   Vaping Use   • Vaping Use: Never used   Substance Use Topics   • Alcohol use: Yes     Comment: very seldomly drinks   • Drug use: No     Family History   Problem Relation Age of Onset   • Heart Disease Mother    • Heart Attack Father    • Diabetes Brother    • Dementia Brother    • Diabetes Maternal Grandmother      She  has a past medical history of Acquired hypothyroidism (7/18/2017), Arthritis, Carotid artery stenosis (10/19/2020), HTN (hypertension), Hypertension, Kidney stone, Memory loss (10/4/2017), Menopause (2/2/2015), Migraine, Obesity (BMI 30-39.9) (4/5/2018), and Polymyalgia rheumatica (HCC).    She has no past medical history of Anxiety, Arrhythmia, Asthma, Blood transfusion without reported diagnosis, Cancer (HCC), CHF (congestive heart failure) (HCC), Clotting disorder (HCC), COPD (chronic obstructive pulmonary disease) (HCC), Depression, Diabetes (HCC), GERD (gastroesophageal reflux disease), Heart attack (HCC), IBD (inflammatory bowel disease), Seizure ([facility]), or Urinary tract infection.   Past Surgical History:   Procedure Laterality Date   • EXPLORATORY LAPAROTOMY  7/3/2014    Performed by Danita Tolbert M.D. at SURGERY [facility]   • BOWEL RESECTION  7/3/2014    Performed by Danita Tolbert M.D. at SURGERY [facility]   • NH REVISE SECONDARY VARICOSITY      \"varicose veins operated on \"    • TONSILLECTOMY     • VAGINAL HYSTERECTOMY TOTAL         Exam:   /64 (BP Location: Left arm, Patient " "Position: Sitting, BP Cuff Size: Adult)   Pulse 69   Temp 36.2 °C (97.2 °F) (Temporal)   Resp 16   Ht 1.626 m (5' 4\")   Wt 86.6 kg (191 lb)   SpO2 96%  Body mass index is 32.79 kg/m².    Hearing good.    Dentition good  Alert, oriented in no acute distress.  Eye contact is good, speech goal directed, affect calm    Assessment and Plan. The following treatment and monitoring plan is recommended:     Dementia without behavioral disturbance (HCC)  This is a chronic condition.  The patient presently taking Aricept daily.  No significant side effects reported.  Stable condition according to patient's daughter who is here accompanied the patient    Acquired hypothyroidism  Ongoing condition.  The patient is taking levothyroxine.    CKD (chronic kidney disease) stage 3, GFR 30-59 ml/min (HCC)  Chronic condition.  Previous GFR in the 50s.  Lab tests ordered for follow-up.  Advised the patient to avoid NSAIDs    Dyslipidemia  Patient presently taking atorvastatin.  Lipid panel ordered for follow-up.    HTN (hypertension)  Chronic condition.  The patient is on amlodipine and lisinopril.  No significant side effects reported.  Blood pressure stable: The patient's report    Overactive bladder  Patient followed by urology service.  She is currently taking Detrol.    Prediabetes  Patient on diet therapy.  Lab tests ordered for follow-up.    Severe obesity (HCC)  Body mass index is 32.79 kg/m².   Brief discussion with the patient regarding diet, exercise, and lifestyle modification to help achieve and maintain healthy weight                Carotid artery stenosis  Previous ultrasound completed 2020.  I have ordered a repeat carotid ultrasound to be done for follow-up.  Patient is presently asymptomatic.          Health Care Screening: Age-appropriate preventive services recommended by USPTF and ACIP covered by Medicare were discussed today. Services ordered if indicated and agreed upon by the patient.  Referrals offered: " Community-based lifestyle interventions to reduce health risks and promote self-management and wellness, fall prevention, nutrition, physical activity, tobacco-use cessation, weight loss, and mental health services as per orders if indicated.    Discussion today about general wellness and lifestyle habits:    · Prevent falls and reduce trip hazards; Cautioned about securing or removing rugs.  · Have a working fire alarm and carbon monoxide detector;   · Engage in regular physical activity and social activities     Follow-up: Return in about 4 months (around 10/7/2022).   Patient answered NO to all of the above 4 questions.